# Patient Record
Sex: MALE | Race: ASIAN | NOT HISPANIC OR LATINO | URBAN - METROPOLITAN AREA
[De-identification: names, ages, dates, MRNs, and addresses within clinical notes are randomized per-mention and may not be internally consistent; named-entity substitution may affect disease eponyms.]

---

## 2019-04-10 ENCOUNTER — OUTPATIENT (OUTPATIENT)
Dept: OUTPATIENT SERVICES | Facility: HOSPITAL | Age: 74
LOS: 1 days | Discharge: HOME | End: 2019-04-10
Payer: MEDICAID

## 2019-04-10 PROBLEM — Z00.00 ENCOUNTER FOR PREVENTIVE HEALTH EXAMINATION: Status: ACTIVE | Noted: 2019-04-10

## 2019-04-12 ENCOUNTER — APPOINTMENT (OUTPATIENT)
Dept: RADIATION ONCOLOGY | Facility: CLINIC | Age: 74
End: 2019-04-12
Payer: MEDICAID

## 2019-04-12 VITALS
HEART RATE: 56 BPM | SYSTOLIC BLOOD PRESSURE: 145 MMHG | DIASTOLIC BLOOD PRESSURE: 71 MMHG | HEIGHT: 63 IN | BODY MASS INDEX: 30.02 KG/M2 | TEMPERATURE: 98.1 F | WEIGHT: 169.42 LBS | OXYGEN SATURATION: 98 % | RESPIRATION RATE: 16 BRPM

## 2019-04-12 DIAGNOSIS — I10 ESSENTIAL (PRIMARY) HYPERTENSION: ICD-10-CM

## 2019-04-12 DIAGNOSIS — E78.5 HYPERLIPIDEMIA, UNSPECIFIED: ICD-10-CM

## 2019-04-12 DIAGNOSIS — Z80.1 FAMILY HISTORY OF MALIGNANT NEOPLASM OF TRACHEA, BRONCHUS AND LUNG: ICD-10-CM

## 2019-04-12 DIAGNOSIS — E11.9 TYPE 2 DIABETES MELLITUS W/OUT COMPLICATIONS: ICD-10-CM

## 2019-04-12 DIAGNOSIS — B37.0 CANDIDAL STOMATITIS: ICD-10-CM

## 2019-04-12 PROCEDURE — 99204 OFFICE O/P NEW MOD 45 MIN: CPT | Mod: GC,25

## 2019-04-12 RX ORDER — RANITIDINE 75 MG/1
TABLET ORAL
Refills: 0 | Status: ACTIVE | COMMUNITY

## 2019-04-12 RX ORDER — FOLIC ACID 20 MG
CAPSULE ORAL
Refills: 0 | Status: ACTIVE | COMMUNITY

## 2019-04-12 RX ORDER — ZAFIRLUKAST 10 MG/1
TABLET, COATED ORAL
Refills: 0 | Status: ACTIVE | COMMUNITY

## 2019-04-12 RX ORDER — ALBUTEROL 90 MCG
AEROSOL (GRAM) INHALATION
Refills: 0 | Status: ACTIVE | COMMUNITY

## 2019-04-12 RX ORDER — FLUTICASONE PROPIONATE 0.5 MG/G
CREAM TOPICAL
Refills: 0 | Status: ACTIVE | COMMUNITY

## 2019-04-12 RX ORDER — LOSARTAN POTASSIUM 100 MG/1
TABLET, FILM COATED ORAL
Refills: 0 | Status: ACTIVE | COMMUNITY

## 2019-04-12 RX ORDER — SIMVASTATIN 80 MG/1
TABLET, FILM COATED ORAL
Refills: 0 | Status: ACTIVE | COMMUNITY

## 2019-04-12 RX ORDER — METHOTREXATE 2.5 MG/1
TABLET ORAL
Refills: 0 | Status: ACTIVE | COMMUNITY

## 2019-04-12 RX ORDER — TIOTROPIUM BROMIDE INHALATION SPRAY 1.56 UG/1
SPRAY, METERED RESPIRATORY (INHALATION)
Refills: 0 | Status: ACTIVE | COMMUNITY

## 2019-04-12 RX ORDER — BUDESONIDE AND FORMOTEROL FUMARATE DIHYDRATE 160; 4.5 UG/1; UG/1
AEROSOL RESPIRATORY (INHALATION)
Refills: 0 | Status: ACTIVE | COMMUNITY

## 2019-04-12 RX ORDER — METFORMIN HYDROCHLORIDE 625 MG/1
TABLET ORAL
Refills: 0 | Status: ACTIVE | COMMUNITY

## 2019-04-12 RX ORDER — HYDROXYCHLOROQUINE SULFATE 400 MG/1
TABLET ORAL
Refills: 0 | Status: ACTIVE | COMMUNITY

## 2019-04-12 RX ORDER — OLOPATADINE HYDROCHLORIDE 7 MG/ML
SOLUTION OPHTHALMIC
Refills: 0 | Status: ACTIVE | COMMUNITY

## 2019-04-12 RX ORDER — TAMSULOSIN HCL 0.4 MG
CAPSULE ORAL
Refills: 0 | Status: ACTIVE | COMMUNITY

## 2019-04-12 RX ORDER — NYSTATIN 100000 [USP'U]/ML
100000 SUSPENSION ORAL
Qty: 140 | Refills: 0 | Status: ACTIVE | COMMUNITY
Start: 2019-04-12 | End: 1900-01-01

## 2019-04-12 NOTE — REASON FOR VISIT
[Consideration for Non-Curative Therapy] : consideration for non-curative therapy for [Lung Cancer] : lung cancer [Family Member] : family member [Brain Metastasis] : brain metastasis

## 2019-04-12 NOTE — VITALS
[Maximal Pain Intensity: 5/10] : 5/10 [Least Pain Intensity: 2/10] : 2/10 [Pain Description/Quality: ___] : Pain description/quality: [unfilled] [Pain Duration: ___] : Pain duration: [unfilled] [Pain Location: ___] : Pain Location: [unfilled] [Pain Interferes with ADLs] : Pain interferes with activities of daily living. [Adjuvant (neuroleptics)] : adjuvant (neuroleptics) [70: Cares for self; unalbe to carry on normal activity or do active work.] : 70: Cares for self; unable to carry on normal activity or do active work. [ECOG Performance Status: 3 - Capable of only limited self care, confined to bed or chair more than 50% of waking hours] : Performance Status: 3 - Capable of only limited self care, confined to bed or chair more than 50% of waking hours

## 2019-04-14 NOTE — REVIEW OF SYSTEMS
[SOB on Exertion] : shortness of breath during exertion [Urinary Frequency] : urinary frequency [Joint Pain] : joint pain [Negative] : Allergic/Immunologic [FreeTextEntry6] : on meds for COPD [FreeTextEntry8] : on meds for BPH [FreeTextEntry9] : on meds for B/L knee pain [de-identified] : left side weakness

## 2019-04-14 NOTE — END OF VISIT
[] : Resident [Time Spent: ___ minutes] : I have spent [unfilled] minutes of face to face time with the patient [>50% of Time Spent on Counseling and Coordination of Care for  ___] : Greater than 50% of the encounter time was spent on counseling and coordination of care for [unfilled]

## 2019-04-14 NOTE — DISEASE MANAGEMENT
[Pathological] : TNM Stage: p [IVB] : IVB [FreeTextEntry4] : lung adenocarcinoma [MTNM] : 1c [TTNM] : 2 [NTNM] : 3

## 2019-04-14 NOTE — PHYSICAL EXAM
[Abdomen Tenderness] : non-tender [Normal] : normal spine exam without palpable tenderness, no kyphosis or scoliosis [de-identified] : distended [de-identified] : thrush, oral mucositis. poor dentition [de-identified] : bilateral knee pain [de-identified] : CN II-XII intact. LUE strength 4+/5, LLE strength 4/5. right upper and lower extremity intact. Sensation intact

## 2019-04-14 NOTE — HISTORY OF PRESENT ILLNESS
[FreeTextEntry1] : Mr. 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 Notable history includes DM on Metformin, Asthma, RA on methotrexate.\par \par He was brought to [facility] on 3/18/19 after he developed left upper and lower extremity weakness and difficulty walking for a few days. He quit smoking 5 months ago (15 pack year history). No witnessed seizures or LOC reported. Two weeks prior to admission he fell but did not hit his head or had LOC. He has a cough but denies SOB, fever, or chills. CT Head showed multiple regions of vasogenic edema. CT chest showed 3.9 cm right upper lobe mass with right hilar and mediastinal, and right supraclavicular lymphadenopathy. CT A/P was unremarkable.\par \par MRI brain showed diffuse metastatic cerebral lesions the largest in the right frontal parasagittal region measuring 1.4 x 1.6 cm with surrounding vasogenic edema. Additional small nodular enhancing lesions seen in the left medial temporal lobe, left posterior sylvian fissure, left frontal and left parietal cortex roughly 8-9 mm in size with mild edema. There is a 1.3 cm nodular enhancing lesion along the anterior aspect of the right glen.  No midline shift.He was evaluated by NSGY who recommended no surgical intervention given diffuse mets. He was discharged on Decadron 4mg BID taper and Keppra.\par \par The right supraclavicular node was biopsied on 3/25 at [facility] and showed lung adenocarcinoma, micropapillary type,PDL1 5%. Per patient there are additional testing are pending at [place].\par \par Patient does not have a medical oncologist and would like to get all of his care at [facility].\par \par His Decadron was tapered off yesterday. Per family he was on 2mg qD yesterday before stopping. He reports moderate improvement in left sided weakness since he started steroids. He can walk with a cane but reports mild dizziness. Continues to have a non productive cough but denies SOB or ARAUJO. Reports pain in his mouth and pain with swallowing.  He denies seizures, headache, vision changes, N/V. new weakness or numbness, or hemoptysis.  He has bilateral knee pain due to RA.

## 2019-04-15 ENCOUNTER — FORM ENCOUNTER (OUTPATIENT)
Age: 74
End: 2019-04-15

## 2019-04-16 ENCOUNTER — APPOINTMENT (OUTPATIENT)
Dept: ULTRASOUND IMAGING | Facility: IMAGING CENTER | Age: 74
End: 2019-04-16
Payer: MEDICAID

## 2019-04-16 ENCOUNTER — OUTPATIENT (OUTPATIENT)
Dept: OUTPATIENT SERVICES | Facility: HOSPITAL | Age: 74
LOS: 1 days | End: 2019-04-16
Payer: MEDICAID

## 2019-04-16 ENCOUNTER — APPOINTMENT (OUTPATIENT)
Dept: RADIATION ONCOLOGY | Facility: CLINIC | Age: 74
End: 2019-04-16
Payer: MEDICAID

## 2019-04-16 ENCOUNTER — OUTPATIENT (OUTPATIENT)
Dept: OUTPATIENT SERVICES | Facility: HOSPITAL | Age: 74
LOS: 1 days | Discharge: HOME | End: 2019-04-16

## 2019-04-16 VITALS
WEIGHT: 171.3 LBS | SYSTOLIC BLOOD PRESSURE: 144 MMHG | RESPIRATION RATE: 16 BRPM | OXYGEN SATURATION: 96 % | HEART RATE: 65 BPM | DIASTOLIC BLOOD PRESSURE: 81 MMHG | BODY MASS INDEX: 30.34 KG/M2

## 2019-04-16 DIAGNOSIS — Z80.6 FAMILY HISTORY OF LEUKEMIA: ICD-10-CM

## 2019-04-16 DIAGNOSIS — C34.90 MALIGNANT NEOPLASM OF UNSPECIFIED PART OF UNSPECIFIED BRONCHUS OR LUNG: ICD-10-CM

## 2019-04-16 DIAGNOSIS — Z87.891 PERSONAL HISTORY OF NICOTINE DEPENDENCE: ICD-10-CM

## 2019-04-16 PROCEDURE — 93970 EXTREMITY STUDY: CPT | Mod: 26

## 2019-04-16 PROCEDURE — 99215 OFFICE O/P EST HI 40 MIN: CPT | Mod: 25

## 2019-04-16 PROCEDURE — 93970 EXTREMITY STUDY: CPT

## 2019-04-17 ENCOUNTER — CHART COPY (OUTPATIENT)
Age: 74
End: 2019-04-17

## 2019-04-18 ENCOUNTER — APPOINTMENT (OUTPATIENT)
Dept: RADIATION ONCOLOGY | Facility: CLINIC | Age: 74
End: 2019-04-18
Payer: MEDICAID

## 2019-04-18 ENCOUNTER — OUTPATIENT (OUTPATIENT)
Dept: OUTPATIENT SERVICES | Facility: HOSPITAL | Age: 74
LOS: 1 days | Discharge: HOME | End: 2019-04-18
Payer: MEDICAID

## 2019-04-23 ENCOUNTER — FORM ENCOUNTER (OUTPATIENT)
Age: 74
End: 2019-04-23

## 2019-04-23 ENCOUNTER — APPOINTMENT (OUTPATIENT)
Age: 74
End: 2019-04-23
Payer: MEDICAID

## 2019-04-23 VITALS
RESPIRATION RATE: 28 BRPM | SYSTOLIC BLOOD PRESSURE: 136 MMHG | OXYGEN SATURATION: 98 % | HEART RATE: 59 BPM | WEIGHT: 165.35 LBS | DIASTOLIC BLOOD PRESSURE: 71 MMHG | TEMPERATURE: 98.2 F | BODY MASS INDEX: 29.29 KG/M2

## 2019-04-23 PROCEDURE — 99205 OFFICE O/P NEW HI 60 MIN: CPT

## 2019-04-24 ENCOUNTER — APPOINTMENT (OUTPATIENT)
Dept: NEUROSURGERY | Facility: CLINIC | Age: 74
End: 2019-04-24
Payer: MEDICAID

## 2019-04-24 ENCOUNTER — APPOINTMENT (OUTPATIENT)
Dept: MRI IMAGING | Facility: IMAGING CENTER | Age: 74
End: 2019-04-24

## 2019-04-24 ENCOUNTER — OUTPATIENT (OUTPATIENT)
Dept: OUTPATIENT SERVICES | Facility: HOSPITAL | Age: 74
LOS: 1 days | End: 2019-04-24
Payer: MEDICAID

## 2019-04-24 DIAGNOSIS — C79.31 SECONDARY MALIGNANT NEOPLASM OF BRAIN: ICD-10-CM

## 2019-04-24 PROCEDURE — 77334 RADIATION TREATMENT AID(S): CPT | Mod: 26

## 2019-04-24 PROCEDURE — 77290 THER RAD SIMULAJ FIELD CPLX: CPT | Mod: 26

## 2019-04-24 PROCEDURE — 99202 OFFICE O/P NEW SF 15 MIN: CPT

## 2019-04-24 PROCEDURE — 76498 UNLISTED MR PROCEDURE: CPT

## 2019-04-24 NOTE — REASON FOR VISIT
[Follow-Up: _____] : a [unfilled] follow-up visit [Family Member] : family member [FreeTextEntry1] : MD Rebolledo is a 73 year old male with past medical history of DM, asthma, HLD, rheumatoid arthritis (on methotrexate). He presented initially to to [facility] on 3/18/19 after having LUE and LLE weakness and trouble walking for a few days. Notably fell two weeks prior to admission.\par \par His imaging MRI last 4/24/19 showed several enhancing lesions consistent with diffuse cerebral metastatic lesions, the largest in the right posterior frontal/parietal parasagittal area with a moderate degree of surrounding vasogenic edema demonstrating central necrosis and peripheral enhancement, 1.4 X 1.6 cm. There was a 1.3 cm lesion along the anterior aspect of the right side of the glen. ~12 lesions total counted.\par \par Right neck mass biopsy on 3/25/19 revealed metastatic pulmonary carcinoma, micropapillary type. PDL1 5%. An ALK mutation was noted. \par \par

## 2019-04-24 NOTE — PHYSICAL EXAM
[FreeTextEntry1] : Daughter available for translation.\par Sitting comfortably in wheelchair. \par Able to ambulate w/ asisstance and cane. [General Appearance - Alert] : alert [General Appearance - In No Acute Distress] : in no acute distress [Person] : oriented to person [Place] : oriented to place [Time] : oriented to time [Motor Tone] : muscle tone was normal in all four extremities [FreeTextEntry8] : walks with assistance with cane [FreeTextEntry6] : RUE 4+5 RLE 4+5\par LUE 4+/5\par LLE 4/5

## 2019-04-24 NOTE — ASSESSMENT
[FreeTextEntry1] : 73 year old male with metastatic Lung CA to brain\par \par He is currently on decadron 1mg BID and keppra 500mg BID.\par \par He has met with both Dr. Estrada and Dr. De Jesus who recommended Gamma Knife therapy, masked based, treated in three fractions. Mask fitting was completed yesterday. GK therapy scheduled for tomorrow. \par

## 2019-04-25 ENCOUNTER — CHART COPY (OUTPATIENT)
Age: 74
End: 2019-04-25

## 2019-04-25 ENCOUNTER — APPOINTMENT (OUTPATIENT)
Dept: NEUROSURGERY | Facility: CLINIC | Age: 74
End: 2019-04-25

## 2019-04-25 PROCEDURE — 77290 THER RAD SIMULAJ FIELD CPLX: CPT | Mod: 26

## 2019-04-25 PROCEDURE — 77334 RADIATION TREATMENT AID(S): CPT | Mod: 26

## 2019-04-25 NOTE — ASSESSMENT
[FreeTextEntry1] : 73 m, heavy smoking history, RA on MTX, with metastatic pulmonary carcinoma, micropapillary type. PDL1 5%, KRAS mutated, with diffuse mets to brain, presenting to establish care.\par Will complete staging w/u and obtain pathology slides from [facility] for review at [facility]. \par Will refer to rheum for eval and treatment of RA. Unfortunately, he will not be a candidate for immunotherapy 2/2 active RA, which is a major treatment modality for metastatic NSCLC.  \par \par -CT c/a/p\par -Obtain slides from [facility]\par -c/w rad onc for GK\par -Rheumatology referral\par -Palliative care referral in the future\par -RTC 2-3 weeks after scans and slide review

## 2019-04-25 NOTE — HISTORY OF PRESENT ILLNESS
[de-identified] : Mr Rebolledo is a 73m with significant smoking history who presents for evaluation of advanced NSCLC. \par He presented to [facility] on 3/18/19 after having LUE and LLE weakness, A CT head showed multifocal regions of vasogenic/perilesional edema, concerning for metastatic disease. MRI done 3/18/19 showed several enhancing lesions consistent with diffuse cerebral metastatic lesions.\par A CT chest on 3/19/19 revealed 1 3.9 cm right upper lobe mass, in addition to right mediastinal lymphadenopathy.\par Right neck mass biopsy on 3/25/19 at [facility] revealed metastatic pulmonary carcinoma, micropapillary type. PDL1 5%, KRAS mutated.\par He was evaluated by radiation oncology Dr Estrada and By neurosurgery Dr Ferreira and will be undergoing GK. \par \par Mr Rebolledo spends most his time at home watching TV because of his left sided weakness. \par He lives with his daughter and has good social support.

## 2019-04-29 PROCEDURE — 77307 TELETHX ISODOSE PLAN CPLX: CPT | Mod: 26

## 2019-04-29 PROCEDURE — 77334 RADIATION TREATMENT AID(S): CPT | Mod: 26

## 2019-05-01 PROCEDURE — 77280 THER RAD SIMULAJ FIELD SMPL: CPT | Mod: 26

## 2019-05-07 NOTE — VITALS
[Maximal Pain Intensity: 6/10] : 6/10 [Least Pain Intensity: 0/10] : 0/10 [70: Cares for self; unalbe to carry on normal activity or do active work.] : 70: Cares for self; unable to carry on normal activity or do active work. [Date: ____________] : Patient's last distress assessment performed on [unfilled]. [0 - No Distress] : Distress Level: 0

## 2019-05-08 ENCOUNTER — FORM ENCOUNTER (OUTPATIENT)
Age: 74
End: 2019-05-08

## 2019-05-08 VITALS
RESPIRATION RATE: 30 BRPM | HEART RATE: 88 BPM | BODY MASS INDEX: 29.51 KG/M2 | SYSTOLIC BLOOD PRESSURE: 146 MMHG | WEIGHT: 166.56 LBS | TEMPERATURE: 98.96 F | DIASTOLIC BLOOD PRESSURE: 80 MMHG | OXYGEN SATURATION: 93 %

## 2019-05-08 PROCEDURE — 77427 RADIATION TX MANAGEMENT X5: CPT

## 2019-05-08 NOTE — REVIEW OF SYSTEMS
[Fatigue] : fatigue [SOB on Exertion] : shortness of breath during exertion [Dizziness] : dizziness [Difficulty Walking] : difficulty walking [Negative] : Eyes [Fever] : no fever [Night Sweats] : no night sweats [Chills] : no chills [Recent Change In Weight] : ~T no recent weight change [Vomiting] : no vomiting [Confused] : no confusion [FreeTextEntry7] : denies nausea [FreeTextEntry6] : SOB on exertion [de-identified] : Headache still 5-6/10 on left side. Feels the room is spinning.  [FreeTextEntry9] : bilateral knee pain. +1 edema bilateral LE

## 2019-05-08 NOTE — HISTORY OF PRESENT ILLNESS
[FreeTextEntry1] : \par ONCOLOGY HISTORY\par Mr. Rebolledo is a 73 year old Faroese speaking man with history of DM being treated with metformin, asthma, and RA for which he is on methotrexate. \par \par He presented to [facility] on 3/18/19 after having LUE and LLE weakness and trouble walking for a few days. \par Notably fell two weeks prior to admission.\par \par A CT head showed multifocal regions of vasogenic/perilesional edema, concerning for metastatic disease. MRI done 3/18/19 showed several enhancing lesions consistent with diffuse cerebral metastatic lesions, the largest in the right posterior frontal/parietal parasagittal area with a moderate degree of surrounding vasogenic edema demonstrating central necrosis and peripheral enhancement, 1.4 X 1.6 cm.  There was a 1.3 cm lesion along the anterior aspect of the right side of the glen. 4 lesions total counted.\par \par A CT chest on 3/19/19 revealed 1 3.9 cm right upper lobe mass, in addition to right mediastinal lymphadenopathy.\par There are additional smaller nodular enhancing lesions seen including the left medial temporal lobe, left posterior sylvian fissure, left frontal, and left parietal cortex, 8-9mm in size with mild degrees of surrounding edema. \par \par Right neck mass biopsy on 3/25/19 revealed metastatic pulmonary carcinoma, micropapillary type. PDL1 5%. An ALK mutation was noted. \par At the time of initial consult he noted left sided head burning and pain, 6/10 . Still with some left sided weakness, though this is improved since when he was in the hospital. he is on methotrexate for rheumatoid arthritis. On dexamethasone 1mg BID. Notes room spinning and dizziness which are constant.\par \par 5/8/19- Mr. Rebolledo presents today for OTV. He has completed 1500/3000 cgy to the whole brain. He was simulated for gamma knife but found to have too many lesions\par He is feeling well. Remains on dexamethasone 1mg BID. Still with constant dizziness. Notes bilateral LE weakness. Had an episode of blood in stool last week, aspirin was stopped, no further episodes. Has not take methotrexate since 4/26. Discussed with PMD yesterday, daughter will hold methotrexate until at least 5/22/19. \par Still with left sided headache 5-6/10.

## 2019-05-08 NOTE — PHYSICAL EXAM
[Respiration, Rhythm And Depth] : normal respiratory rhythm and effort [Auscultation Breath Sounds / Voice Sounds] : lungs were clear to auscultation bilaterally [Musculoskeletal - Swelling] : no joint swelling [No Focal Deficits] : no focal deficits [Oriented To Time, Place, And Person] : oriented to person, place, and time [Normal] : the sclera and conjunctiva were normal, pupils were equal in size, round, reactive to light and extraocular movements were intact. [de-identified] : frail appearing older male [de-identified] : 5+ strength bilateral upper and lower extremities. Patient ambulating with cane still [de-identified] : cranial nerves 2-12 grossly intact

## 2019-05-08 NOTE — DISEASE MANAGEMENT
[Pathological] : TNM Stage: p [IVB] : IVB [MTNM] : 1c [TTNM] : 2 [NTNM] : 3 [de-identified] : whole brain [de-identified] : 1500 cgy [de-identified] : 3000 cgy

## 2019-05-09 ENCOUNTER — OUTPATIENT (OUTPATIENT)
Dept: OUTPATIENT SERVICES | Facility: HOSPITAL | Age: 74
LOS: 1 days | End: 2019-05-09
Payer: MEDICAID

## 2019-05-09 ENCOUNTER — RESULT REVIEW (OUTPATIENT)
Age: 74
End: 2019-05-09

## 2019-05-09 ENCOUNTER — APPOINTMENT (OUTPATIENT)
Dept: CT IMAGING | Facility: IMAGING CENTER | Age: 74
End: 2019-05-09
Payer: MEDICAID

## 2019-05-09 DIAGNOSIS — C79.31 SECONDARY MALIGNANT NEOPLASM OF BRAIN: ICD-10-CM

## 2019-05-09 DIAGNOSIS — C34.90 MALIGNANT NEOPLASM OF UNSPECIFIED PART OF UNSPECIFIED BRONCHUS OR LUNG: ICD-10-CM

## 2019-05-09 PROCEDURE — 74177 CT ABD & PELVIS W/CONTRAST: CPT

## 2019-05-09 PROCEDURE — 71260 CT THORAX DX C+: CPT | Mod: 26

## 2019-05-09 PROCEDURE — 74177 CT ABD & PELVIS W/CONTRAST: CPT | Mod: 26

## 2019-05-09 PROCEDURE — 71260 CT THORAX DX C+: CPT

## 2019-05-13 ENCOUNTER — APPOINTMENT (OUTPATIENT)
Dept: RHEUMATOLOGY | Facility: CLINIC | Age: 74
End: 2019-05-13

## 2019-05-15 ENCOUNTER — INPATIENT (INPATIENT)
Facility: HOSPITAL | Age: 74
LOS: 6 days | Discharge: HOME HEALTH | End: 2019-05-22
Attending: HOSPITALIST | Admitting: HOSPITALIST
Payer: MEDICAID

## 2019-05-15 ENCOUNTER — APPOINTMENT (OUTPATIENT)
Dept: HEMATOLOGY ONCOLOGY | Facility: CLINIC | Age: 74
End: 2019-05-15
Payer: MEDICAID

## 2019-05-15 VITALS
RESPIRATION RATE: 16 BRPM | SYSTOLIC BLOOD PRESSURE: 137 MMHG | DIASTOLIC BLOOD PRESSURE: 71 MMHG | OXYGEN SATURATION: 98 % | HEART RATE: 75 BPM | TEMPERATURE: 98 F

## 2019-05-15 VITALS
SYSTOLIC BLOOD PRESSURE: 120 MMHG | TEMPERATURE: 97.3 F | HEART RATE: 65 BPM | DIASTOLIC BLOOD PRESSURE: 74 MMHG | RESPIRATION RATE: 14 BRPM | OXYGEN SATURATION: 93 %

## 2019-05-15 LAB
ALBUMIN SERPL ELPH-MCNC: 3.7 G/DL — SIGNIFICANT CHANGE UP (ref 3.3–5)
ALP SERPL-CCNC: 71 U/L — SIGNIFICANT CHANGE UP (ref 40–120)
ALT FLD-CCNC: 23 U/L — SIGNIFICANT CHANGE UP (ref 4–41)
ANION GAP SERPL CALC-SCNC: 12 MMO/L — SIGNIFICANT CHANGE UP (ref 7–14)
ANISOCYTOSIS BLD QL: SLIGHT — SIGNIFICANT CHANGE UP
APPEARANCE UR: CLEAR — SIGNIFICANT CHANGE UP
APTT BLD: 30.2 SEC — SIGNIFICANT CHANGE UP (ref 27.5–36.3)
AST SERPL-CCNC: 12 U/L — SIGNIFICANT CHANGE UP (ref 4–40)
BACTERIA # UR AUTO: NEGATIVE — SIGNIFICANT CHANGE UP
BASOPHILS # BLD AUTO: 0.05 K/UL — SIGNIFICANT CHANGE UP (ref 0–0.2)
BASOPHILS NFR BLD AUTO: 0.8 % — SIGNIFICANT CHANGE UP (ref 0–2)
BASOPHILS NFR SPEC: 0.9 % — SIGNIFICANT CHANGE UP (ref 0–2)
BILIRUB SERPL-MCNC: 0.5 MG/DL — SIGNIFICANT CHANGE UP (ref 0.2–1.2)
BILIRUB UR-MCNC: NEGATIVE — SIGNIFICANT CHANGE UP
BLASTS # FLD: 0 % — SIGNIFICANT CHANGE UP (ref 0–0)
BLOOD UR QL VISUAL: SIGNIFICANT CHANGE UP
BUN SERPL-MCNC: 24 MG/DL — HIGH (ref 7–23)
CALCIUM SERPL-MCNC: 9.5 MG/DL — SIGNIFICANT CHANGE UP (ref 8.4–10.5)
CHLORIDE SERPL-SCNC: 101 MMOL/L — SIGNIFICANT CHANGE UP (ref 98–107)
CO2 SERPL-SCNC: 25 MMOL/L — SIGNIFICANT CHANGE UP (ref 22–31)
COLOR SPEC: YELLOW — SIGNIFICANT CHANGE UP
CREAT SERPL-MCNC: 0.91 MG/DL — SIGNIFICANT CHANGE UP (ref 0.5–1.3)
EOSINOPHIL # BLD AUTO: 0.09 K/UL — SIGNIFICANT CHANGE UP (ref 0–0.5)
EOSINOPHIL NFR BLD AUTO: 1.5 % — SIGNIFICANT CHANGE UP (ref 0–6)
EOSINOPHIL NFR FLD: 0.9 % — SIGNIFICANT CHANGE UP (ref 0–6)
GIANT PLATELETS BLD QL SMEAR: PRESENT — SIGNIFICANT CHANGE UP
GLUCOSE SERPL-MCNC: 244 MG/DL — HIGH (ref 70–99)
GLUCOSE UR-MCNC: 300 — HIGH
HCT VFR BLD CALC: 46.7 % — SIGNIFICANT CHANGE UP (ref 39–50)
HGB BLD-MCNC: 14.6 G/DL — SIGNIFICANT CHANGE UP (ref 13–17)
HYALINE CASTS # UR AUTO: NEGATIVE — SIGNIFICANT CHANGE UP
IMM GRANULOCYTES NFR BLD AUTO: 5.3 % — HIGH (ref 0–1.5)
INR BLD: 1.08 — SIGNIFICANT CHANGE UP (ref 0.88–1.17)
KETONES UR-MCNC: NEGATIVE — SIGNIFICANT CHANGE UP
LEUKOCYTE ESTERASE UR-ACNC: SIGNIFICANT CHANGE UP
LYMPHOCYTES # BLD AUTO: 1.22 K/UL — SIGNIFICANT CHANGE UP (ref 1–3.3)
LYMPHOCYTES # BLD AUTO: 20.2 % — SIGNIFICANT CHANGE UP (ref 13–44)
LYMPHOCYTES NFR SPEC AUTO: 20.6 % — SIGNIFICANT CHANGE UP (ref 13–44)
MCHC RBC-ENTMCNC: 26.8 PG — LOW (ref 27–34)
MCHC RBC-ENTMCNC: 31.3 % — LOW (ref 32–36)
MCV RBC AUTO: 85.7 FL — SIGNIFICANT CHANGE UP (ref 80–100)
METAMYELOCYTES # FLD: 0 % — SIGNIFICANT CHANGE UP (ref 0–1)
MICROCYTES BLD QL: SLIGHT — SIGNIFICANT CHANGE UP
MONOCYTES # BLD AUTO: 0.37 K/UL — SIGNIFICANT CHANGE UP (ref 0–0.9)
MONOCYTES NFR BLD AUTO: 6.1 % — SIGNIFICANT CHANGE UP (ref 2–14)
MONOCYTES NFR BLD: 8.4 % — SIGNIFICANT CHANGE UP (ref 2–9)
MYELOCYTES NFR BLD: 0 % — SIGNIFICANT CHANGE UP (ref 0–0)
NEUTROPHIL AB SER-ACNC: 63.6 % — SIGNIFICANT CHANGE UP (ref 43–77)
NEUTROPHILS # BLD AUTO: 3.98 K/UL — SIGNIFICANT CHANGE UP (ref 1.8–7.4)
NEUTROPHILS NFR BLD AUTO: 66.1 % — SIGNIFICANT CHANGE UP (ref 43–77)
NEUTS BAND # BLD: 2.8 % — SIGNIFICANT CHANGE UP (ref 0–6)
NITRITE UR-MCNC: NEGATIVE — SIGNIFICANT CHANGE UP
NRBC # FLD: 0.02 K/UL — SIGNIFICANT CHANGE UP (ref 0–0)
OTHER - HEMATOLOGY %: 0 — SIGNIFICANT CHANGE UP
OVALOCYTES BLD QL SMEAR: SLIGHT — SIGNIFICANT CHANGE UP
PH UR: 6 — SIGNIFICANT CHANGE UP (ref 5–8)
PLATELET # BLD AUTO: 65 K/UL — LOW (ref 150–400)
PLATELET COUNT - ESTIMATE: SIGNIFICANT CHANGE UP
PMV BLD: 12.5 FL — SIGNIFICANT CHANGE UP (ref 7–13)
POIKILOCYTOSIS BLD QL AUTO: SLIGHT — SIGNIFICANT CHANGE UP
POTASSIUM SERPL-MCNC: 3.8 MMOL/L — SIGNIFICANT CHANGE UP (ref 3.5–5.3)
POTASSIUM SERPL-SCNC: 3.8 MMOL/L — SIGNIFICANT CHANGE UP (ref 3.5–5.3)
PROMYELOCYTES # FLD: 0 % — SIGNIFICANT CHANGE UP (ref 0–0)
PROT SERPL-MCNC: 6.9 G/DL — SIGNIFICANT CHANGE UP (ref 6–8.3)
PROT UR-MCNC: 30 — SIGNIFICANT CHANGE UP
PROTHROM AB SERPL-ACNC: 12.4 SEC — SIGNIFICANT CHANGE UP (ref 9.8–13.1)
RBC # BLD: 5.45 M/UL — SIGNIFICANT CHANGE UP (ref 4.2–5.8)
RBC # FLD: 15.2 % — HIGH (ref 10.3–14.5)
RBC CASTS # UR COMP ASSIST: SIGNIFICANT CHANGE UP (ref 0–?)
SMUDGE CELLS # BLD: PRESENT — SIGNIFICANT CHANGE UP
SODIUM SERPL-SCNC: 138 MMOL/L — SIGNIFICANT CHANGE UP (ref 135–145)
SP GR SPEC: 1.02 — SIGNIFICANT CHANGE UP (ref 1–1.04)
SQUAMOUS # UR AUTO: SIGNIFICANT CHANGE UP
UROBILINOGEN FLD QL: NORMAL — SIGNIFICANT CHANGE UP
VARIANT LYMPHS # BLD: 2.8 % — SIGNIFICANT CHANGE UP
WBC # BLD: 6.03 K/UL — SIGNIFICANT CHANGE UP (ref 3.8–10.5)
WBC # FLD AUTO: 6.03 K/UL — SIGNIFICANT CHANGE UP (ref 3.8–10.5)
WBC UR QL: HIGH (ref 0–?)

## 2019-05-15 PROCEDURE — 99215 OFFICE O/P EST HI 40 MIN: CPT

## 2019-05-15 PROCEDURE — 72157 MRI CHEST SPINE W/O & W/DYE: CPT | Mod: 26

## 2019-05-15 PROCEDURE — 72158 MRI LUMBAR SPINE W/O & W/DYE: CPT | Mod: 26

## 2019-05-15 PROCEDURE — 70450 CT HEAD/BRAIN W/O DYE: CPT | Mod: 26

## 2019-05-15 RX ORDER — OXYCODONE AND ACETAMINOPHEN 5; 325 MG/1; MG/1
1 TABLET ORAL ONCE
Refills: 0 | Status: DISCONTINUED | OUTPATIENT
Start: 2019-05-15 | End: 2019-05-15

## 2019-05-15 RX ORDER — SODIUM CHLORIDE 9 MG/ML
1000 INJECTION, SOLUTION INTRAVENOUS ONCE
Refills: 0 | Status: COMPLETED | OUTPATIENT
Start: 2019-05-15 | End: 2019-05-15

## 2019-05-15 RX ADMIN — OXYCODONE AND ACETAMINOPHEN 1 TABLET(S): 5; 325 TABLET ORAL at 18:05

## 2019-05-15 RX ADMIN — SODIUM CHLORIDE 1000 MILLILITER(S): 9 INJECTION, SOLUTION INTRAVENOUS at 18:05

## 2019-05-15 NOTE — ED PROVIDER NOTE - NS ED ROS FT
CONSTITUTIONAL: No fevers, no chills, no lightheadedness, no dizziness  Eyes: no visual changes  Ears: no ear drainage, no ear pain  Nose: no nasal congestion  Mouth/Throat: no sore throat  CV: No chest pain, no palpitations  PULM: No SOB, no cough  GI: No n/v/d, no abd pain  : no dysuria, no hematuria  SKIN: no rashes.  NEURO: no headache  PSYCHIATRIC: no known mental health issues.

## 2019-05-15 NOTE — ED PROVIDER NOTE - ATTENDING CONTRIBUTION TO CARE
73M p/w back pain, lower x 4 days, a/w  bilat leg pain, LLE weakness and numbness.  Has lung CA with mets to brain.  sent in from [facility] for emergent MRI r/o mets to spinal cord.  Pt appears dry also but no dysuria, does have objective LLE weakness.  Rx pain meds, fluids, check labs, and urine, MR L-spine eval for mets.  Likely admission.  VS:  unremarkable    GEN - mild distress back pain, malaise; A+O x3   HEAD - NC/AT     ENT - PEERL, EOMI, mucous membranes dry, no discharge      NECK: Neck supple, non-tender without lymphadenopathy, no masses, no JVD  PULM - CTA b/l,  symmetric breath sounds  COR -  normal heart sounds    ABD - , ND, NT, soft,  BACK - no CVA tenderness, mild ttp lumbar spine     EXTREMS - no edema, no deformity, warm and well perfused    SKIN - no rash or bruising      NEUROLOGIC - alert, CN 2-12 intact, LLE 4/5 strength, decr sensation LLE.

## 2019-05-15 NOTE — ED PROVIDER NOTE - OBJECTIVE STATEMENT
72yo M hx dm, lung CA mets to brain currently on rad therapy here with low back pain, b/l LE weakness since last night. Went to [facility] to f/u for rads appt but was sent to ED for concern for cord compression. No abd pain, urinary sx, saddle anesthesia, urinary/bowel incontinence, night sweats or fever.

## 2019-05-15 NOTE — ED ADULT NURSE REASSESSMENT NOTE - NS ED NURSE REASSESS COMMENT FT1
Pt. returned from MRI. Pt. is primarily German speaking and requests family at bedside to translate. Pt. received A&Ox3, ambulatory with cane at baseline, on 2.5 L O2 via nasal cannula, with 20 gauge IV in left hand. No acute distress at present. Pt. is resting comfortably. Side rails up, call bell within reach. Family at bedside. Awaiting MRI results. Will continue to monitor.

## 2019-05-15 NOTE — ED ADULT NURSE NOTE - NSIMPLEMENTINTERV_GEN_ALL_ED
Implemented All Fall Risk Interventions:  [place] to call system. Call bell, personal items and telephone within reach. Instruct patient to call for assistance. Room bathroom lighting operational. Non-slip footwear when patient is off stretcher. Physically safe environment: no spills, clutter or unnecessary equipment. Stretcher in lowest position, wheels locked, appropriate side rails in place. Provide visual cue, wrist band, yellow gown, etc. Monitor gait and stability. Monitor for mental status changes and reorient to person, place, and time. Review medications for side effects contributing to fall risk. Reinforce activity limits and safety measures with patient and family.

## 2019-05-15 NOTE — ED ADULT NURSE NOTE - OBJECTIVE STATEMENT
Pt arrived to room 27 c/o weakness x 5 days. Hx: HTN, Asthma, HLD, DM, Lung CA, arthritis. Pt also c/o SOB, productive coughing, denies fever as per daughter. Denies CP. Pt evaluated by MD. 20g IV observed in place to L hand. VSS at this time. Respirations even/unlabored at this time. Pt is normally ambulatory with cane but since last night has been increasingly weak and unable to walk. Pt also c/o B/L knee pain and lower back pain. Denies recent falls/injuries.  Awake/alert, aox3.     Pt is [place] speaking, consent given by pt for daughter to translate at bedside. Tranlator ID [id number] Amna.

## 2019-05-15 NOTE — ED CLERICAL - CLERICAL COMMENTS
pt with lung ca with mets to brain c/o bilat LE weakness at 04:33. IR CHEST TUBE INSERTION    (Results Pending)   XR CHEST PORTABLE    (Results Pending)           Assessment/Plan:    Active Hospital Problems    Diagnosis Date Noted    Primary spontaneous pneumothorax [J93.11]     Hypotension [I95.9]     Spontaneous tension pneumothorax [J93.0] 10/29/2018    COPD exacerbation (HCC) [J44.1]      Acute hypoxic RF, unclear etiology, possible the Tension pneumothorax, s/p Rt sided chest tube placement  By on call hospitalist and revision and placement of another c-tube by pulm  - was on mechanical vent, Management as per pulmonology  - CT chest showed consolidation and left pleural airspace dis, was placed on zosyn, the pt spiked fever  and vanc was added, would recommend blood cultures  -now off all abx, zosyn stopped today. - s/p bronchoscope tow times, resp culture was sent and the ET tube was changed due to mucous plugging  - blood culture is nGTD  - pulm is following  - extubated  but remains on precedex  -off precedex  Might swithc out chest tubes   He has been to water seel, but when he goes to this pneumo gets worse. Plan to remove bottom chest tube and possibly get a pigtail in the apical pneumo    Altered mental status  Appears he was retaining CO2  He is improved now.    PCO2 is better.     Chronic CHF with ? acute exacerbation, the pt has EF of 30 % with dilated Rt ventricle and flattening of the septum, hypokinesis of the left ventricle, ECHO report states that there is fluid overload, cardiology is following.  - on lasix, 40 mg OD, bid at home , he has -8.6L, will continue to watch kideny function      HTN, holding his blood pressure medications due to above.     HLD on statin will continue     Hx Sarcoidosis probably the reason why he has the CHF, possible flare, continue on current dose of prednisone      Nutrition via tube feed, dietitian to help manage     Speech eval and treat        DVT Prophylaxis: lovneox  Diet: DIET DYSPHAGIA III ADVANCED; No Drinking Straw  Dietary Nutrition Supplements: Low Calorie High Protein Supplement  Code Status: Full Code    PT/OT Eval Status: not at this time     Dispo - inpatient     Malou Savage MD

## 2019-05-15 NOTE — ED ADULT NURSE NOTE - CHIEF COMPLAINT QUOTE
Pt brought in by EMS from [facility] for weakness and L knee pain and back pain since last night. Pt has a hx of Brain CA, last radiation was yesterday.

## 2019-05-15 NOTE — ED ADULT NURSE NOTE - ED STAT RN HANDOFF DETAILS
No acute distress at present. Respirations are even and unlabored on room air. Pt. is admitted to Medicine, awaiting bed assignment. Report given to ESSU 2 MAINOR Reyes. Pt. transported to ESSU 2.

## 2019-05-15 NOTE — ED PROVIDER NOTE - PHYSICAL EXAMINATION
Gen: Well appearing, NAD  Head: NCAT  HEENT: PERRL, MMM, normal conjunctiva, anicteric, neck supple  Lung: CTAB, no adventitious sounds  CV: RRR, no murmurs  Abd: soft, NTND, no rebound or guarding, no CVAT  MSK: No edema, no visible deformities  Neuro: CN II-XII intact. 4/5  Skin: Warm and dry, no evidence of rash  Psych: normal mood and affect Gen: Well appearing, NAD  Head: NCAT  HEENT: PERRL, MMM, normal conjunctiva, anicteric, neck supple  Lung: CTAB, no adventitious sounds  CV: RRR, no murmurs  Abd: soft, NTND, no rebound or guarding, no CVAT  MSK: No edema, no visible deformities  Neuro: CN II-XII intact. 4/5 LLE 5/5 RLE strength, babinsky negative, straight leg test negative, decreased sensation throughout LLE  Skin: Warm and dry, no evidence of rash  Psych: normal mood and affect  MCKENZIE: Good rectal tone

## 2019-05-15 NOTE — ED PROVIDER NOTE - PROGRESS NOTE DETAILS
Annie Zurita M.D. Resident  patient signed out to me by Dr. Duran, pending MR and CTHead. If both negative will admit for neuro workup for objective weakness and decreased sensation in LLE. Stanislav: Received s/o. d/w radiology resident, awaiting imaging reads, reassessment. Annie Zurita M.D. Resident  Paged Hospitalist. Klepfish: MR prelim no cord compression. will give abx for possible UTI. will admit for inablity to ambulate, further neuro w/u. resident d/w hospitalist and text paged mar. Annie Zurita M.D. Resident  Paged NS per hospitalist request. clinical question: recommendation on steroids?

## 2019-05-15 NOTE — ED ADULT TRIAGE NOTE - CHIEF COMPLAINT QUOTE
Pt brought in by EMS from [facility] for weakness and L knee pain and back pain since last night. Pt has a hx of Brain CA, last radiation was yesterday.

## 2019-05-16 DIAGNOSIS — Z90.49 ACQUIRED ABSENCE OF OTHER SPECIFIED PARTS OF DIGESTIVE TRACT: Chronic | ICD-10-CM

## 2019-05-16 DIAGNOSIS — M54.9 DORSALGIA, UNSPECIFIED: ICD-10-CM

## 2019-05-16 DIAGNOSIS — E78.5 HYPERLIPIDEMIA, UNSPECIFIED: ICD-10-CM

## 2019-05-16 DIAGNOSIS — R53.1 WEAKNESS: ICD-10-CM

## 2019-05-16 DIAGNOSIS — C34.90 MALIGNANT NEOPLASM OF UNSPECIFIED PART OF UNSPECIFIED BRONCHUS OR LUNG: ICD-10-CM

## 2019-05-16 DIAGNOSIS — R29.898 OTHER SYMPTOMS AND SIGNS INVOLVING THE MUSCULOSKELETAL SYSTEM: ICD-10-CM

## 2019-05-16 DIAGNOSIS — E11.9 TYPE 2 DIABETES MELLITUS WITHOUT COMPLICATIONS: ICD-10-CM

## 2019-05-16 DIAGNOSIS — M06.9 RHEUMATOID ARTHRITIS, UNSPECIFIED: ICD-10-CM

## 2019-05-16 DIAGNOSIS — D69.6 THROMBOCYTOPENIA, UNSPECIFIED: ICD-10-CM

## 2019-05-16 DIAGNOSIS — B37.0 CANDIDAL STOMATITIS: ICD-10-CM

## 2019-05-16 DIAGNOSIS — Z29.9 ENCOUNTER FOR PROPHYLACTIC MEASURES, UNSPECIFIED: ICD-10-CM

## 2019-05-16 DIAGNOSIS — I10 ESSENTIAL (PRIMARY) HYPERTENSION: ICD-10-CM

## 2019-05-16 LAB
HCT VFR BLD CALC: 44.7 % — SIGNIFICANT CHANGE UP (ref 39–50)
HGB BLD-MCNC: 14.6 G/DL — SIGNIFICANT CHANGE UP (ref 13–17)
MCHC RBC-ENTMCNC: 27.8 PG — SIGNIFICANT CHANGE UP (ref 27–34)
MCHC RBC-ENTMCNC: 32.7 % — SIGNIFICANT CHANGE UP (ref 32–36)
MCV RBC AUTO: 85 FL — SIGNIFICANT CHANGE UP (ref 80–100)
NRBC # FLD: 0 K/UL — SIGNIFICANT CHANGE UP (ref 0–0)
PLATELET # BLD AUTO: 48 K/UL — LOW (ref 150–400)
PMV BLD: 11.6 FL — SIGNIFICANT CHANGE UP (ref 7–13)
RBC # BLD: 5.26 M/UL — SIGNIFICANT CHANGE UP (ref 4.2–5.8)
RBC # FLD: 14.9 % — HIGH (ref 10.3–14.5)
WBC # BLD: 5.72 K/UL — SIGNIFICANT CHANGE UP (ref 3.8–10.5)
WBC # FLD AUTO: 5.72 K/UL — SIGNIFICANT CHANGE UP (ref 3.8–10.5)

## 2019-05-16 PROCEDURE — 70553 MRI BRAIN STEM W/O & W/DYE: CPT | Mod: 26

## 2019-05-16 PROCEDURE — 99254 IP/OBS CNSLTJ NEW/EST MOD 60: CPT | Mod: GC

## 2019-05-16 PROCEDURE — 99223 1ST HOSP IP/OBS HIGH 75: CPT

## 2019-05-16 PROCEDURE — 99222 1ST HOSP IP/OBS MODERATE 55: CPT

## 2019-05-16 RX ORDER — RANITIDINE HYDROCHLORIDE 150 MG/1
1 TABLET, FILM COATED ORAL
Qty: 0 | Refills: 0 | DISCHARGE

## 2019-05-16 RX ORDER — DEXAMETHASONE 0.5 MG/5ML
4 ELIXIR ORAL EVERY 8 HOURS
Refills: 0 | Status: DISCONTINUED | OUTPATIENT
Start: 2019-05-16 | End: 2019-05-16

## 2019-05-16 RX ORDER — TIOTROPIUM BROMIDE 18 UG/1
1 CAPSULE ORAL; RESPIRATORY (INHALATION) DAILY
Refills: 0 | Status: DISCONTINUED | OUTPATIENT
Start: 2019-05-16 | End: 2019-05-22

## 2019-05-16 RX ORDER — HYDROXYCHLOROQUINE SULFATE 200 MG
200 TABLET ORAL
Refills: 0 | Status: DISCONTINUED | OUTPATIENT
Start: 2019-05-16 | End: 2019-05-22

## 2019-05-16 RX ORDER — INSULIN GLARGINE 100 [IU]/ML
10 INJECTION, SOLUTION SUBCUTANEOUS
Refills: 0 | Status: DISCONTINUED | OUTPATIENT
Start: 2019-05-16 | End: 2019-05-18

## 2019-05-16 RX ORDER — DEXTROSE 50 % IN WATER 50 %
25 SYRINGE (ML) INTRAVENOUS ONCE
Refills: 0 | Status: DISCONTINUED | OUTPATIENT
Start: 2019-05-16 | End: 2019-05-22

## 2019-05-16 RX ORDER — INSULIN LISPRO 100/ML
VIAL (ML) SUBCUTANEOUS
Refills: 0 | Status: DISCONTINUED | OUTPATIENT
Start: 2019-05-16 | End: 2019-05-18

## 2019-05-16 RX ORDER — ASPIRIN/CALCIUM CARB/MAGNESIUM 324 MG
1 TABLET ORAL
Qty: 0 | Refills: 0 | DISCHARGE

## 2019-05-16 RX ORDER — CAPSAICIN 0.025 %
1 CREAM (GRAM) TOPICAL
Qty: 0 | Refills: 0 | DISCHARGE

## 2019-05-16 RX ORDER — INSULIN LISPRO 100/ML
VIAL (ML) SUBCUTANEOUS AT BEDTIME
Refills: 0 | Status: DISCONTINUED | OUTPATIENT
Start: 2019-05-16 | End: 2019-05-22

## 2019-05-16 RX ORDER — ALBUTEROL 90 UG/1
2 AEROSOL, METERED ORAL
Qty: 0 | Refills: 0 | DISCHARGE

## 2019-05-16 RX ORDER — LOSARTAN POTASSIUM 100 MG/1
25 TABLET, FILM COATED ORAL DAILY
Refills: 0 | Status: DISCONTINUED | OUTPATIENT
Start: 2019-05-16 | End: 2019-05-22

## 2019-05-16 RX ORDER — DEXAMETHASONE 0.5 MG/5ML
10 ELIXIR ORAL ONCE
Refills: 0 | Status: COMPLETED | OUTPATIENT
Start: 2019-05-16 | End: 2019-05-16

## 2019-05-16 RX ORDER — NYSTATIN 500MM UNIT
500000 POWDER (EA) MISCELLANEOUS
Refills: 0 | Status: DISCONTINUED | OUTPATIENT
Start: 2019-05-16 | End: 2019-05-22

## 2019-05-16 RX ORDER — GLUCAGON INJECTION, SOLUTION 0.5 MG/.1ML
1 INJECTION, SOLUTION SUBCUTANEOUS ONCE
Refills: 0 | Status: DISCONTINUED | OUTPATIENT
Start: 2019-05-16 | End: 2019-05-22

## 2019-05-16 RX ORDER — TAMSULOSIN HYDROCHLORIDE 0.4 MG/1
0.4 CAPSULE ORAL AT BEDTIME
Refills: 0 | Status: DISCONTINUED | OUTPATIENT
Start: 2019-05-16 | End: 2019-05-22

## 2019-05-16 RX ORDER — TAMSULOSIN HYDROCHLORIDE 0.4 MG/1
1 CAPSULE ORAL
Qty: 0 | Refills: 0 | DISCHARGE

## 2019-05-16 RX ORDER — GABAPENTIN 400 MG/1
2 CAPSULE ORAL
Qty: 0 | Refills: 0 | DISCHARGE

## 2019-05-16 RX ORDER — LEVETIRACETAM 250 MG/1
1 TABLET, FILM COATED ORAL
Qty: 0 | Refills: 0 | DISCHARGE

## 2019-05-16 RX ORDER — DEXAMETHASONE 0.5 MG/5ML
4 ELIXIR ORAL EVERY 8 HOURS
Refills: 0 | Status: DISCONTINUED | OUTPATIENT
Start: 2019-05-16 | End: 2019-05-20

## 2019-05-16 RX ORDER — CROMOLYN SODIUM 4 %
1 DROPS OPHTHALMIC (EYE)
Qty: 0 | Refills: 0 | DISCHARGE

## 2019-05-16 RX ORDER — BUDESONIDE AND FORMOTEROL FUMARATE DIHYDRATE 160; 4.5 UG/1; UG/1
2 AEROSOL RESPIRATORY (INHALATION)
Qty: 0 | Refills: 0 | DISCHARGE

## 2019-05-16 RX ORDER — DEXTROSE 50 % IN WATER 50 %
12.5 SYRINGE (ML) INTRAVENOUS ONCE
Refills: 0 | Status: DISCONTINUED | OUTPATIENT
Start: 2019-05-16 | End: 2019-05-22

## 2019-05-16 RX ORDER — FOLIC ACID 0.8 MG
1 TABLET ORAL
Qty: 0 | Refills: 0 | DISCHARGE

## 2019-05-16 RX ORDER — HYDROXYCHLOROQUINE SULFATE 200 MG
1 TABLET ORAL
Qty: 0 | Refills: 0 | DISCHARGE

## 2019-05-16 RX ORDER — GABAPENTIN 400 MG/1
200 CAPSULE ORAL AT BEDTIME
Refills: 0 | Status: DISCONTINUED | OUTPATIENT
Start: 2019-05-16 | End: 2019-05-22

## 2019-05-16 RX ORDER — ASPIRIN/CALCIUM CARB/MAGNESIUM 324 MG
81 TABLET ORAL DAILY
Refills: 0 | Status: DISCONTINUED | OUTPATIENT
Start: 2019-05-16 | End: 2019-05-22

## 2019-05-16 RX ORDER — CEFTRIAXONE 500 MG/1
1 INJECTION, POWDER, FOR SOLUTION INTRAMUSCULAR; INTRAVENOUS ONCE
Refills: 0 | Status: COMPLETED | OUTPATIENT
Start: 2019-05-16 | End: 2019-05-16

## 2019-05-16 RX ORDER — DEXTROSE 50 % IN WATER 50 %
15 SYRINGE (ML) INTRAVENOUS ONCE
Refills: 0 | Status: DISCONTINUED | OUTPATIENT
Start: 2019-05-16 | End: 2019-05-22

## 2019-05-16 RX ORDER — LOSARTAN POTASSIUM 100 MG/1
1 TABLET, FILM COATED ORAL
Qty: 0 | Refills: 0 | DISCHARGE

## 2019-05-16 RX ORDER — FLUTICASONE PROPIONATE 50 MCG
1 SPRAY, SUSPENSION NASAL
Qty: 0 | Refills: 0 | DISCHARGE

## 2019-05-16 RX ORDER — LEVETIRACETAM 250 MG/1
500 TABLET, FILM COATED ORAL
Refills: 0 | Status: DISCONTINUED | OUTPATIENT
Start: 2019-05-16 | End: 2019-05-22

## 2019-05-16 RX ORDER — SIMVASTATIN 20 MG/1
1 TABLET, FILM COATED ORAL
Qty: 0 | Refills: 0 | DISCHARGE

## 2019-05-16 RX ORDER — ENOXAPARIN SODIUM 100 MG/ML
40 INJECTION SUBCUTANEOUS DAILY
Refills: 0 | Status: DISCONTINUED | OUTPATIENT
Start: 2019-05-16 | End: 2019-05-16

## 2019-05-16 RX ORDER — SODIUM CHLORIDE 9 MG/ML
1000 INJECTION, SOLUTION INTRAVENOUS
Refills: 0 | Status: DISCONTINUED | OUTPATIENT
Start: 2019-05-16 | End: 2019-05-22

## 2019-05-16 RX ORDER — TIOTROPIUM BROMIDE 18 UG/1
1 CAPSULE ORAL; RESPIRATORY (INHALATION)
Qty: 0 | Refills: 0 | DISCHARGE

## 2019-05-16 RX ORDER — ZAFIRLUKAST 10 MG/1
1 TABLET, COATED ORAL
Qty: 0 | Refills: 0 | DISCHARGE

## 2019-05-16 RX ORDER — ALBUTEROL 90 UG/1
2 AEROSOL, METERED ORAL EVERY 6 HOURS
Refills: 0 | Status: DISCONTINUED | OUTPATIENT
Start: 2019-05-16 | End: 2019-05-22

## 2019-05-16 RX ORDER — METHOTREXATE 2.5 MG/1
6 TABLET ORAL
Qty: 0 | Refills: 0 | DISCHARGE

## 2019-05-16 RX ORDER — SIMVASTATIN 20 MG/1
10 TABLET, FILM COATED ORAL AT BEDTIME
Refills: 0 | Status: DISCONTINUED | OUTPATIENT
Start: 2019-05-16 | End: 2019-05-22

## 2019-05-16 RX ORDER — BUDESONIDE AND FORMOTEROL FUMARATE DIHYDRATE 160; 4.5 UG/1; UG/1
2 AEROSOL RESPIRATORY (INHALATION)
Refills: 0 | Status: DISCONTINUED | OUTPATIENT
Start: 2019-05-16 | End: 2019-05-22

## 2019-05-16 RX ADMIN — ALBUTEROL 2 PUFF(S): 90 AEROSOL, METERED ORAL at 22:06

## 2019-05-16 RX ADMIN — Medication 4 MILLIGRAM(S): at 14:15

## 2019-05-16 RX ADMIN — Medication 4 MILLIGRAM(S): at 22:15

## 2019-05-16 RX ADMIN — Medication 102 MILLIGRAM(S): at 12:48

## 2019-05-16 RX ADMIN — TAMSULOSIN HYDROCHLORIDE 0.4 MILLIGRAM(S): 0.4 CAPSULE ORAL at 21:59

## 2019-05-16 RX ADMIN — CEFTRIAXONE 100 GRAM(S): 500 INJECTION, POWDER, FOR SOLUTION INTRAMUSCULAR; INTRAVENOUS at 04:00

## 2019-05-16 RX ADMIN — SIMVASTATIN 10 MILLIGRAM(S): 20 TABLET, FILM COATED ORAL at 22:00

## 2019-05-16 RX ADMIN — INSULIN GLARGINE 10 UNIT(S): 100 INJECTION, SOLUTION SUBCUTANEOUS at 21:59

## 2019-05-16 RX ADMIN — BUDESONIDE AND FORMOTEROL FUMARATE DIHYDRATE 2 PUFF(S): 160; 4.5 AEROSOL RESPIRATORY (INHALATION) at 22:16

## 2019-05-16 RX ADMIN — Medication 2: at 22:15

## 2019-05-16 RX ADMIN — Medication 81 MILLIGRAM(S): at 18:02

## 2019-05-16 RX ADMIN — Medication 500000 UNIT(S): at 22:15

## 2019-05-16 RX ADMIN — GABAPENTIN 200 MILLIGRAM(S): 400 CAPSULE ORAL at 22:00

## 2019-05-16 RX ADMIN — Medication 500000 UNIT(S): at 18:02

## 2019-05-16 RX ADMIN — Medication 200 MILLIGRAM(S): at 18:02

## 2019-05-16 RX ADMIN — LEVETIRACETAM 500 MILLIGRAM(S): 250 TABLET, FILM COATED ORAL at 21:59

## 2019-05-16 NOTE — PHYSICAL EXAM
[Ambulatory and capable of all self care but unable to carry out any work activities] : Status 2- Ambulatory and capable of all self care but unable to carry out any work activities. Up and about more than 50% of waking hours [Normal] : normoactive bowel sounds, soft and nontender, no hepatosplenomegaly or masses appreciated [de-identified] : b/l LE weakness

## 2019-05-16 NOTE — ASSESSMENT
[FreeTextEntry1] : 73m with RA and metastatic KRAS mutated lung adenocarcinoma with mets to brain, s/p 4/5 sessions of whole brain RT, presenting to discuss systemic treatments however with significant acute on chronic lower extremity weakness.\par \par ED referral for acute on chronic lower extremity weakness. \par Will need MRI brain, MRI spine. R/o cord compression. \par Rheum consult for RA. \par Palliative care consult\par RTC after hospital discharge

## 2019-05-16 NOTE — CONSULT NOTE ADULT - ASSESSMENT
73M hx of Metastatic NSCLC, RA, to [facility] ED from [facility] for acute on chronic weakness.  Rheumatology consulted given discontinuation of MTX    Regarding pts weakness, pt presents w L sided weakness, and new R prox LE weakness. Pt weakness most likely 2/2 to new progression of brain mets vs increase in vasogenic edema given GK tx. Things to consider is steroids myopathy, but process happened acutely, so less likely. Another thing to consider is paraneoplastic myositis 2/2 to his lung ca. However, again process is acute so less likely. Finally, pt does have hx of RA, so can pt have cervical spine compromise from subluxation. Pt does not have on physical exam findings suggestive of erosive RA however.     As far as MTX discontinuation, there is no contraindication given pt has no evidence of active activity currently on exam. Pt already on steroids, which is likely suppressing his RA. However, if pt does start immunotherapy he will have to be monitored, as this can flare his RA.     Recommend:  Can obtain CK  Would obtain cervical spine Xray, flexion/extension/open mouth  Pt does not need to be restarted on MTX, as pt has no active RA dz  Would f/u brain MRI to evaluate progression of brain mets    Nnamdi Hutton MD PGY4  [id number] 73M hx of Metastatic NSCLC, RA, to [facility] ED from [facility] for acute on chronic weakness.  Rheumatology consulted given discontinuation of MTX    Regarding pts weakness, pt presents w L sided weakness, and new R prox LE weakness. Pt weakness most likely 2/2 to progression of brain mets vs increase in vasogenic edema given GK tx. Things to consider is steroids myopathy, but process happened acutely, so less likely. Paraneoplastic myositis could be contributing, however is less likely , considering how acte the   process is. Finally, pt does have hx of RA, so can pt have cervical spine compromise from subluxation, however the pt  physical exam is not suggestive of myelopathy. C1-2 disease is usually seen in more aggressive erosive RA that the patient does not have.    The patient has no evidence of active  Rheumatoid arthritis and there is no indication to restart Methotrexate at this time., on steroids for brain mets tx .  However, if pt does start immunotherapy for lung cancer , it may increase the risk for RA reactivation.     Recommend:  Can obtain CK  Would obtain cervical spine Xray, flexion/extension/open mouth  Pt does not need to be restarted on MTX, as pt has no active RA dz  F/u brain MRI to evaluate progression of brain mets    Nnamdi Hutton MD PGY4  [id number]

## 2019-05-16 NOTE — CONSULT NOTE ADULT - ASSESSMENT
73M with Metastatic NSCLC (adeno), Former tobacco use, T2DM, Rheumatoid Arthritis (was on MTX, stopped since 2 weeks ago), Essential Hypertension referred to ED from [facility] Acute on Chronic weakness.   MRI T and L spine negative.     -Appreciate rad onc input  -MRI brain  -Increase dex to 4mg q8h, can decrease back to home dose if MRI brain without acute findings  -Neurology consult   -Rheumatology consult, patient was on MTX and it was held ~ 2 weeks ago   -Supportive care, pain control, Nutrition, PT, DVT ppx  -Outpatient oncology f/u    Will follow. Please do not hesitate to call back with questions.     Maribeth Waterman MD  Medical Oncology Attending 73M with Metastatic NSCLC (adeno) with multiple brain mets, s/p 4 out of 5 sessions of whole brain RT, Rheumatoid Arthritis (was on MTX, stopped since 2 weeks ago), Essential Hypertension referred to ED from [facility] for Acute on Chronic weakness.   MRI T and L spine negative.   Thrombocytopenia.    -Appreciate rad onc input  -MRI brain  -Increase dex to 4mg q8h, can decrease back to home dose if MRI brain without acute findings  -FSG checks and protonix while on steroids  -Neurology consult   -Rheumatology consult, patient was on MTX and it was held ~ 2 weeks ago  -Repeat CBC, monitor plt count  -Nystatin swish and swallow for oral thrush  -Supportive care, pain control, Nutrition, PT, DVT ppx  -Outpatient oncology f/u    Will follow. Please do not hesitate to call back with questions.     Maribeth Waterman MD  Medical Oncology Attending

## 2019-05-16 NOTE — H&P ADULT - HISTORY OF PRESENT ILLNESS
73M hx of Metastatic NSCLC, Former tobacco use, T2DM, Rheumatoid Arthritis, Essential Hypertension presents to [facility] ED from [facility] for Acute on Chronic weakness. As per patient, he has had LUE/LLE weakness since his diagnosis of lung cancer in March of this year. However lately in the last week it has been progressively more difficult for him to stand, walk or transfer from bed to chair because his lower legs are now both weak. Yesterday he had an appointment at Dr. Waterman's office and needed to be carried by his daughter and son in law to clinic. Because of the progressive weakness there was concern for cord compression, and patient was sent to the ED for evaluation.     In the ED patient underwent MRI of T-spine and L-spine. Also got Rocephin 1g IV x 1, and Percocet. Patient seen at bedside. Weakness he feels is getting worse, and complains of intermittent back pain. No fevers or chills

## 2019-05-16 NOTE — H&P ADULT - NSICDXPASTMEDICALHX_GEN_ALL_CORE_FT
PAST MEDICAL HISTORY:  Essential hypertension     Hyperlipidemia     Non-small cell lung cancer     Rheumatoid arthritis     Type 2 diabetes mellitus

## 2019-05-16 NOTE — CHART NOTE - NSCHARTNOTEFT_GEN_A_CORE
Mr. Rebolledo is a 73 year old man undergoing WBRT for h/o metastatic lung cancer with Dr. Estrada at [facility] and was sent to the ED for r/o cord compression c/o b/l leg weakness and being unable to ambulate.     He is s/p 9 of 10 planned radiation fractions to the brain.    Imaging as below has ruled out cord compression.     < from: MR Thoracic Spine w/wo IV Cont (05.15.19 @ 21:07) >    IMPRESSION:     No evidence of thoracic or lumbar spinal cord compression or osseous   metastatic disease.    Incompletely visualized right upper lobe mass, better characterized on   recent CT of the chest.      Upon discharge he can be seen for re-evaluation by Dr. Estrada to determine if the last remaining fraction can be given.   There is no role for inpatient radiation treatment now.    Thank you.    [phone] Mr. Rebolledo is a 73 year old man undergoing WBRT for h/o metastatic lung cancer with Dr. Estrada at [facility] and was sent to the ED for r/o cord compression c/o b/l leg weakness and being unable to ambulate.     He is s/p 9 of 10 planned radiation fractions to the brain.    Imaging as below has ruled out cord compression.     < from: MR Thoracic Spine w/wo IV Cont (05.15.19 @ 21:07) >    IMPRESSION:     No evidence of thoracic or lumbar spinal cord compression or osseous   metastatic disease.    Incompletely visualized right upper lobe mass, better characterized on   recent CT of the chest.      Upon discharge he can be seen for re-evaluation by Dr. Estrada to determine if the last remaining fraction can be given.   There is no role for inpatient radiation treatment now. I have discussed this today via email with dr Estrada, who agrees.    Thank you.    [phone] office      Pacheco Gastelum MD  cell

## 2019-05-16 NOTE — ED ADULT NURSE REASSESSMENT NOTE - NS ED NURSE REASSESS COMMENT FT1
Report received from break coverage MAINOR Blanco. No acute distress at present. VS as noted. Awaiting dispo. Call bell within reach. Will continue to monitor.

## 2019-05-16 NOTE — H&P ADULT - NSHPREVIEWOFSYSTEMS_GEN_ALL_CORE
REVIEW OF SYSTEMS:    CONSTITUTIONAL: No weakness, fevers or chills  EYES/ENT: No visual changes;  No vertigo or throat pain   NECK: No pain or stiffness  RESPIRATORY: No cough, wheezing, hemoptysis; No shortness of breath  CARDIOVASCULAR: No chest pain or palpitations  GASTROINTESTINAL: No abdominal or epigastric pain. No nausea, vomiting, or hematemesis; No diarrhea or constipation. No melena or hematochezia.  GENITOURINARY: No dysuria, frequency or hematuria  NEUROLOGICAL: (+) B/L LE weakness, Left worse than right, chronic LUE weakness   SKIN: No itching, burning, rashes, or lesions   All other review of systems is negative unless indicated above.

## 2019-05-16 NOTE — H&P ADULT - NSHPPHYSICALEXAM_GEN_ALL_CORE
Vital Signs Last 24 Hrs  T(C): 37.2 (16 May 2019 10:36), Max: 37.2 (16 May 2019 10:36)  T(F): 99 (16 May 2019 10:36), Max: 99 (16 May 2019 10:36)  HR: 84 (16 May 2019 10:36) (62 - 84)  BP: 151/74 (16 May 2019 10:36) (109/60 - 151/74)  BP(mean): --  RR: 18 (16 May 2019 10:36) (16 - 22)  SpO2: 97% (16 May 2019 10:36) (92% - 100%)    General: uncomfortable appearing 2/2 back pain and weakness of legs, appears chronically ill   HEENT: EOMI, no conjunctival pallor, MMM, no JVD, no thyromegaly, neck supple, trachea midline  CV: S1S2 RRR no MRG  Lungs: CTA BL  Abdomen: soft NTND +BS   Extremities: No CCE +WWP  Skin/MSK: No rashes, poor active ROM of LUE/LLE   Neuro: AAOx3, LUE/LLE 2/5 strength, 4/5 strength of RLE, 5/5 strength RUE Vital Signs Last 24 Hrs  T(C): 37.2 (16 May 2019 10:36), Max: 37.2 (16 May 2019 10:36)  T(F): 99 (16 May 2019 10:36), Max: 99 (16 May 2019 10:36)  HR: 84 (16 May 2019 10:36) (62 - 84)  BP: 151/74 (16 May 2019 10:36) (109/60 - 151/74)  BP(mean): --  RR: 18 (16 May 2019 10:36) (16 - 22)  SpO2: 97% (16 May 2019 10:36) (92% - 100%)    General: uncomfortable appearing 2/2 back pain and weakness of legs, appears chronically ill   HEENT: EOMI, no conjunctival pallor, MMM, no JVD, no thyromegaly, neck supple, trachea midline, (+) oral thrush   CV: S1S2 RRR no MRG  Lungs: CTA BL  Abdomen: soft NTND +BS   Extremities: No CCE +WWP  Skin/MSK: No rashes, poor active ROM of LUE/LLE   Neuro: AAOx3, LUE/LLE 2/5 strength, 4/5 strength of RLE, 5/5 strength RUE

## 2019-05-16 NOTE — H&P ADULT - PROBLEM SELECTOR PLAN 1
DDx includes paraneoplastic syndrome versus worsened brain metastases. MRI negative for spinal cord compression  - Neurology consultation for input on differential diagnosis   - MRI Brain with/without contrast  - As per primary Oncologist, Radiation Oncology team to see patient for consultation   - Fall precautions, PT/OT

## 2019-05-16 NOTE — H&P ADULT - NSHPLABSRESULTS_GEN_ALL_CORE
CBC Full  -  ( 15 May 2019 16:50 )  WBC Count : 6.03 K/uL  RBC Count : 5.45 M/uL  Hemoglobin : 14.6 g/dL  Hematocrit : 46.7 %  Platelet Count - Automated : 65 K/uL  Mean Cell Volume : 85.7 fL  Mean Cell Hemoglobin : 26.8 pg  Mean Cell Hemoglobin Concentration : 31.3 %  Auto Neutrophil # : 3.98 K/uL  Auto Lymphocyte # : 1.22 K/uL  Auto Monocyte # : 0.37 K/uL  Auto Eosinophil # : 0.09 K/uL  Auto Basophil # : 0.05 K/uL  Auto Neutrophil % : 66.1 %  Auto Lymphocyte % : 20.2 %  Auto Monocyte % : 6.1 %  Auto Eosinophil % : 1.5 %  Auto Basophil % : 0.8 %    05-15    138  |  101  |  24<H>  ----------------------------<  244<H>  3.8   |  25  |  0.91    Ca    9.5      15 May 2019 16:50    TPro  6.9  /  Alb  3.7  /  TBili  0.5  /  DBili  x   /  AST  12  /  ALT  23  /  AlkPhos  71  05-15    < from: MR Thoracic Spine w/wo IV Cont (05.15.19 @ 21:07) >  < from: MR Lumbar Spine w/wo IV Cont (05.15.19 @ 21:07) >  No evidence of thoracic or lumbar spinal cord compression or osseous   metastatic disease.  Incompletely visualized right upper lobe mass, better characterized on   recent CT of the chest.  < end of copied text >    < from: CT Head No Cont (05.15.19 @ 22:46) >  No acute intracranial hemorrhage. Known intracranial metastasis with associated vasogenic edema as described. Follow-up contrast-enhanced MRI would be helpful to assess interval change.   < end of copied text >    Urinalysis (05.15.19 @ 18:10)    Color: YELLOW    Urine Appearance: CLEAR    Glucose: 300    Bilirubin: NEGATIVE    Ketone - Urine: NEGATIVE    Specific Gravity: 1.019    Blood: SMALL    pH - Urine: 6.0    Protein, Urine: 30    Urobilinogen: NORMAL    Nitrite: NEGATIVE    Leukocyte Esterase Concentration: MODERATE    Red Blood Cell - Urine: 3-5    White Blood Cell - Urine: 11-25    Hyaline Casts: NEGATIVE    Bacteria: NEGATIVE    Squamous Epithelial: OCC

## 2019-05-16 NOTE — H&P ADULT - ASSESSMENT
73M hx of Metastatic NSCLC, T2DM, HTN, Rheumatoid Arthritis being admitted for acute on chronic weakness

## 2019-05-16 NOTE — CONSULT NOTE ADULT - SUBJECTIVE AND OBJECTIVE BOX
HPI:  73M hx of Metastatic NSCLC, Former tobacco use, T2DM, Rheumatoid Arthritis, Essential Hypertension presents to [facility] ED from [facility] for Acute on Chronic weakness. He has a known brain mass, right sided and parasagittal area. Last CTH showed a signifincat edema around the mass, mainly it appears to be on right frontal, parasagital area, affaecting left parasagital, can cause BL LE weakenss. Also there is a left M1 hypodensity concerning for new lesion, with some edema, but no visible mass on CTH. MRI brain is recommended.      MEDICATIONS  (STANDING):  dexamethasone  Injectable 4 milliGRAM(s) IV Push every 8 hours  dexamethasone  IVPB 10 milliGRAM(s) IV Intermittent once    MEDICATIONS  (PRN):    PAST MEDICAL & SURGICAL HISTORY:  Non-small cell lung cancer  Hyperlipidemia  Rheumatoid arthritis  Essential hypertension  Type 2 diabetes mellitus  S/P cholecystectomy    FAMILY HISTORY:  FH: leukemia  FH: lung cancer    Allergies    No Known Allergies    Intolerances    SHx - No smoking, No ETOH, No drug abuse    Review of Systems:  NEUROLOGICAL: See HPI    Vital Signs Last 24 Hrs  T(C): 37.2 (16 May 2019 10:36), Max: 37.2 (16 May 2019 10:36)  T(F): 99 (16 May 2019 10:36), Max: 99 (16 May 2019 10:36)  HR: 84 (16 May 2019 10:36) (62 - 84)  BP: 151/74 (16 May 2019 10:36) (109/60 - 151/74)  BP(mean): --  RR: 18 (16 May 2019 10:36) (16 - 22)  SpO2: 97% (16 May 2019 10:36) (92% - 100%)    General Exam:   General appearance: No acute distress                   Neurological Exam:      Other:    05-15    138  |  101  |  24<H>  ----------------------------<  244<H>  3.8   |  25  |  0.91    Ca    9.5      15 May 2019 16:50    TPro  6.9  /  Alb  3.7  /  TBili  0.5  /  DBili  x   /  AST  12  /  ALT  23  /  AlkPhos  71  05-15                          14.6   6.03  )-----------( 65       ( 15 May 2019 16:50 )             46.7       Radiology  < from: CT Head No Cont (05.15.19 @ 22:46) >  FINDINGS: There is no obvious acute intracranial hemorrhage or midline   shift. Again noted are scattered metastatic lesions, for example, in the   high right medial frontal region and anterior to the right glen, with   associated vasogenic edema in the high right frontoparietal > left   superior frontal and left anterior inferior frontal region. Nonspecific   mild periventricular and subcortical white matter lucencies may be in   part related to chronic microvessel ischemic changes. There is stable   mild cerebral volume loss with commensurate ventricular dilatation.    There is no displaced skull fracture. There is minimal mucosal thickening   of the sinuses. The right tympanomastoid region is unremarkable. Again   noted is left mastoid opacification.    IMPRESSION:    No acute intracranial hemorrhage. Known intracranial metastasis with   associated vasogenic edema as described. Follow-up contrast-enhanced MRI   would be helpful to assess interval change.    < from: MR Lumbar Spine w/wo IV Cont (05.15.19 @ 21:07) >  IMPRESSION:     No evidence of thoracic or lumbar spinal cord compression or osseous   metastatic disease.    Incompletely visualized right upper lobe mass, better characterized on   recent CT of the chest.    < end of copied text >      < end of copied text > HPI:  73M hx of Metastatic NSCLC, Former tobacco use, T2DM, Rheumatoid Arthritis, Essential Hypertension presents to [facility] ED from [facility] for Acute on Chronic weakness, today he had last radiation of brain radiation. Family at bedside, states the patient has been getting progressively weak past 2 weeks. He has a known brain mass, right sided and parasagittal area. Last CTH showed a significant edema around the mass, mainly it appears to be on right frontal, parasagittal area, affecting left parasagittal, can cause BL LE weakenss. Also there is a left M1 hypodensity concerning for new lesion, with some edema, but no visible mass on CTH. MRI brain is recommended.      MEDICATIONS  (STANDING):  dexamethasone  Injectable 4 milliGRAM(s) IV Push every 8 hours  dexamethasone  IVPB 10 milliGRAM(s) IV Intermittent once    MEDICATIONS  (PRN):    PAST MEDICAL & SURGICAL HISTORY:  Non-small cell lung cancer  Hyperlipidemia  Rheumatoid arthritis  Essential hypertension  Type 2 diabetes mellitus  S/P cholecystectomy    FAMILY HISTORY:  FH: leukemia  FH: lung cancer    Allergies    No Known Allergies    Intolerances    SHx - No smoking, No ETOH, No drug abuse    Review of Systems:  NEUROLOGICAL: See HPI    Vital Signs Last 24 Hrs  T(C): 37.2 (16 May 2019 10:36), Max: 37.2 (16 May 2019 10:36)  T(F): 99 (16 May 2019 10:36), Max: 99 (16 May 2019 10:36)  HR: 84 (16 May 2019 10:36) (62 - 84)  BP: 151/74 (16 May 2019 10:36) (109/60 - 151/74)  BP(mean): --  RR: 18 (16 May 2019 10:36) (16 - 22)  SpO2: 97% (16 May 2019 10:36) (92% - 100%)    General Exam:   General appearance: No acute distress                   Neurological Exam:  A&O x 2  CN II, III: MARIAM  CN III, IV, VI: EOMI  CN V: facial sensation grossly intact  CN VII: no facial asymmetry or facial droop  CN IX, X, XII: tongue protrusion normal, uvula normal  Motor UE: Right no drift, 4+,5/5 through out  Left no drift, not a full effort exam, 4,4+/5  LLE 3/5 proximal, 5/5 distal  RLE 4/5 proximal, 5/5 distal  Babinski present on the left, mute on the right    Other:    05-15    138  |  101  |  24<H>  ----------------------------<  244<H>  3.8   |  25  |  0.91    Ca    9.5      15 May 2019 16:50    TPro  6.9  /  Alb  3.7  /  TBili  0.5  /  DBili  x   /  AST  12  /  ALT  23  /  AlkPhos  71  05-15                          14.6   6.03  )-----------( 65       ( 15 May 2019 16:50 )             46.7       Radiology  < from: CT Head No Cont (05.15.19 @ 22:46) >  FINDINGS: There is no obvious acute intracranial hemorrhage or midline   shift. Again noted are scattered metastatic lesions, for example, in the   high right medial frontal region and anterior to the right glen, with   associated vasogenic edema in the high right frontoparietal > left   superior frontal and left anterior inferior frontal region. Nonspecific   mild periventricular and subcortical white matter lucencies may be in   part related to chronic microvessel ischemic changes. There is stable   mild cerebral volume loss with commensurate ventricular dilatation.    There is no displaced skull fracture. There is minimal mucosal thickening   of the sinuses. The right tympanomastoid region is unremarkable. Again   noted is left mastoid opacification.    IMPRESSION:    No acute intracranial hemorrhage. Known intracranial metastasis with   associated vasogenic edema as described. Follow-up contrast-enhanced MRI   would be helpful to assess interval change.    < from: MR Lumbar Spine w/wo IV Cont (05.15.19 @ 21:07) >  IMPRESSION:     No evidence of thoracic or lumbar spinal cord compression or osseous   metastatic disease.    Incompletely visualized right upper lobe mass, better characterized on   recent CT of the chest.    < end of copied text >      < end of copied text > HPI:  73M hx of Metastatic NSCLC, Former tobacco use, T2DM, Rheumatoid Arthritis, Essential Hypertension [facility] on Chronic weakness, today he had last radiation of brain radiation. Family at bedside, states the patient has been getting progressively weak past 2 weeks. He has a known brain mass, right sided and parasagittal area. Last CTH showed a significant edema around the mass, mainly it appears to be on right frontal, parasagittal area, affecting left parasagittal, can cause BL LE weakenss. Also there is a left M1 hypodensity concerning for new lesion, with some edema, but no visible mass on CTH. MRI brain is recommended. Neurology was consulted to r/o paraneoplastic syndrome.      MEDICATIONS  (STANDING):  dexamethasone  Injectable 4 milliGRAM(s) IV Push every 8 hours  dexamethasone  IVPB 10 milliGRAM(s) IV Intermittent once    MEDICATIONS  (PRN):    PAST MEDICAL & SURGICAL HISTORY:  Non-small cell lung cancer  Hyperlipidemia  Rheumatoid arthritis  Essential hypertension  Type 2 diabetes mellitus  S/P cholecystectomy    FAMILY HISTORY:  FH: leukemia  FH: lung cancer    Allergies    No Known Allergies    Intolerances    SHx - No smoking, No ETOH, No drug abuse    Review of Systems:  NEUROLOGICAL: See HPI    Vital Signs Last 24 Hrs  T(C): 37.2 (16 May 2019 10:36), Max: 37.2 (16 May 2019 10:36)  T(F): 99 (16 May 2019 10:36), Max: 99 (16 May 2019 10:36)  HR: 84 (16 May 2019 10:36) (62 - 84)  BP: 151/74 (16 May 2019 10:36) (109/60 - 151/74)  BP(mean): --  RR: 18 (16 May 2019 10:36) (16 - 22)  SpO2: 97% (16 May 2019 10:36) (92% - 100%)    General Exam:   General appearance: No acute distress                   Neurological Exam:  A&O x 2  CN II, III: MARIAM  CN III, IV, VI: EOMI  CN V: facial sensation grossly intact  CN VII: no facial asymmetry or facial droop  CN IX, X, XII: tongue protrusion normal, uvula normal  Motor UE: Right no drift, 4+,5/5 through out  Left no drift, not a full effort exam, 4,4+/5  LLE 3/5 proximal, 5/5 distal  RLE 4/5 proximal, 5/5 distal  Babinski present on the left, mute on the right    Other:    05-15    138  |  101  |  24<H>  ----------------------------<  244<H>  3.8   |  25  |  0.91    Ca    9.5      15 May 2019 16:50    TPro  6.9  /  Alb  3.7  /  TBili  0.5  /  DBili  x   /  AST  12  /  ALT  23  /  AlkPhos  71  05-15                          14.6   6.03  )-----------( 65       ( 15 May 2019 16:50 )             46.7       Radiology  < from: CT Head No Cont (05.15.19 @ 22:46) >  FINDINGS: There is no obvious acute intracranial hemorrhage or midline   shift. Again noted are scattered metastatic lesions, for example, in the   high right medial frontal region and anterior to the right glen, with   associated vasogenic edema in the high right frontoparietal > left   superior frontal and left anterior inferior frontal region. Nonspecific   mild periventricular and subcortical white matter lucencies may be in   part related to chronic microvessel ischemic changes. There is stable   mild cerebral volume loss with commensurate ventricular dilatation.    There is no displaced skull fracture. There is minimal mucosal thickening   of the sinuses. The right tympanomastoid region is unremarkable. Again   noted is left mastoid opacification.    IMPRESSION:    No acute intracranial hemorrhage. Known intracranial metastasis with   associated vasogenic edema as described. Follow-up contrast-enhanced MRI   would be helpful to assess interval change.    < from: MR Lumbar Spine w/wo IV Cont (05.15.19 @ 21:07) >  IMPRESSION:     No evidence of thoracic or lumbar spinal cord compression or osseous   metastatic disease.    Incompletely visualized right upper lobe mass, better characterized on   recent CT of the chest.    < end of copied text >      < end of copied text >

## 2019-05-16 NOTE — CONSULT NOTE ADULT - SUBJECTIVE AND OBJECTIVE BOX
Patient is a 73y old  Male who presents with a chief complaint of B/L LE weakness (16 May 2019 11:26)      HPI:  73M with Metastatic NSCLC (adeno), Former tobacco use, T2DM, Rheumatoid Arthritis (was on MTX, stopped since 2 weeks ago), Essential Hypertension referred to ED from [facility] on Chronic weakness. As per patient, he has had LUE/LLE weakness since his diagnosis of lung cancer in March of this year. However lately in the last week it has been progressively more difficult for him to stand, walk or transfer from bed to chair because his lower legs are now both weak. Yesterday he had an appointment at Dr. Waterman's office and needed to be carried by his daughter and son in law to clinic. Because of the progressive weakness there was concern for cord compression, and patient was sent to the ED for evaluation.     In the ED patient underwent MRI of T-spine and L-spine. Also got Rocephin 1g IV x 1, and Percocet. Patient seen at bedside. Weakness he feels is getting worse, and complains of intermittent back pain. No fevers or chills (16 May 2019 11:26)      ROS: as above     PAST MEDICAL & SURGICAL HISTORY:  Non-small cell lung cancer  Hyperlipidemia  Rheumatoid arthritis  Essential hypertension  Type 2 diabetes mellitus  S/P cholecystectomy      SOCIAL HISTORY:    FAMILY HISTORY:  FH: leukemia  FH: lung cancer      MEDICATIONS  (STANDING):  dexamethasone  Injectable 4 milliGRAM(s) IV Push every 8 hours  dexamethasone  IVPB 10 milliGRAM(s) IV Intermittent once    MEDICATIONS  (PRN):      Allergies    No Known Allergies    Intolerances        Vital Signs Last 24 Hrs  T(C): 37.2 (16 May 2019 10:36), Max: 37.2 (16 May 2019 10:36)  T(F): 99 (16 May 2019 10:36), Max: 99 (16 May 2019 10:36)  HR: 84 (16 May 2019 10:36) (62 - 84)  BP: 151/74 (16 May 2019 10:36) (109/60 - 151/74)  BP(mean): --  RR: 18 (16 May 2019 10:36) (16 - 22)  SpO2: 97% (16 May 2019 10:36) (92% - 100%)    PHYSICAL EXAM  General: adult in NAD  HEENT: clear oropharynx, anicteric sclera, pink conjunctiva  Neck: supple  CV: normal S1/S2 with no murmur rubs or gallops  Lungs: positive air movement b/l ant lungs, clear to auscultation, no wheezes, no rales  Abdomen: soft non-tender non-distended, no hepatosplenomegaly  Ext: no clubbing cyanosis or edema  Skin: no rashes and no petechiae  Neuro: alert and oriented X 3, none focal    LABS:                          14.6   6.03  )-----------( 65       ( 15 May 2019 16:50 )             46.7         Mean Cell Volume : 85.7 fL  Mean Cell Hemoglobin : 26.8 pg  Mean Cell Hemoglobin Concentration : 31.3 %  Auto Neutrophil # : 3.98 K/uL  Auto Lymphocyte # : 1.22 K/uL  Auto Monocyte # : 0.37 K/uL  Auto Eosinophil # : 0.09 K/uL  Auto Basophil # : 0.05 K/uL  Auto Neutrophil % : 66.1 %  Auto Lymphocyte % : 20.2 %  Auto Monocyte % : 6.1 %  Auto Eosinophil % : 1.5 %  Auto Basophil % : 0.8 %      Serial CBC's  05-15 @ 16:50  Hct-46.7 / Hgb-14.6 / Plat-65 / RBC-5.45 / WBC-6.03      05-15    138  |  101  |  24<H>  ----------------------------<  244<H>  3.8   |  25  |  0.91    Ca    9.5      15 May 2019 16:50    TPro  6.9  /  Alb  3.7  /  TBili  0.5  /  DBili  x   /  AST  12  /  ALT  23  /  AlkPhos  71  05-15      PT/INR - ( 15 May 2019 18:10 )   PT: 12.4 SEC;   INR: 1.08          PTT - ( 15 May 2019 18:10 )  PTT:30.2 SEC                RADIOLOGY & ADDITIONAL STUDIES: Patient is a 73y old  Male who presents with a chief complaint of B/L LE weakness (16 May 2019 11:26)      HPI:  73M with Metastatic NSCLC (adeno) with multiple mets to brain, s/p 4 out of 5 sessions of whole brain RT, Former tobacco use, T2DM, Rheumatoid Arthritis (was on MTX, stopped since 2 weeks ago), Essential Hypertension referred to ED from [facility] for Acute on Chronic weakness. As per patient, he has had LUE/LLE weakness since his diagnosis of lung cancer in March of this year. However lately in the last week it has been progressively more difficult for him to stand, walk or transfer from bed to chair because his lower legs are now both weak. Yesterday he had an appointment at my office and needed to be carried by his daughter and son in law to clinic. Because of the progressive weakness there was concern for cord compression, and patient was sent to the ED for evaluation.     In the ED patient underwent MRI of T-spine and L-spine. Also got Rocephin 1g IV x 1, and Percocet. Patient seen at bedside. Weakness he feels is getting worse, and complains of intermittent back pain. No fevers or chills (16 May 2019 11:26)      ROS: as above     PAST MEDICAL & SURGICAL HISTORY:  Non-small cell lung cancer  Hyperlipidemia  Rheumatoid arthritis  Essential hypertension  Type 2 diabetes mellitus  S/P cholecystectomy      SOCIAL HISTORY:    FAMILY HISTORY:  FH: leukemia  FH: lung cancer      MEDICATIONS  (STANDING):  dexamethasone  Injectable 4 milliGRAM(s) IV Push every 8 hours  dexamethasone  IVPB 10 milliGRAM(s) IV Intermittent once    MEDICATIONS  (PRN):      Allergies    No Known Allergies    Intolerances        Vital Signs Last 24 Hrs  T(C): 37.2 (16 May 2019 10:36), Max: 37.2 (16 May 2019 10:36)  T(F): 99 (16 May 2019 10:36), Max: 99 (16 May 2019 10:36)  HR: 84 (16 May 2019 10:36) (62 - 84)  BP: 151/74 (16 May 2019 10:36) (109/60 - 151/74)  BP(mean): --  RR: 18 (16 May 2019 10:36) (16 - 22)  SpO2: 97% (16 May 2019 10:36) (92% - 100%)    PHYSICAL EXAM  General: adult in NAD, weak  HEENT: + thrush, anicteric sclera, pink conjunctiva  Neck: supple  CV: normal S1/S2 with no murmur rubs or gallops  Lungs: positive air movement b/l ant lungs  Abdomen: soft non-tender non-distended  Ext: no clubbing cyanosis or edema  Skin: no rashes and no petechiae  Neuro: alert and oriented X 3, none focal    LABS:                          14.6   6.03  )-----------( 65       ( 15 May 2019 16:50 )             46.7         Mean Cell Volume : 85.7 fL  Mean Cell Hemoglobin : 26.8 pg  Mean Cell Hemoglobin Concentration : 31.3 %  Auto Neutrophil # : 3.98 K/uL  Auto Lymphocyte # : 1.22 K/uL  Auto Monocyte # : 0.37 K/uL  Auto Eosinophil # : 0.09 K/uL  Auto Basophil # : 0.05 K/uL  Auto Neutrophil % : 66.1 %  Auto Lymphocyte % : 20.2 %  Auto Monocyte % : 6.1 %  Auto Eosinophil % : 1.5 %  Auto Basophil % : 0.8 %      Serial CBC's  05-15 @ 16:50  Hct-46.7 / Hgb-14.6 / Plat-65 / RBC-5.45 / WBC-6.03      05-15    138  |  101  |  24<H>  ----------------------------<  244<H>  3.8   |  25  |  0.91    Ca    9.5      15 May 2019 16:50    TPro  6.9  /  Alb  3.7  /  TBili  0.5  /  DBili  x   /  AST  12  /  ALT  23  /  AlkPhos  71  05-15      PT/INR - ( 15 May 2019 18:10 )   PT: 12.4 SEC;   INR: 1.08          PTT - ( 15 May 2019 18:10 )  PTT:30.2 SEC    RADIOLOGY & ADDITIONAL STUDIES:    reviewed

## 2019-05-16 NOTE — CONSULT NOTE ADULT - SUBJECTIVE AND OBJECTIVE BOX
MD MENSAH  [id number]    HISTORY OF PRESENT ILLNESS:  73M hx of Metastatic NSCLC, Former tobacco use, T2DM, Rheumatoid Arthritis, Essential Hypertension presents to [facility] ED from [facility] for Acute on Chronic weakness. As per patient, he has had LUE/LLE weakness since his diagnosis of lung cancer in March of this year. However lately in the last week it has been progressively more difficult for him to stand, walk or transfer from bed to chair because his lower legs are now both weak. Yesterday he had an appointment at Dr. Waterman's office and needed to be carried by his daughter and son in law to clinic. Because of the progressive weakness there was concern for cord compression, and patient was sent to the ED for evaluation.     In the ED patient underwent MRI of T-spine and L-spine. Also got Rocephin 1g IV x 1, and Percocet. Patient seen at bedside. Weakness he feels is getting worse, and complains of intermittent back pain. No fevers or chills       PAST MEDICAL & SURGICAL HISTORY:  Non-small cell lung cancer  Hyperlipidemia  Rheumatoid arthritis  Essential hypertension  Type 2 diabetes mellitus  S/P cholecystectomy      Review of Systems:  Gen:  No fevers/chills, weight loss  HEENT: No blurry vision, no difficulty swallowing  CVS: No chest pain/palpitations  Resp: No SOB/wheezing  GI: No N/V/C/D/abdominal pain  MSK:  Skin: No new rashes  Neuro: No headaches    MEDICATIONS  (STANDING):  aspirin enteric coated 81 milliGRAM(s) Oral daily  buDESOnide  80 MICROgram(s)/formoterol 4.5 MICROgram(s) Inhaler 2 Puff(s) Inhalation two times a day  dexamethasone  Injectable 4 milliGRAM(s) IV Push every 8 hours  gabapentin 200 milliGRAM(s) Oral at bedtime  hydroxychloroquine 200 milliGRAM(s) Oral two times a day  insulin glargine Injectable (LANTUS) 10 Unit(s) SubCutaneous two times a day  levETIRAcetam 500 milliGRAM(s) Oral two times a day  losartan 25 milliGRAM(s) Oral daily  simvastatin 10 milliGRAM(s) Oral at bedtime  tamsulosin 0.4 milliGRAM(s) Oral at bedtime  tiotropium 18 MICROgram(s) Capsule 1 Capsule(s) Inhalation daily    MEDICATIONS  (PRN):  ALBUTerol    90 MICROgram(s) HFA Inhaler 2 Puff(s) Inhalation every 6 hours PRN Shortness of Breath and/or Wheezing      Allergies    No Known Allergies    Intolerances        PERTINENT MEDICATION HISTORY:      · 	gabapentin 100 mg oral capsule: 2 cap(s) orally once a day (at bedtime), Last Dose Taken:    · 	methotrexate 2.5 mg oral tablet: 6 tab(s) orally once a week on Friday , Last Dose Taken:    · 	hydroxychloroquine 200 mg oral tablet: 1 tab(s) orally 2 times a day, Last Dose Taken:    · 	zafirlukast 10 mg oral tablet: 1 tab(s) orally 2 times a day, Last Dose Taken:    · 	folic acid 1 mg oral tablet: 1 tab(s) orally once a day, Last Dose Taken:    · 	dexamethasone 2 mg oral tablet: 1 tab(s) orally 2 times a day, Last Dose Taken:          SOCIAL HISTORY:  No alcohol or drug use. Former tobacco use    FAMILY HISTORY:  FH: leukemia  FH: lung cancer      Vital Signs Last 24 Hrs  T(C): 37.2 (16 May 2019 10:36), Max: 37.2 (16 May 2019 10:36)  T(F): 99 (16 May 2019 10:36), Max: 99 (16 May 2019 10:36)  HR: 84 (16 May 2019 10:36) (62 - 84)  BP: 151/74 (16 May 2019 10:36) (109/60 - 151/74)  BP(mean): --  RR: 18 (16 May 2019 10:36) (16 - 22)  SpO2: 97% (16 May 2019 10:36) (92% - 100%)    Daily     Daily     Physical Exam:  General: No apparent distress  HEENT: EOMI, MMM  CVS: +S1/S2, RRR  Resp: CTA b/l  GI: Soft, NT/ND  MSK:    Neuro: AAOx3  muscle strength RUE LUE ; RLE LLE   Skin: no  rashes    LABS:                        14.6   6.03  )-----------( 65       ( 15 May 2019 16:50 )             46.7     05-15    138  |  101  |  24<H>  ----------------------------<  244<H>  3.8   |  25  |  0.91    Ca    9.5      15 May 2019 16:50    TPro  6.9  /  Alb  3.7  /  TBili  0.5  /  DBili  x   /  AST  12  /  ALT  23  /  AlkPhos  71  05-15    PT/INR - ( 15 May 2019 18:10 )   PT: 12.4 SEC;   INR: 1.08          PTT - ( 15 May 2019 18:10 )  PTT:30.2 SEC  Urinalysis Basic - ( 15 May 2019 18:10 )    Color: YELLOW / Appearance: CLEAR / S.019 / pH: 6.0  Gluc: 300 / Ketone: NEGATIVE  / Bili: NEGATIVE / Urobili: NORMAL   Blood: SMALL / Protein: 30 / Nitrite: NEGATIVE   Leuk Esterase: MODERATE / RBC: 3-5 / WBC 11-25   Sq Epi: OCC / Non Sq Epi: x / Bacteria: NEGATIVE        RADIOLOGY & ADDITIONAL STUDIES:    < from: MR Thoracic Spine w/wo IV Cont (05.15.19 @ 21:07) >  EXAM:  MR SPINE LUMBAR WAW IC      EXAM:  MR SPINE THORACIC WAW IC        PROCEDURE DATE:  May 15 2019         INTERPRETATION:  CLINICAL INFORMATION: Metastatic lung cancer. Lower   extremity weakness.    TECHNIQUE: Contrast enhanced MR of the lumbar spine was performed.   Sagittal and axial T1 and T2-weighted images through the lumbar spine   were obtained. Sagittal T2, T1, STIR, T1 postcontrast axial T1, T2, and   T1 postcontrast images were obtained. 7.5 cc of Gadavist were   administered with 0 cc discarded.    COMPARISON: CT abdomen/pelvis 2019.    FINDINGS:    THORACIC SPINE:    The thoracic cord is normal in signal.    Vertebral body heights are maintained. Signal arising from bone is normal.    The normal thoracic lordosis is maintained.    Multilevel disc desiccation, most prominently involving T11-T12.    There is an asymmetric central disc bulge with posterior osseous ridging   at C6-C7 that causes minimal spinal canal narrowing. Since this is seen   at the edge of the film dedicated imaging of the cervical spine with MRI   can be done for further evaluation. Otherwise, no significant   neuroforaminal or thoracic spinal canal stenosis.    There is an incompletely visualized enhancing mass in the right upper   lobe, better characterized on recent CT chest from 2019. Partially   demonstrated lymph node involving the right neck region is again seen   compressing the adjacent internal jugular vein. This finding was   demonstrated on prior CT scan chest. Mild symmetric atrophy of the   paraspinal musculature, likely related to chronic disuse.    LUMBAR SPINE:    The distal cord and conus are normal in signal and morphology,   terminating at around the level of L1.    Vertebral body heights are maintained. Signal arising from bone is normal.    The normal lumbar lordosis is maintained. Grade 1 anterolisthesis of L4   on L5.    Multilevel disc desiccation without significant height loss.    Examination of the neuroforamina and spinal canal are as follows:    L1/L2: No neuroforaminal or spinal stenosis.  L2/L3: There is a mild diffuse disc bulge without neural foraminal or   spinal stenosis.  L3/L4: No neuroforaminal or spinal stenosis.  L4/L5: A diffuse central disc bulge superimposed with facet arthrosisand   ligamentum flavum hypertrophy cause mild bilateral foraminal stenosis. No   spinal stenosis.  L5/S1: No neuroforaminal or spinal stenosis. Clinical indication: Lower   extremity weakness.        Visualized portions of the SI joints are unremarkable. Mild symmetric   atrophy of the paraspinal musculature, likely related to chronic disuse.    Small bilateral renal cysts. Visualized intrapelvic contents are   otherwise unremarkable.    IMPRESSION:     No evidence of thoracic or lumbar spinal cord compression or osseous   metastatic disease.    Incompletely visualized right upper lobe mass, better characterized on   recent CT of the chest.              RIAZ PERKINS M.D., RADIOLOGY RESIDENT  This document has been electronically signed.  CHANELLE GARCIA M.D., ATTENDING RADIOLOGIST  This document has been electronically signed. May 16 2019 10:55AM                  < end of copied text >      < from: CT Head No Cont (05.15.19 @ 22:46) >  EXAM:  CT BRAIN        PROCEDURE DATE:  May 15 2019         INTERPRETATION:  NONCONTRAST CT OF THE BRAIN DATED 5/15/2019.    CLINICAL HISTORY: Left lower extremity neuro deficit.    TECHNIQUE: Axial CT images are obtained from the cranial vertex tothe   skull base without the administration of IV contrast. Coronal and   sagittal reformats were obtained.    COMPARISON: CT brain dated 3/17/2019. MRI dated 3/18/2019 and 2019    FINDINGS: There is no obvious acute intracranial hemorrhage or midline   shift. Again noted are scattered metastatic lesions, for example, in the   high right medial frontal region and anterior to the right glen, with   associated vasogenic edema in the high right frontoparietal > left   superior frontal and left anterior inferior frontal region. Nonspecific   mild periventricular and subcortical white matter lucencies may be in   part related to chronic microvessel ischemic changes. There is stable   mild cerebral volume loss with commensurate ventricular dilatation.    There is no displaced skull fracture. There is minimal mucosal thickening   of the sinuses. The right tympanomastoid region is unremarkable. Again   noted is left mastoid opacification.    IMPRESSION:    No acute intracranial hemorrhage. Known intracranial metastasis with   associated vasogenic edema as described. Follow-up contrast-enhanced MRI   would be helpful to assess interval change.              COLUMBA JESUS M.D., RADIOLOGY RESIDENT  This document has been electronically signed.  DENITA MENDOZA M.D., ATTENDING RADIOLOGIST  This document has been electronically signed. 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    COMPARISON: CT of the chest, abdomen, and pelvis from outside hospital   dated 3/18/2019.    PROCEDURE:   CT of the Chest, Abdomen and Pelvis was performed with intravenous   contrast.   Intravenous contrast: 90 ml Omnipaque 350. 10 ml discarded.  Oral contrast: Positive contrast was administered.  Sagittal and coronal reformats were performed.    FINDINGS:    CHEST:     LUNGS AND LARGE AIRWAYS: Patent central airways. Ill-defined right upper   lobe mass measures 3.0 x 3.5 cm (series 2 image 26), previously 3.3 x 3.9   cm. Scattered subcentimeter bilateral pulmonary nodules are noted with a   reference nodule measuring 4 mm in the left upper lobe (2, 19),   previously 5 mm. A 5 mm left in the lower lobe nodule (2, 56) is   unchanged since 3/18/2019. Right middle lobe atelectasis.  PLEURA: Trace right pleural effusion.  VESSELS: Atheromatous disease of the aorta. Coronary artery   calcifications.  HEART: Heart size is normal. No pericardial effusion.  MEDIASTINUM AND JASPAL: Extensive mediastinal and hilar lymphadenopathy.  -A right lower paratracheal node measures 2.3 x 3.3 cm (series 2 image   26), previously 2.3 x 3.6 cm.  -Subcarinal conglomerate of lymph nodes measures 1.6 x 3.6 cm (series 2   image 34), previously 2.1 x 3.6 cm.  -A right hilar lymph node measures 1.1 x 1.3 cm (series 2 image 37),   previously 1.5 x 1.5 cm.  CHEST WALL AND LOWER NECK: Significant supraclavicular lymphadenopathy   with a reference nodes described below:  -Right supraclavicular node causing mass effect on the right jugular vein   measures 2.5 x 2.5 cm (series 2 image 4), previously 2.3 x 2.5 cm.  -A right upper paratracheal node measures 0.8 x 0.7 cm (series 2 image   12), previously 1.3 x 1.4 cm.    ABDOMEN AND PELVIS:    LIVER: Within normal limits.  BILE DUCTS: Normal caliber.  GALLBLADDER: Status post cholecystectomy.  SPLEEN: Within normal limits.  PANCREAS: Within normal limits.  ADRENALS: Indeterminate 1.1 cm right adrenal nodule (series 2 image 67),   not significantly changed since the prior exam 3/18/2019. Left adrenal   gland within normal limits.  KIDNEYS/URETERS: Bilateral renal cysts and hypoattenuating lesions that   are too small to characterize. No hydronephrosis..    BLADDER: Focal irregularity along the posterior wall of the urinary   bladder (2, 139), similar in appearance to the prior exam 3/18/2019.  REPRODUCTIVE ORGANS: Normal sized prostate containing coarse   calcifications.    BOWEL: No bowel obstruction. Normal appendix.  PERITONEUM: No ascites.  VESSELS:  Normal caliber abdominal aorta with atherosclerotic disease.  RETROPERITONEUM: No lymphadenopathy.    ABDOMINAL WALL: Within normal limits.  BONES: Multilevel degenerative changes of the spine.    IMPRESSION:     No significant change in size of the right upper lobe mass with   additional scattered pulmonary nodules and enlarged mediastinal and hilar   lymph nodes consistent with patient's known history of metastatic lung   cancer. A right upper paratracheal node is decreased in size.    Indeterminate 1.1 cm right adrenal nodule is unchanged and suspicious for   metastatic disease.    Focal irregularity along the posterior wall of the urinary bladder;   underlying neoplasm is not excluded. Correlate with urinalysis and direct   visualization as indicated.                   ALIN TREVIZO M.D., RADIOLOGY RESIDENT  This document has been electronically signed.  LEOLA GUERRERO M.D., ATTENDING RADIOLOGIST   This document has been electronically signed. May 13 2019  8:54AM                < end of copied text > MD REBOLLEDO  [id number]    HISTORY OF PRESENT ILLNESS:  73M hx of Metastatic NSCLC, Former tobacco use, T2DM, Rheumatoid Arthritis, Essential Hypertension presents to [facility] ED from [facility] for Acute on Chronic weakness. As per patient, he has had LUE/LLE weakness since his diagnosis of lung cancer in March of this year. However lately in the last week it has been progressively more difficult for him to stand, walk or transfer from bed to chair because his lower legs are now both weak. Yesterday he had an appointment at Dr. Waterman's office and needed to be carried by his daughter and son in law to clinic. Because of the progressive weakness there was concern for cord compression, and patient was sent to the ED for evaluation.     In the ED patient underwent MRI of T-spine and L-spine. Also got Rocephin 1g IV x 1, and Percocet. Patient seen at bedside. Weakness he feels is getting worse, and complains of intermittent back pain. No fevers or chills       Lung ca hx:  Mr Rebolledo is a 73m with significant smoking history who presents for evaluation of advanced NSCLC.   He presented to [facility]/18/19 after having LUE and LLE weakness, A CT head showed multifocal regions of vasogenic/perilesional edema, concerning for metastatic disease. MRI done 3/18/19 showed several enhancing lesions consistent with diffuse cerebral metastatic lesions.  A CT chest on 3/19/19 revealed 1 3.9 cm right upper lobe mass, in addition to right mediastinal lymphadenopathy.  Right neck mass biopsy on 3/25/19 at [facility] revealed metastatic pulmonary carcinoma, micropapillary type. PDL1 5%, KRAS mutated.  He was evaluated by radiation oncology Dr Estrada and By neurosurgery Dr Ferreira and will be undergoing GK.     Mr Rebolledo spends most his time at home watching TV because of his left sided weakness.   He lives with his daughter and has good social support.     See in April by Dr. Waterman. Not a good candidate for immuntheraphy as pt w active RA. Sent for GK radiation.   Was on dexamethasone 1mg BID and on , dexatmethasone 1mg daily     PAST MEDICAL & SURGICAL HISTORY:  Non-small cell lung cancer  Hyperlipidemia  Rheumatoid arthritis  Essential hypertension  Type 2 diabetes mellitus  S/P cholecystectomy      Review of Systems:  Gen:  No fevers/chills, weight loss  HEENT: No blurry vision, no difficulty swallowing  CVS: No chest pain/palpitations  Resp: No SOB/wheezing  GI: No N/V/C/D/abdominal pain  MSK:  Skin: No new rashes  Neuro: No headaches    MEDICATIONS  (STANDING):  aspirin enteric coated 81 milliGRAM(s) Oral daily  buDESOnide  80 MICROgram(s)/formoterol 4.5 MICROgram(s) Inhaler 2 Puff(s) Inhalation two times a day  dexamethasone  Injectable 4 milliGRAM(s) IV Push every 8 hours  gabapentin 200 milliGRAM(s) Oral at bedtime  hydroxychloroquine 200 milliGRAM(s) Oral two times a day  insulin glargine Injectable (LANTUS) 10 Unit(s) SubCutaneous two times a day  levETIRAcetam 500 milliGRAM(s) Oral two times a day  losartan 25 milliGRAM(s) Oral daily  simvastatin 10 milliGRAM(s) Oral at bedtime  tamsulosin 0.4 milliGRAM(s) Oral at bedtime  tiotropium 18 MICROgram(s) Capsule 1 Capsule(s) Inhalation daily    MEDICATIONS  (PRN):  ALBUTerol    90 MICROgram(s) HFA Inhaler 2 Puff(s) Inhalation every 6 hours PRN Shortness of Breath and/or Wheezing      Allergies    No Known Allergies    Intolerances        PERTINENT MEDICATION HISTORY:      · 	gabapentin 100 mg oral capsule: 2 cap(s) orally once a day (at bedtime), Last Dose Taken:    · 	methotrexate 2.5 mg oral tablet: 6 tab(s) orally once a week on Friday , Last Dose Taken:    · 	hydroxychloroquine 200 mg oral tablet: 1 tab(s) orally 2 times a day, Last Dose Taken:    · 	zafirlukast 10 mg oral tablet: 1 tab(s) orally 2 times a day, Last Dose Taken:    · 	folic acid 1 mg oral tablet: 1 tab(s) orally once a day, Last Dose Taken:    · 	dexamethasone 2 mg oral tablet: 1 tab(s) orally 2 times a day, Last Dose Taken:          SOCIAL HISTORY:  No alcohol or drug use. Former tobacco use    FAMILY HISTORY:  FH: leukemia  FH: lung cancer      Vital Signs Last 24 Hrs  T(C): 37.2 (16 May 2019 10:36), Max: 37.2 (16 May 2019 10:36)  T(F): 99 (16 May 2019 10:36), Max: 99 (16 May 2019 10:36)  HR: 84 (16 May 2019 10:36) (62 - 84)  BP: 151/74 (16 May 2019 10:36) (109/60 - 151/74)  BP(mean): --  RR: 18 (16 May 2019 10:36) (16 - 22)  SpO2: 97% (16 May 2019 10:36) (92% - 100%)    Daily     Daily     Physical Exam:  General: No apparent distress  HEENT: EOMI, MMM  CVS: +S1/S2, RRR  Resp: CTA b/l  GI: Soft, NT/ND  MSK:    Neuro: AAOx3  muscle strength RUE LUE ; RLE LLE   Skin: no  rashes    LABS:                        14.6   6.03  )-----------( 65       ( 15 May 2019 16:50 )             46.7     05-15    138  |  101  |  24<H>  ----------------------------<  244<H>  3.8   |  25  |  0.91    Ca    9.5      15 May 2019 16:50    TPro  6.9  /  Alb  3.7  /  TBili  0.5  /  DBili  x   /  AST  12  /  ALT  23  /  AlkPhos  71  05-15    PT/INR - ( 15 May 2019 18:10 )   PT: 12.4 SEC;   INR: 1.08          PTT - ( 15 May 2019 18:10 )  PTT:30.2 SEC  Urinalysis Basic - ( 15 May 2019 18:10 )    Color: YELLOW / Appearance: CLEAR / S.019 / pH: 6.0  Gluc: 300 / Ketone: NEGATIVE  / Bili: NEGATIVE / Urobili: NORMAL   Blood: SMALL / Protein: 30 / Nitrite: NEGATIVE   Leuk Esterase: MODERATE / RBC: 3-5 / WBC 11-25   Sq Epi: OCC / Non Sq Epi: x / Bacteria: NEGATIVE        RADIOLOGY & ADDITIONAL STUDIES:    < from: MR Thoracic Spine w/wo IV Cont (05.15.19 @ 21:07) >  EXAM:  MR SPINE LUMBAR WAW IC      EXAM:  MR SPINE THORACIC WAW IC        PROCEDURE DATE:  May 15 2019         INTERPRETATION:  CLINICAL INFORMATION: Metastatic lung cancer. Lower   extremity weakness.    TECHNIQUE: Contrast enhanced MR of the lumbar spine was performed.   Sagittal and axial T1 and T2-weighted images through the lumbar spine   were obtained. Sagittal T2, T1, STIR, T1 postcontrast axial T1, T2, and   T1 postcontrast images were obtained. 7.5 cc of Gadavist were   administered with 0 cc discarded.    COMPARISON: CT abdomen/pelvis 2019.    FINDINGS:    THORACIC SPINE:    The thoracic cord is normal in signal.    Vertebral body heights are maintained. Signal arising from bone is normal.    The normal thoracic lordosis is maintained.    Multilevel disc desiccation, most prominently involving T11-T12.    There is an asymmetric central disc bulge with posterior osseous ridging   at C6-C7 that causes minimal spinal canal narrowing. Since this is seen   at the edge of the film dedicated imaging of the cervical spine with MRI   can be done for further evaluation. Otherwise, no significant   neuroforaminal or thoracic spinal canal stenosis.    There is an incompletely visualized enhancing mass in the right upper   lobe, better characterized on recent CT chest from 2019. Partially   demonstrated lymph node involving the right neck region is again seen   compressing the adjacent internal jugular vein. This finding was   demonstrated on prior CT scan chest. Mild symmetric atrophy of the   paraspinal musculature, likely related to chronic disuse.    LUMBAR SPINE:    The distal cord and conus are normal in signal and morphology,   terminating at around the level of L1.    Vertebral body heights are maintained. Signal arising from bone is normal.    The normal lumbar lordosis is maintained. Grade 1 anterolisthesis of L4   on L5.    Multilevel disc desiccation without significant height loss.    Examination of the neuroforamina and spinal canal are as follows:    L1/L2: No neuroforaminal or spinal stenosis.  L2/L3: There is a mild diffuse disc bulge without neural foraminal or   spinal stenosis.  L3/L4: No neuroforaminal or spinal stenosis.  L4/L5: A diffuse central disc bulge superimposed with facet arthrosisand   ligamentum flavum hypertrophy cause mild bilateral foraminal stenosis. No   spinal stenosis.  L5/S1: No neuroforaminal or spinal stenosis. Clinical indication: Lower   extremity weakness.        Visualized portions of the SI joints are unremarkable. Mild symmetric   atrophy of the paraspinal musculature, likely related to chronic disuse.    Small bilateral renal cysts. Visualized intrapelvic contents are   otherwise unremarkable.    IMPRESSION:     No evidence of thoracic or lumbar spinal cord compression or osseous   metastatic disease.    Incompletely visualized right upper lobe mass, better characterized on   recent CT of the chest.              RIAZ PERKINS M.D., RADIOLOGY RESIDENT  This document has been electronically signed.  CHANELLE GARCIA M.D., ATTENDING RADIOLOGIST  This document has been electronically signed. May 16 2019 10:55AM                  < end of copied text >      < from: CT Head No Cont (05.15.19 @ 22:46) >  EXAM:  CT BRAIN        PROCEDURE DATE:  May 15 2019         INTERPRETATION:  NONCONTRAST CT OF THE BRAIN DATED 5/15/2019.    CLINICAL HISTORY: Left lower extremity neuro deficit.    TECHNIQUE: Axial CT images are obtained from the cranial vertex tothe   skull base without the administration of IV contrast. Coronal and   sagittal reformats were obtained.    COMPARISON: CT brain dated 3/17/2019. MRI dated 3/18/2019 and 2019    FINDINGS: There is no obvious acute intracranial hemorrhage or midline   shift. Again noted are scattered metastatic lesions, for example, in the   high right medial frontal region and anterior to the right glen, with   associated vasogenic edema in the high right frontoparietal > left   superior frontal and left anterior inferior frontal region. Nonspecific   mild periventricular and subcortical white matter lucencies may be in   part related to chronic microvessel ischemic changes. There is stable   mild cerebral volume loss with commensurate ventricular dilatation.    There is no displaced skull fracture. There is minimal mucosal thickening   of the sinuses. The right tympanomastoid region is unremarkable. Again   noted is left mastoid opacification.    IMPRESSION:    No acute intracranial hemorrhage. Known intracranial metastasis with   associated vasogenic edema as described. Follow-up contrast-enhanced MRI   would be helpful to assess interval change.              COLUMBA JESUS M.D., RADIOLOGY RESIDENT  This document has been electronically signed.  DENITA MENDOZA M.D., ATTENDING RADIOLOGIST  This document has been electronically signed. 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    COMPARISON: CT of the chest, abdomen, and pelvis from outside hospital   dated 3/18/2019.    PROCEDURE:   CT of the Chest, Abdomen and Pelvis was performed with intravenous   contrast.   Intravenous contrast: 90 ml Omnipaque 350. 10 ml discarded.  Oral contrast: Positive contrast was administered.  Sagittal and coronal reformats were performed.    FINDINGS:    CHEST:     LUNGS AND LARGE AIRWAYS: Patent central airways. Ill-defined right upper   lobe mass measures 3.0 x 3.5 cm (series 2 image 26), previously 3.3 x 3.9   cm. Scattered subcentimeter bilateral pulmonary nodules are noted with a   reference nodule measuring 4 mm in the left upper lobe (2, 19),   previously 5 mm. A 5 mm left in the lower lobe nodule (2, 56) is   unchanged since 3/18/2019. Right middle lobe atelectasis.  PLEURA: Trace right pleural effusion.  VESSELS: Atheromatous disease of the aorta. Coronary artery   calcifications.  HEART: Heart size is normal. No pericardial effusion.  MEDIASTINUM AND JASPAL: Extensive mediastinal and hilar lymphadenopathy.  -A right lower paratracheal node measures 2.3 x 3.3 cm (series 2 image   26), previously 2.3 x 3.6 cm.  -Subcarinal conglomerate of lymph nodes measures 1.6 x 3.6 cm (series 2   image 34), previously 2.1 x 3.6 cm.  -A right hilar lymph node measures 1.1 x 1.3 cm (series 2 image 37),   previously 1.5 x 1.5 cm.  CHEST WALL AND LOWER NECK: Significant supraclavicular lymphadenopathy   with a reference nodes described below:  -Right supraclavicular node causing mass effect on the right jugular vein   measures 2.5 x 2.5 cm (series 2 image 4), previously 2.3 x 2.5 cm.  -A right upper paratracheal node measures 0.8 x 0.7 cm (series 2 image   12), previously 1.3 x 1.4 cm.    ABDOMEN AND PELVIS:    LIVER: Within normal limits.  BILE DUCTS: Normal caliber.  GALLBLADDER: Status post cholecystectomy.  SPLEEN: Within normal limits.  PANCREAS: Within normal limits.  ADRENALS: Indeterminate 1.1 cm right adrenal nodule (series 2 image 67),   not significantly changed since the prior exam 3/18/2019. Left adrenal   gland within normal limits.  KIDNEYS/URETERS: Bilateral renal cysts and hypoattenuating lesions that   are too small to characterize. No hydronephrosis..    BLADDER: Focal irregularity along the posterior wall of the urinary   bladder (2, 139), similar in appearance to the prior exam 3/18/2019.  REPRODUCTIVE ORGANS: Normal sized prostate containing coarse   calcifications.    BOWEL: No bowel obstruction. Normal appendix.  PERITONEUM: No ascites.  VESSELS:  Normal caliber abdominal aorta with atherosclerotic disease.  RETROPERITONEUM: No lymphadenopathy.    ABDOMINAL WALL: Within normal limits.  BONES: Multilevel degenerative changes of the spine.    IMPRESSION:     No significant change in size of the right upper lobe mass with   additional scattered pulmonary nodules and enlarged mediastinal and hilar   lymph nodes consistent with patient's known history of metastatic lung   cancer. A right upper paratracheal node is decreased in size.    Indeterminate 1.1 cm right adrenal nodule is unchanged and suspicious for   metastatic disease.    Focal irregularity along the posterior wall of the urinary bladder;   underlying neoplasm is not excluded. Correlate with urinalysis and direct   visualization as indicated.                   ALIN TREVIZO M.D., RADIOLOGY RESIDENT  This document has been electronically signed.  LEOLA GUERRERO M.D., ATTENDING RADIOLOGIST   This document has been electronically signed. May 13 2019  8:54AM                < end of copied text > MD REBOLLEDO  [id number]    HISTORY OF PRESENT ILLNESS:  73M hx of Metastatic NSCLC, RA, to [facility] ED from [facility] for acute on chronic weakness. As per patient, he has had LUE/LLE weakness since his diagnosis of lung cancer in March of this year. However lately in the last week it has been progressively more difficult for him to stand, walk or transfer from bed to chair because his lower legs are now both weak. Yesterday he had an appointment at Dr. Waterman's office and needed to be carried by his daughter and son in law to clinic. Because of the progressive weakness there was concern for cord compression, and patient was sent to the ED for evaluation.       RA hx:  Dx 10 years ago in [facility] w pain, swelling of fingers, wrist, knees, ankles, feet. Pt was put on MTX, plaquenil, and initially steroids w good response. He had good control of his symptoms. 3 years ago started following w Dr. Francisca hart hx:  Mr Rebolledo is a 73m with significant smoking history who presents for evaluation of advanced NSCLC.   He presented to [facility] on 3/18/19 after having LUE and LLE weakness, A CT head showed multifocal regions of vasogenic/perilesional edema, concerning for metastatic disease. MRI done 3/18/19 showed several enhancing lesions consistent with diffuse cerebral metastatic lesions.  A CT chest on 3/19/19 revealed 1 3.9 cm right upper lobe mass, in addition to right mediastinal lymphadenopathy.  Right neck mass biopsy on 3/25/19 at [facility] revealed metastatic pulmonary carcinoma, micropapillary type. PDL1 5%, KRAS mutated.  He was evaluated by radiation oncology Dr Estrada and By neurosurgery Dr Ferreira and will be undergoing GK.     Mr Rebolledo spends most his time at home watching TV because of his left sided weakness.   He lives with his daughter and has good social support.     See in April by Dr. Waterman. Not a good candidate for immuntheraphy as pt w active RA. Sent for GK radiation.   Was on dexamethasone 1mg BID and on , dexatmethasone 1mg daily     PAST MEDICAL & SURGICAL HISTORY:  Non-small cell lung cancer  Hyperlipidemia  Rheumatoid arthritis  Essential hypertension  Type 2 diabetes mellitus  S/P cholecystectomy      Review of Systems:  Gen:  No fevers/chills, weight loss  HEENT: No blurry vision, no difficulty swallowing  CVS: No chest pain/palpitations  Resp: No SOB/wheezing  GI: No N/V/C/D/abdominal pain  MSK:  Skin: No new rashes  Neuro: No headaches    MEDICATIONS  (STANDING):  aspirin enteric coated 81 milliGRAM(s) Oral daily  buDESOnide  80 MICROgram(s)/formoterol 4.5 MICROgram(s) Inhaler 2 Puff(s) Inhalation two times a day  dexamethasone  Injectable 4 milliGRAM(s) IV Push every 8 hours  gabapentin 200 milliGRAM(s) Oral at bedtime  hydroxychloroquine 200 milliGRAM(s) Oral two times a day  insulin glargine Injectable (LANTUS) 10 Unit(s) SubCutaneous two times a day  levETIRAcetam 500 milliGRAM(s) Oral two times a day  losartan 25 milliGRAM(s) Oral daily  simvastatin 10 milliGRAM(s) Oral at bedtime  tamsulosin 0.4 milliGRAM(s) Oral at bedtime  tiotropium 18 MICROgram(s) Capsule 1 Capsule(s) Inhalation daily    MEDICATIONS  (PRN):  ALBUTerol    90 MICROgram(s) HFA Inhaler 2 Puff(s) Inhalation every 6 hours PRN Shortness of Breath and/or Wheezing      Allergies    No Known Allergies    Intolerances        PERTINENT MEDICATION HISTORY:      · 	gabapentin 100 mg oral capsule: 2 cap(s) orally once a day (at bedtime), Last Dose Taken:    · 	methotrexate 2.5 mg oral tablet: 6 tab(s) orally once a week on Friday , Last Dose Taken:    · 	hydroxychloroquine 200 mg oral tablet: 1 tab(s) orally 2 times a day, Last Dose Taken:    · 	zafirlukast 10 mg oral tablet: 1 tab(s) orally 2 times a day, Last Dose Taken:    · 	folic acid 1 mg oral tablet: 1 tab(s) orally once a day, Last Dose Taken:    · 	dexamethasone 2 mg oral tablet: 1 tab(s) orally 2 times a day, Last Dose Taken:          SOCIAL HISTORY:  No alcohol or drug use. Former tobacco use    FAMILY HISTORY:  FH: leukemia  FH: lung cancer      Vital Signs Last 24 Hrs  T(C): 37.2 (16 May 2019 10:36), Max: 37.2 (16 May 2019 10:36)  T(F): 99 (16 May 2019 10:36), Max: 99 (16 May 2019 10:36)  HR: 84 (16 May 2019 10:36) (62 - 84)  BP: 151/74 (16 May 2019 10:36) (109/60 - 151/74)  BP(mean): --  RR: 18 (16 May 2019 10:36) (16 - 22)  SpO2: 97% (16 May 2019 10:36) (92% - 100%)    Daily     Daily     Physical Exam:  General: No apparent distress  HEENT: EOMI, MMM  CVS: +S1/S2, RRR  Resp: CTA b/l  GI: Soft, NT/ND  MSK:    Neuro: AAOx3  muscle strength RUE LUE ; RLE LLE   Skin: no  rashes    LABS:                        14.6   6.03  )-----------( 65       ( 15 May 2019 16:50 )             46.7     05-15    138  |  101  |  24<H>  ----------------------------<  244<H>  3.8   |  25  |  0.91    Ca    9.5      15 May 2019 16:50    TPro  6.9  /  Alb  3.7  /  TBili  0.5  /  DBili  x   /  AST  12  /  ALT  23  /  AlkPhos  71  05-15    PT/INR - ( 15 May 2019 18:10 )   PT: 12.4 SEC;   INR: 1.08          PTT - ( 15 May 2019 18:10 )  PTT:30.2 SEC  Urinalysis Basic - ( 15 May 2019 18:10 )    Color: YELLOW / Appearance: CLEAR / S.019 / pH: 6.0  Gluc: 300 / Ketone: NEGATIVE  / Bili: NEGATIVE / Urobili: NORMAL   Blood: SMALL / Protein: 30 / Nitrite: NEGATIVE   Leuk Esterase: MODERATE / RBC: 3-5 / WBC 11-25   Sq Epi: OCC / Non Sq Epi: x / Bacteria: NEGATIVE        RADIOLOGY & ADDITIONAL STUDIES:    < from: MR Thoracic Spine w/wo IV Cont (05.15.19 @ 21:07) >  EXAM:  MR SPINE LUMBAR WAW IC      EXAM:  MR SPINE THORACIC WAW IC        PROCEDURE DATE:  May 15 2019         INTERPRETATION:  CLINICAL INFORMATION: Metastatic lung cancer. Lower   extremity weakness.    TECHNIQUE: Contrast enhanced MR of the lumbar spine was performed.   Sagittal and axial T1 and T2-weighted images through the lumbar spine   were obtained. Sagittal T2, T1, STIR, T1 postcontrast axial T1, T2, and   T1 postcontrast images were obtained. 7.5 cc of Gadavist were   administered with 0 cc discarded.    COMPARISON: CT abdomen/pelvis 2019.    FINDINGS:    THORACIC SPINE:    The thoracic cord is normal in signal.    Vertebral body heights are maintained. Signal arising from bone is normal.    The normal thoracic lordosis is maintained.    Multilevel disc desiccation, most prominently involving T11-T12.    There is an asymmetric central disc bulge with posterior osseous ridging   at C6-C7 that causes minimal spinal canal narrowing. Since this is seen   at the edge of the film dedicated imaging of the cervical spine with MRI   can be done for further evaluation. Otherwise, no significant   neuroforaminal or thoracic spinal canal stenosis.    There is an incompletely visualized enhancing mass in the right upper   lobe, better characterized on recent CT chest from 2019. Partially   demonstrated lymph node involving the right neck region is again seen   compressing the adjacent internal jugular vein. This finding was   demonstrated on prior CT scan chest. Mild symmetric atrophy of the   paraspinal musculature, likely related to chronic disuse.    LUMBAR SPINE:    The distal cord and conus are normal in signal and morphology,   terminating at around the level of L1.    Vertebral body heights are maintained. Signal arising from bone is normal.    The normal lumbar lordosis is maintained. Grade 1 anterolisthesis of L4   on L5.    Multilevel disc desiccation without significant height loss.    Examination of the neuroforamina and spinal canal are as follows:    L1/L2: No neuroforaminal or spinal stenosis.  L2/L3: There is a mild diffuse disc bulge without neural foraminal or   spinal stenosis.  L3/L4: No neuroforaminal or spinal stenosis.  L4/L5: A diffuse central disc bulge superimposed with facet arthrosisand   ligamentum flavum hypertrophy cause mild bilateral foraminal stenosis. No   spinal stenosis.  L5/S1: No neuroforaminal or spinal stenosis. Clinical indication: Lower   extremity weakness.        Visualized portions of the SI joints are unremarkable. Mild symmetric   atrophy of the paraspinal musculature, likely related to chronic disuse.    Small bilateral renal cysts. Visualized intrapelvic contents are   otherwise unremarkable.    IMPRESSION:     No evidence of thoracic or lumbar spinal cord compression or osseous   metastatic disease.    Incompletely visualized right upper lobe mass, better characterized on   recent CT of the chest.              RIAZ PERKINS M.D., RADIOLOGY RESIDENT  This document has been electronically signed.  CHANELLE GARCIA M.D., ATTENDING RADIOLOGIST  This document has been electronically signed. May 16 2019 10:55AM                  < end of copied text >      < from: CT Head No Cont (05.15.19 @ 22:46) >  EXAM:  CT BRAIN        PROCEDURE DATE:  May 15 2019         INTERPRETATION:  NONCONTRAST CT OF THE BRAIN DATED 5/15/2019.    CLINICAL HISTORY: Left lower extremity neuro deficit.    TECHNIQUE: Axial CT images are obtained from the cranial vertex tothe   skull base without the administration of IV contrast. Coronal and   sagittal reformats were obtained.    COMPARISON: CT brain dated 3/17/2019. MRI dated 3/18/2019 and 2019    FINDINGS: There is no obvious acute intracranial hemorrhage or midline   shift. Again noted are scattered metastatic lesions, for example, in the   high right medial frontal region and anterior to the right glen, with   associated vasogenic edema in the high right frontoparietal > left   superior frontal and left anterior inferior frontal region. Nonspecific   mild periventricular and subcortical white matter lucencies may be in   part related to chronic microvessel ischemic changes. There is stable   mild cerebral volume loss with commensurate ventricular dilatation.    There is no displaced skull fracture. There is minimal mucosal thickening   of the sinuses. The right tympanomastoid region is unremarkable. Again   noted is left mastoid opacification.    IMPRESSION:    No acute intracranial hemorrhage. Known intracranial metastasis with   associated vasogenic edema as described. Follow-up contrast-enhanced MRI   would be helpful to assess interval change.              COLUMBA JESUS M.D., RADIOLOGY RESIDENT  This document has been electronically signed.  DENITA MENDOZA M.D., ATTENDING RADIOLOGIST  This document has been electronically signed. 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    COMPARISON: CT of the chest, abdomen, and pelvis from outside hospital   dated 3/18/2019.    PROCEDURE:   CT of the Chest, Abdomen and Pelvis was performed with intravenous   contrast.   Intravenous contrast: 90 ml Omnipaque 350. 10 ml discarded.  Oral contrast: Positive contrast was administered.  Sagittal and coronal reformats were performed.    FINDINGS:    CHEST:     LUNGS AND LARGE AIRWAYS: Patent central airways. Ill-defined right upper   lobe mass measures 3.0 x 3.5 cm (series 2 image 26), previously 3.3 x 3.9   cm. Scattered subcentimeter bilateral pulmonary nodules are noted with a   reference nodule measuring 4 mm in the left upper lobe (2, 19),   previously 5 mm. A 5 mm left in the lower lobe nodule (2, 56) is   unchanged since 3/18/2019. Right middle lobe atelectasis.  PLEURA: Trace right pleural effusion.  VESSELS: Atheromatous disease of the aorta. Coronary artery   calcifications.  HEART: Heart size is normal. No pericardial effusion.  MEDIASTINUM AND JASPAL: Extensive mediastinal and hilar lymphadenopathy.  -A right lower paratracheal node measures 2.3 x 3.3 cm (series 2 image   26), previously 2.3 x 3.6 cm.  -Subcarinal conglomerate of lymph nodes measures 1.6 x 3.6 cm (series 2   image 34), previously 2.1 x 3.6 cm.  -A right hilar lymph node measures 1.1 x 1.3 cm (series 2 image 37),   previously 1.5 x 1.5 cm.  CHEST WALL AND LOWER NECK: Significant supraclavicular lymphadenopathy   with a reference nodes described below:  -Right supraclavicular node causing mass effect on the right jugular vein   measures 2.5 x 2.5 cm (series 2 image 4), previously 2.3 x 2.5 cm.  -A right upper paratracheal node measures 0.8 x 0.7 cm (series 2 image   12), previously 1.3 x 1.4 cm.    ABDOMEN AND PELVIS:    LIVER: Within normal limits.  BILE DUCTS: Normal caliber.  GALLBLADDER: Status post cholecystectomy.  SPLEEN: Within normal limits.  PANCREAS: Within normal limits.  ADRENALS: Indeterminate 1.1 cm right adrenal nodule (series 2 image 67),   not significantly changed since the prior exam 3/18/2019. Left adrenal   gland within normal limits.  KIDNEYS/URETERS: Bilateral renal cysts and hypoattenuating lesions that   are too small to characterize. No hydronephrosis..    BLADDER: Focal irregularity along the posterior wall of the urinary   bladder (2, 139), similar in appearance to the prior exam 3/18/2019.  REPRODUCTIVE ORGANS: Normal sized prostate containing coarse   calcifications.    BOWEL: No bowel obstruction. Normal appendix.  PERITONEUM: No ascites.  VESSELS:  Normal caliber abdominal aorta with atherosclerotic disease.  RETROPERITONEUM: No lymphadenopathy.    ABDOMINAL WALL: Within normal limits.  BONES: Multilevel degenerative changes of the spine.    IMPRESSION:     No significant change in size of the right upper lobe mass with   additional scattered pulmonary nodules and enlarged mediastinal and hilar   lymph nodes consistent with patient's known history of metastatic lung   cancer. A right upper paratracheal node is decreased in size.    Indeterminate 1.1 cm right adrenal nodule is unchanged and suspicious for   metastatic disease.    Focal irregularity along the posterior wall of the urinary bladder;   underlying neoplasm is not excluded. Correlate with urinalysis and direct   visualization as indicated.                   ALIN TREVIZO M.D., RADIOLOGY RESIDENT  This document has been electronically signed.  LEOLA GUERRERO M.D., ATTENDING RADIOLOGIST   This document has been electronically signed. May 13 2019  8:54AM                < end of copied text > MD MENSAH  [id number]    HISTORY OF PRESENT ILLNESS:  73M hx of Metastatic NSCLC, RA, to [facility] ED from [facility] for acute on chronic weakness. As per patient, he has had LUE/LLE weakness since his diagnosis of lung cancer in March of this year. However lately in the last week it has been progressively more difficult for him to stand, walk or transfer from bed to chair because his lower legs are now both weak. Yesterday he had an appointment at Dr. Waterman's office and needed to be carried by his daughter and son in law to clinic. Because of the progressive weakness there was concern for cord compression, and patient was sent to the ED for evaluation.     Denies any fevers, rashes, joint pain, swelling, abdominal pain,n/v/d. Chronic SOB.       RA hx:  Dx 10 years ago in [facility] w pain, swelling of fingers, wrist, knees, ankles, feet. Pt was put on MTX, plaquenil, and initially steroids w good response. He had good control of his symptoms. 3 years ago started following w Dr. Storey. Sees this rheumatologist once a year. Gets refills through primary. Pt says that his symptoms have been uncontrolled for years with this regimen. Pt has not required steroids for his RA for years. No extraarticular       Lung ca hx:   He presented to [facility] on 3/18/19 after having LUE and LLE weakness, A CT head showed multifocal regions of vasogenic/perilesional edema, concerning for metastatic disease. MRI done 3/18/19 showed several enhancing lesions consistent with diffuse cerebral metastatic lesions.  A CT chest on 3/19/19 revealed 1 3.9 cm right upper lobe mass, in addition to right mediastinal lymphadenopathy.  Right neck mass biopsy on 3/25/19 at [facility] revealed metastatic pulmonary carcinoma, micropapillary type. PDL1 5%, KRAS mutated.  Has been getting GK for his brain mets. Last received last Tuesday.     Has been on dexamethasone for his brain mets. Was on dexamethasone 1mg BID tapered down to 1g yesterday.     PAST MEDICAL & SURGICAL HISTORY:  Non-small cell lung cancer  Hyperlipidemia  Rheumatoid arthritis  Essential hypertension  Type 2 diabetes mellitus  S/P cholecystectomy      Review of Systems:  as per HPI     MEDICATIONS  (STANDING):  aspirin enteric coated 81 milliGRAM(s) Oral daily  buDESOnide  80 MICROgram(s)/formoterol 4.5 MICROgram(s) Inhaler 2 Puff(s) Inhalation two times a day  dexamethasone  Injectable 4 milliGRAM(s) IV Push every 8 hours  gabapentin 200 milliGRAM(s) Oral at bedtime  hydroxychloroquine 200 milliGRAM(s) Oral two times a day  insulin glargine Injectable (LANTUS) 10 Unit(s) SubCutaneous two times a day  levETIRAcetam 500 milliGRAM(s) Oral two times a day  losartan 25 milliGRAM(s) Oral daily  simvastatin 10 milliGRAM(s) Oral at bedtime  tamsulosin 0.4 milliGRAM(s) Oral at bedtime  tiotropium 18 MICROgram(s) Capsule 1 Capsule(s) Inhalation daily    MEDICATIONS  (PRN):  ALBUTerol    90 MICROgram(s) HFA Inhaler 2 Puff(s) Inhalation every 6 hours PRN Shortness of Breath and/or Wheezing      Allergies    No Known Allergies    Intolerances        PERTINENT MEDICATION HISTORY:      · 	gabapentin 100 mg oral capsule: 2 cap(s) orally once a day (at bedtime), Last Dose Taken:    · 	methotrexate 2.5 mg oral tablet: 6 tab(s) orally once a week on Friday , Last Dose Taken:    · 	hydroxychloroquine 200 mg oral tablet: 1 tab(s) orally 2 times a day, Last Dose Taken:    · 	zafirlukast 10 mg oral tablet: 1 tab(s) orally 2 times a day, Last Dose Taken:    · 	folic acid 1 mg oral tablet: 1 tab(s) orally once a day, Last Dose Taken:    · 	dexamethasone 2 mg oral tablet: 1 tab(s) orally 2 times a day, Last Dose Taken:          SOCIAL HISTORY:  No alcohol or drug use. Former tobacco use    FAMILY HISTORY:  FH: leukemia  FH: lung cancer      Vital Signs Last 24 Hrs  T(C): 37.2 (16 May 2019 10:36), Max: 37.2 (16 May 2019 10:36)  T(F): 99 (16 May 2019 10:36), Max: 99 (16 May 2019 10:36)  HR: 84 (16 May 2019 10:36) (62 - 84)  BP: 151/74 (16 May 2019 10:36) (109/60 - 151/74)  BP(mean): --  RR: 18 (16 May 2019 10:36) (16 - 22)  SpO2: 97% (16 May 2019 10:36) (92% - 100%)    Daily     Daily     Physical Exam:  General: obese  HEENT: no oral ulcers. Trush on mouth.   CVS: +S1/S2, no w,c   Resp: low air flow   GI: Soft, NT/ND  MSK: no synovitis   Neuro: AAOx3  muscle strength: UE 5/5,   Skin: no  rashes    LABS:                        14.6   6.03  )-----------( 65       ( 15 May 2019 16:50 )             46.7     05-15    138  |  101  |  24<H>  ----------------------------<  244<H>  3.8   |  25  |  0.91    Ca    9.5      15 May 2019 16:50    TPro  6.9  /  Alb  3.7  /  TBili  0.5  /  DBili  x   /  AST  12  /  ALT  23  /  AlkPhos  71  05-15    PT/INR - ( 15 May 2019 18:10 )   PT: 12.4 SEC;   INR: 1.08          PTT - ( 15 May 2019 18:10 )  PTT:30.2 SEC  Urinalysis Basic - ( 15 May 2019 18:10 )    Color: YELLOW / Appearance: CLEAR / S.019 / pH: 6.0  Gluc: 300 / Ketone: NEGATIVE  / Bili: NEGATIVE / Urobili: NORMAL   Blood: SMALL / Protein: 30 / Nitrite: NEGATIVE   Leuk Esterase: MODERATE / RBC: 3-5 / WBC 11-25   Sq Epi: OCC / Non Sq Epi: x / Bacteria: NEGATIVE        RADIOLOGY & ADDITIONAL STUDIES:    < from: MR Thoracic Spine w/wo IV Cont (05.15.19 @ 21:07) >  EXAM:  MR SPINE LUMBAR WAW IC      EXAM:  MR SPINE THORACIC WAW IC        PROCEDURE DATE:  May 15 2019         INTERPRETATION:  CLINICAL INFORMATION: Metastatic lung cancer. Lower   extremity weakness.    TECHNIQUE: Contrast enhanced MR of the lumbar spine was performed.   Sagittal and axial T1 and T2-weighted images through the lumbar spine   were obtained. Sagittal T2, T1, STIR, T1 postcontrast axial T1, T2, and   T1 postcontrast images were obtained. 7.5 cc of Gadavist were   administered with 0 cc discarded.    COMPARISON: CT abdomen/pelvis 2019.    FINDINGS:    THORACIC SPINE:    The thoracic cord is normal in signal.    Vertebral body heights are maintained. Signal arising from bone is normal.    The normal thoracic lordosis is maintained.    Multilevel disc desiccation, most prominently involving T11-T12.    There is an asymmetric central disc bulge with posterior osseous ridging   at C6-C7 that causes minimal spinal canal narrowing. Since this is seen   at the edge of the film dedicated imaging of the cervical spine with MRI   can be done for further evaluation. Otherwise, no significant   neuroforaminal or thoracic spinal canal stenosis.    There is an incompletely visualized enhancing mass in the right upper   lobe, better characterized on recent CT chest from 2019. Partially   demonstrated lymph node involving the right neck region is again seen   compressing the adjacent internal jugular vein. This finding was   demonstrated on prior CT scan chest. Mild symmetric atrophy of the   paraspinal musculature, likely related to chronic disuse.    LUMBAR SPINE:    The distal cord and conus are normal in signal and morphology,   terminating at around the level of L1.    Vertebral body heights are maintained. Signal arising from bone is normal.    The normal lumbar lordosis is maintained. Grade 1 anterolisthesis of L4   on L5.    Multilevel disc desiccation without significant height loss.    Examination of the neuroforamina and spinal canal are as follows:    L1/L2: No neuroforaminal or spinal stenosis.  L2/L3: There is a mild diffuse disc bulge without neural foraminal or   spinal stenosis.  L3/L4: No neuroforaminal or spinal stenosis.  L4/L5: A diffuse central disc bulge superimposed with facet arthrosisand   ligamentum flavum hypertrophy cause mild bilateral foraminal stenosis. No   spinal stenosis.  L5/S1: No neuroforaminal or spinal stenosis. Clinical indication: Lower   extremity weakness.        Visualized portions of the SI joints are unremarkable. Mild symmetric   atrophy of the paraspinal musculature, likely related to chronic disuse.    Small bilateral renal cysts. Visualized intrapelvic contents are   otherwise unremarkable.    IMPRESSION:     No evidence of thoracic or lumbar spinal cord compression or osseous   metastatic disease.    Incompletely visualized right upper lobe mass, better characterized on   recent CT of the chest.              RIAZ PERKINS M.D., RADIOLOGY RESIDENT  This document has been electronically signed.  CHANELLE GARCIA M.D., ATTENDING RADIOLOGIST  This document has been electronically signed. May 16 2019 10:55AM                  < end of copied text >      < from: CT Head No Cont (05.15.19 @ 22:46) >  EXAM:  CT BRAIN        PROCEDURE DATE:  May 15 2019         INTERPRETATION:  NONCONTRAST CT OF THE BRAIN DATED 5/15/2019.    CLINICAL HISTORY: Left lower extremity neuro deficit.    TECHNIQUE: Axial CT images are obtained from the cranial vertex tothe   skull base without the administration of IV contrast. Coronal and   sagittal reformats were obtained.    COMPARISON: CT brain dated 3/17/2019. MRI dated 3/18/2019 and 2019    FINDINGS: There is no obvious acute intracranial hemorrhage or midline   shift. Again noted are scattered metastatic lesions, for example, in the   high right medial frontal region and anterior to the right glen, with   associated vasogenic edema in the high right frontoparietal > left   superior frontal and left anterior inferior frontal region. Nonspecific   mild periventricular and subcortical white matter lucencies may be in   part related to chronic microvessel ischemic changes. There is stable   mild cerebral volume loss with commensurate ventricular dilatation.    There is no displaced skull fracture. There is minimal mucosal thickening   of the sinuses. The right tympanomastoid region is unremarkable. Again   noted is left mastoid opacification.    IMPRESSION:    No acute intracranial hemorrhage. Known intracranial metastasis with   associated vasogenic edema as described. Follow-up contrast-enhanced MRI   would be helpful to assess interval change.              COLUMBA JESUS M.D., RADIOLOGY RESIDENT  This document has been electronically signed.  DENITA MENDOZA M.D., ATTENDING RADIOLOGIST  This document has been electronically signed. 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    COMPARISON: CT of the chest, abdomen, and pelvis from outside hospital   dated 3/18/2019.    PROCEDURE:   CT of the Chest, Abdomen and Pelvis was performed with intravenous   contrast.   Intravenous contrast: 90 ml Omnipaque 350. 10 ml discarded.  Oral contrast: Positive contrast was administered.  Sagittal and coronal reformats were performed.    FINDINGS:    CHEST:     LUNGS AND LARGE AIRWAYS: Patent central airways. Ill-defined right upper   lobe mass measures 3.0 x 3.5 cm (series 2 image 26), previously 3.3 x 3.9   cm. Scattered subcentimeter bilateral pulmonary nodules are noted with a   reference nodule measuring 4 mm in the left upper lobe (2, 19),   previously 5 mm. A 5 mm left in the lower lobe nodule (2, 56) is   unchanged since 3/18/2019. Right middle lobe atelectasis.  PLEURA: Trace right pleural effusion.  VESSELS: Atheromatous disease of the aorta. Coronary artery   calcifications.  HEART: Heart size is normal. No pericardial effusion.  MEDIASTINUM AND JASPAL: Extensive mediastinal and hilar lymphadenopathy.  -A right lower paratracheal node measures 2.3 x 3.3 cm (series 2 image   26), previously 2.3 x 3.6 cm.  -Subcarinal conglomerate of lymph nodes measures 1.6 x 3.6 cm (series 2   image 34), previously 2.1 x 3.6 cm.  -A right hilar lymph node measures 1.1 x 1.3 cm (series 2 image 37),   previously 1.5 x 1.5 cm.  CHEST WALL AND LOWER NECK: Significant supraclavicular lymphadenopathy   with a reference nodes described below:  -Right supraclavicular node causing mass effect on the right jugular vein   measures 2.5 x 2.5 cm (series 2 image 4), previously 2.3 x 2.5 cm.  -A right upper paratracheal node measures 0.8 x 0.7 cm (series 2 image   12), previously 1.3 x 1.4 cm.    ABDOMEN AND PELVIS:    LIVER: Within normal limits.  BILE DUCTS: Normal caliber.  GALLBLADDER: Status post cholecystectomy.  SPLEEN: Within normal limits.  PANCREAS: Within normal limits.  ADRENALS: Indeterminate 1.1 cm right adrenal nodule (series 2 image 67),   not significantly changed since the prior exam 3/18/2019. Left adrenal   gland within normal limits.  KIDNEYS/URETERS: Bilateral renal cysts and hypoattenuating lesions that   are too small to characterize. No hydronephrosis..    BLADDER: Focal irregularity along the posterior wall of the urinary   bladder (2, 139), similar in appearance to the prior exam 3/18/2019.  REPRODUCTIVE ORGANS: Normal sized prostate containing coarse   calcifications.    BOWEL: No bowel obstruction. Normal appendix.  PERITONEUM: No ascites.  VESSELS:  Normal caliber abdominal aorta with atherosclerotic disease.  RETROPERITONEUM: No lymphadenopathy.    ABDOMINAL WALL: Within normal limits.  BONES: Multilevel degenerative changes of the spine.    IMPRESSION:     No significant change in size of the right upper lobe mass with   additional scattered pulmonary nodules and enlarged mediastinal and hilar   lymph nodes consistent with patient's known history of metastatic lung   cancer. A right upper paratracheal node is decreased in size.    Indeterminate 1.1 cm right adrenal nodule is unchanged and suspicious for   metastatic disease.    Focal irregularity along the posterior wall of the urinary bladder;   underlying neoplasm is not excluded. Correlate with urinalysis and direct   visualization as indicated.                   ALIN TREVIZO M.D., RADIOLOGY RESIDENT  This document has been electronically signed.  LEOLA GUERRERO M.D., ATTENDING RADIOLOGIST   This document has been electronically signed. May 13 2019  8:54AM                < end of copied text > MD MENSAH  [id number]    HISTORY OF PRESENT ILLNESS:  73M hx of Metastatic NSCLC, RA, to [facility] ED from [facility] for acute on chronic weakness. As per patient, he has had LUE/LLE weakness since his diagnosis of lung cancer in March of this year. However lately in the last week it has been progressively more difficult for him to stand, walk or transfer from bed to chair because his lower legs are now both weak. Yesterday he had an appointment at Dr. Waterman's office and needed to be carried by his daughter and son in law to clinic. Because of the progressive weakness there was concern for cord compression, and patient was sent to the ED for evaluation.     Denies any fevers, rashes, joint pain, swelling, abdominal pain,n/v/d. Chronic SOB.       RA hx:  Dx 10 years ago in [facility] w pain, swelling of fingers, wrist, knees, ankles, feet. Pt was put on MTX, plaquenil, and initially steroids w good response. He had good control of his symptoms. 3 years ago started following w Dr. Storey. Sees this rheumatologist once a year. Gets refills through primary. Pt says that his symptoms have been uncontrolled for years with this regimen. Pt has not required steroids for his RA for years. No extraarticular       Lung ca hx:   He presented to [facility] on 3/18/19 after having LUE and LLE weakness, A CT head showed multifocal regions of vasogenic/perilesional edema, concerning for metastatic disease. MRI done 3/18/19 showed several enhancing lesions consistent with diffuse cerebral metastatic lesions.  A CT chest on 3/19/19 revealed 1 3.9 cm right upper lobe mass, in addition to right mediastinal lymphadenopathy.  Right neck mass biopsy on 3/25/19 at [facility] revealed metastatic pulmonary carcinoma, micropapillary type. PDL1 5%, KRAS mutated.  Has been getting GK for his brain mets. Last received last Tuesday.     Has been on dexamethasone for his brain mets. Was on dexamethasone 1mg BID tapered down to 1g yesterday.     PAST MEDICAL & SURGICAL HISTORY:  Non-small cell lung cancer  Hyperlipidemia  Rheumatoid arthritis  Essential hypertension  Type 2 diabetes mellitus  S/P cholecystectomy      Review of Systems:  as per HPI     MEDICATIONS  (STANDING):  aspirin enteric coated 81 milliGRAM(s) Oral daily  buDESOnide  80 MICROgram(s)/formoterol 4.5 MICROgram(s) Inhaler 2 Puff(s) Inhalation two times a day  dexamethasone  Injectable 4 milliGRAM(s) IV Push every 8 hours  gabapentin 200 milliGRAM(s) Oral at bedtime  hydroxychloroquine 200 milliGRAM(s) Oral two times a day  insulin glargine Injectable (LANTUS) 10 Unit(s) SubCutaneous two times a day  levETIRAcetam 500 milliGRAM(s) Oral two times a day  losartan 25 milliGRAM(s) Oral daily  simvastatin 10 milliGRAM(s) Oral at bedtime  tamsulosin 0.4 milliGRAM(s) Oral at bedtime  tiotropium 18 MICROgram(s) Capsule 1 Capsule(s) Inhalation daily    MEDICATIONS  (PRN):  ALBUTerol    90 MICROgram(s) HFA Inhaler 2 Puff(s) Inhalation every 6 hours PRN Shortness of Breath and/or Wheezing      Allergies    No Known Allergies    Intolerances        PERTINENT MEDICATION HISTORY:      · 	gabapentin 100 mg oral capsule: 2 cap(s) orally once a day (at bedtime), Last Dose Taken:    · 	methotrexate 2.5 mg oral tablet: 6 tab(s) orally once a week on Friday , Last Dose Taken:    · 	hydroxychloroquine 200 mg oral tablet: 1 tab(s) orally 2 times a day, Last Dose Taken:    · 	zafirlukast 10 mg oral tablet: 1 tab(s) orally 2 times a day, Last Dose Taken:    · 	folic acid 1 mg oral tablet: 1 tab(s) orally once a day, Last Dose Taken:    · 	dexamethasone 2 mg oral tablet: 1 tab(s) orally 2 times a day, Last Dose Taken:          SOCIAL HISTORY:  No alcohol or drug use. Former tobacco use    FAMILY HISTORY:  FH: leukemia  FH: lung cancer      Vital Signs Last 24 Hrs  T(C): 37.2 (16 May 2019 10:36), Max: 37.2 (16 May 2019 10:36)  T(F): 99 (16 May 2019 10:36), Max: 99 (16 May 2019 10:36)  HR: 84 (16 May 2019 10:36) (62 - 84)  BP: 151/74 (16 May 2019 10:36) (109/60 - 151/74)  BP(mean): --  RR: 18 (16 May 2019 10:36) (16 - 22)  SpO2: 97% (16 May 2019 10:36) (92% - 100%)    Daily     Daily     Physical Exam:  General: obese  HEENT: no oral ulcers. Trush on mouth.   CVS: +S1/S2, no w,c   Resp: low air flow   GI: Soft, NT/ND  MSK: no synovitis   Neuro: AAOx3  muscle strength: R deltoid 5/5. L deltoid 3/5. b/l biceps/triceps, interosseous mm 5/5. L iliopsoas +2/5, R iliopsoas 3/5, quads/hamgstrings 4/5, plantarflex/dorsiflex 5/5    Skin: no  rashes    LABS:                        14.6   6.03  )-----------( 65       ( 15 May 2019 16:50 )             46.7     05-15    138  |  101  |  24<H>  ----------------------------<  244<H>  3.8   |  25  |  0.91    Ca    9.5      15 May 2019 16:50    TPro  6.9  /  Alb  3.7  /  TBili  0.5  /  DBili  x   /  AST  12  /  ALT  23  /  AlkPhos  71  05-15    PT/INR - ( 15 May 2019 18:10 )   PT: 12.4 SEC;   INR: 1.08          PTT - ( 15 May 2019 18:10 )  PTT:30.2 SEC  Urinalysis Basic - ( 15 May 2019 18:10 )    Color: YELLOW / Appearance: CLEAR / S.019 / pH: 6.0  Gluc: 300 / Ketone: NEGATIVE  / Bili: NEGATIVE / Urobili: NORMAL   Blood: SMALL / Protein: 30 / Nitrite: NEGATIVE   Leuk Esterase: MODERATE / RBC: 3-5 / WBC 11-25   Sq Epi: OCC / Non Sq Epi: x / Bacteria: NEGATIVE        RADIOLOGY & ADDITIONAL STUDIES:    < from: MR Thoracic Spine w/wo IV Cont (05.15.19 @ 21:07) >  EXAM:  MR SPINE LUMBAR WAW IC      EXAM:  MR SPINE THORACIC WAW IC        PROCEDURE DATE:  May 15 2019         INTERPRETATION:  CLINICAL INFORMATION: Metastatic lung cancer. Lower   extremity weakness.    TECHNIQUE: Contrast enhanced MR of the lumbar spine was performed.   Sagittal and axial T1 and T2-weighted images through the lumbar spine   were obtained. Sagittal T2, T1, STIR, T1 postcontrast axial T1, T2, and   T1 postcontrast images were obtained. 7.5 cc of Gadavist were   administered with 0 cc discarded.    COMPARISON: CT abdomen/pelvis 2019.    FINDINGS:    THORACIC SPINE:    The thoracic cord is normal in signal.    Vertebral body heights are maintained. Signal arising from bone is normal.    The normal thoracic lordosis is maintained.    Multilevel disc desiccation, most prominently involving T11-T12.    There is an asymmetric central disc bulge with posterior osseous ridging   at C6-C7 that causes minimal spinal canal narrowing. Since this is seen   at the edge of the film dedicated imaging of the cervical spine with MRI   can be done for further evaluation. Otherwise, no significant   neuroforaminal or thoracic spinal canal stenosis.    There is an incompletely visualized enhancing mass in the right upper   lobe, better characterized on recent CT chest from 2019. Partially   demonstrated lymph node involving the right neck region is again seen   compressing the adjacent internal jugular vein. This finding was   demonstrated on prior CT scan chest. Mild symmetric atrophy of the   paraspinal musculature, likely related to chronic disuse.    LUMBAR SPINE:    The distal cord and conus are normal in signal and morphology,   terminating at around the level of L1.    Vertebral body heights are maintained. Signal arising from bone is normal.    The normal lumbar lordosis is maintained. Grade 1 anterolisthesis of L4   on L5.    Multilevel disc desiccation without significant height loss.    Examination of the neuroforamina and spinal canal are as follows:    L1/L2: No neuroforaminal or spinal stenosis.  L2/L3: There is a mild diffuse disc bulge without neural foraminal or   spinal stenosis.  L3/L4: No neuroforaminal or spinal stenosis.  L4/L5: A diffuse central disc bulge superimposed with facet arthrosisand   ligamentum flavum hypertrophy cause mild bilateral foraminal stenosis. No   spinal stenosis.  L5/S1: No neuroforaminal or spinal stenosis. Clinical indication: Lower   extremity weakness.        Visualized portions of the SI joints are unremarkable. Mild symmetric   atrophy of the paraspinal musculature, likely related to chronic disuse.    Small bilateral renal cysts. Visualized intrapelvic contents are   otherwise unremarkable.    IMPRESSION:     No evidence of thoracic or lumbar spinal cord compression or osseous   metastatic disease.    Incompletely visualized right upper lobe mass, better characterized on   recent CT of the chest.              RIAZ PERKINS M.D., RADIOLOGY RESIDENT  This document has been electronically signed.  CHANELLE GARCIA M.D., ATTENDING RADIOLOGIST  This document has been electronically signed. May 16 2019 10:55AM                  < end of copied text >      < from: CT Head No Cont (05.15.19 @ 22:46) >  EXAM:  CT BRAIN        PROCEDURE DATE:  May 15 2019         INTERPRETATION:  NONCONTRAST CT OF THE BRAIN DATED 5/15/2019.    CLINICAL HISTORY: Left lower extremity neuro deficit.    TECHNIQUE: Axial CT images are obtained from the cranial vertex tothe   skull base without the administration of IV contrast. Coronal and   sagittal reformats were obtained.    COMPARISON: CT brain dated 3/17/2019. MRI dated 3/18/2019 and 2019    FINDINGS: There is no obvious acute intracranial hemorrhage or midline   shift. Again noted are scattered metastatic lesions, for example, in the   high right medial frontal region and anterior to the right glen, with   associated vasogenic edema in the high right frontoparietal > left   superior frontal and left anterior inferior frontal region. Nonspecific   mild periventricular and subcortical white matter lucencies may be in   part related to chronic microvessel ischemic changes. There is stable   mild cerebral volume loss with commensurate ventricular dilatation.    There is no displaced skull fracture. There is minimal mucosal thickening   of the sinuses. The right tympanomastoid region is unremarkable. Again   noted is left mastoid opacification.    IMPRESSION:    No acute intracranial hemorrhage. Known intracranial metastasis with   associated vasogenic edema as described. Follow-up contrast-enhanced MRI   would be helpful to assess interval change.              COLUMBA JESUS M.D., RADIOLOGY RESIDENT  This document has been electronically signed.  DENITA MENDOZA M.D., ATTENDING RADIOLOGIST  This document has been electronically signed. 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    COMPARISON: CT of the chest, abdomen, and pelvis from outside hospital   dated 3/18/2019.    PROCEDURE:   CT of the Chest, Abdomen and Pelvis was performed with intravenous   contrast.   Intravenous contrast: 90 ml Omnipaque 350. 10 ml discarded.  Oral contrast: Positive contrast was administered.  Sagittal and coronal reformats were performed.    FINDINGS:    CHEST:     LUNGS AND LARGE AIRWAYS: Patent central airways. Ill-defined right upper   lobe mass measures 3.0 x 3.5 cm (series 2 image 26), previously 3.3 x 3.9   cm. Scattered subcentimeter bilateral pulmonary nodules are noted with a   reference nodule measuring 4 mm in the left upper lobe (2, 19),   previously 5 mm. A 5 mm left in the lower lobe nodule (2, 56) is   unchanged since 3/18/2019. Right middle lobe atelectasis.  PLEURA: Trace right pleural effusion.  VESSELS: Atheromatous disease of the aorta. Coronary artery   calcifications.  HEART: Heart size is normal. No pericardial effusion.  MEDIASTINUM AND JASPAL: Extensive mediastinal and hilar lymphadenopathy.  -A right lower paratracheal node measures 2.3 x 3.3 cm (series 2 image   26), previously 2.3 x 3.6 cm.  -Subcarinal conglomerate of lymph nodes measures 1.6 x 3.6 cm (series 2   image 34), previously 2.1 x 3.6 cm.  -A right hilar lymph node measures 1.1 x 1.3 cm (series 2 image 37),   previously 1.5 x 1.5 cm.  CHEST WALL AND LOWER NECK: Significant supraclavicular lymphadenopathy   with a reference nodes described below:  -Right supraclavicular node causing mass effect on the right jugular vein   measures 2.5 x 2.5 cm (series 2 image 4), previously 2.3 x 2.5 cm.  -A right upper paratracheal node measures 0.8 x 0.7 cm (series 2 image   12), previously 1.3 x 1.4 cm.    ABDOMEN AND PELVIS:    LIVER: Within normal limits.  BILE DUCTS: Normal caliber.  GALLBLADDER: Status post cholecystectomy.  SPLEEN: Within normal limits.  PANCREAS: Within normal limits.  ADRENALS: Indeterminate 1.1 cm right adrenal nodule (series 2 image 67),   not significantly changed since the prior exam 3/18/2019. Left adrenal   gland within normal limits.  KIDNEYS/URETERS: Bilateral renal cysts and hypoattenuating lesions that   are too small to characterize. No hydronephrosis..    BLADDER: Focal irregularity along the posterior wall of the urinary   bladder (2, 139), similar in appearance to the prior exam 3/18/2019.  REPRODUCTIVE ORGANS: Normal sized prostate containing coarse   calcifications.    BOWEL: No bowel obstruction. Normal appendix.  PERITONEUM: No ascites.  VESSELS:  Normal caliber abdominal aorta with atherosclerotic disease.  RETROPERITONEUM: No lymphadenopathy.    ABDOMINAL WALL: Within normal limits.  BONES: Multilevel degenerative changes of the spine.    IMPRESSION:     No significant change in size of the right upper lobe mass with   additional scattered pulmonary nodules and enlarged mediastinal and hilar   lymph nodes consistent with patient's known history of metastatic lung   cancer. A right upper paratracheal node is decreased in size.    Indeterminate 1.1 cm right adrenal nodule is unchanged and suspicious for   metastatic disease.    Focal irregularity along the posterior wall of the urinary bladder;   underlying neoplasm is not excluded. Correlate with urinalysis and direct   visualization as indicated.                   ALIN TREVIZO M.D., RADIOLOGY RESIDENT  This document has been electronically signed.  LEOLA GUERRERO M.D., ATTENDING RADIOLOGIST   This document has been electronically signed. May 13 2019  8:54AM                < end of copied text > MD MENSAH  [id number]    HISTORY OF PRESENT ILLNESS:  73M hx of Metastatic NSCLC, RA, to [facility] ED from [facility] for acute on chronic weakness. As per patient, he has had LUE/LLE weakness since his diagnosis of lung cancer in March of this year. However lately in the last week it has been progressively more difficult for him to stand, walk or transfer from bed to chair because his lower legs are now both weak. Yesterday he had an appointment at Dr. Waterman's office and needed to be carried by his daughter and son in law to clinic. Because of the progressive weakness there was concern for cord compression, and patient was sent to the ED for evaluation.     Denies any fevers, rashes, joint pain, swelling, abdominal pain,n/v/d. Chronic SOB.       RA hx:  Dx 10 years ago in [facility] w pain, swelling of fingers, wrist, knees, ankles, feet. Pt was put on MTX, plaquenil, and initially steroids w good response. He had good control of his symptoms. 3 years ago started following w Dr. Storey. Sees this rheumatologist once a year. Gets refills through primary. Pt says that his symptoms have been uncontrolled for years with this regimen. Pt has not required steroids for his RA for years. No extraarticular     MTX was d/c 2 weeks ago. Currently question re continuation of tx      Lung ca hx:   He presented to [facility] on 3/18/19 after having LUE and LLE weakness, A CT head showed multifocal regions of vasogenic/perilesional edema, concerning for metastatic disease. MRI done 3/18/19 showed several enhancing lesions consistent with diffuse cerebral metastatic lesions.  A CT chest on 3/19/19 revealed 1 3.9 cm right upper lobe mass, in addition to right mediastinal lymphadenopathy.  Right neck mass biopsy on 3/25/19 at [facility] revealed metastatic pulmonary carcinoma, micropapillary type. PDL1 5%, KRAS mutated.  Has been getting GK for his brain mets. Last received last Tuesday.     Has been on dexamethasone for his brain mets. Was on dexamethasone 1mg BID tapered down to 1g yesterday.     PAST MEDICAL & SURGICAL HISTORY:  Non-small cell lung cancer  Hyperlipidemia  Rheumatoid arthritis  Essential hypertension  Type 2 diabetes mellitus  S/P cholecystectomy      Review of Systems:  as per HPI     MEDICATIONS  (STANDING):  aspirin enteric coated 81 milliGRAM(s) Oral daily  buDESOnide  80 MICROgram(s)/formoterol 4.5 MICROgram(s) Inhaler 2 Puff(s) Inhalation two times a day  dexamethasone  Injectable 4 milliGRAM(s) IV Push every 8 hours  gabapentin 200 milliGRAM(s) Oral at bedtime  hydroxychloroquine 200 milliGRAM(s) Oral two times a day  insulin glargine Injectable (LANTUS) 10 Unit(s) SubCutaneous two times a day  levETIRAcetam 500 milliGRAM(s) Oral two times a day  losartan 25 milliGRAM(s) Oral daily  simvastatin 10 milliGRAM(s) Oral at bedtime  tamsulosin 0.4 milliGRAM(s) Oral at bedtime  tiotropium 18 MICROgram(s) Capsule 1 Capsule(s) Inhalation daily    MEDICATIONS  (PRN):  ALBUTerol    90 MICROgram(s) HFA Inhaler 2 Puff(s) Inhalation every 6 hours PRN Shortness of Breath and/or Wheezing      Allergies    No Known Allergies    Intolerances        PERTINENT MEDICATION HISTORY:      · 	gabapentin 100 mg oral capsule: 2 cap(s) orally once a day (at bedtime), Last Dose Taken:    · 	methotrexate 2.5 mg oral tablet: 6 tab(s) orally once a week on Friday , Last Dose Taken:    · 	hydroxychloroquine 200 mg oral tablet: 1 tab(s) orally 2 times a day, Last Dose Taken:    · 	zafirlukast 10 mg oral tablet: 1 tab(s) orally 2 times a day, Last Dose Taken:    · 	folic acid 1 mg oral tablet: 1 tab(s) orally once a day, Last Dose Taken:    · 	dexamethasone 2 mg oral tablet: 1 tab(s) orally 2 times a day, Last Dose Taken:          SOCIAL HISTORY:  No alcohol or drug use. Former tobacco use    FAMILY HISTORY:  FH: leukemia  FH: lung cancer      Vital Signs Last 24 Hrs  T(C): 37.2 (16 May 2019 10:36), Max: 37.2 (16 May 2019 10:36)  T(F): 99 (16 May 2019 10:36), Max: 99 (16 May 2019 10:36)  HR: 84 (16 May 2019 10:36) (62 - 84)  BP: 151/74 (16 May 2019 10:36) (109/60 - 151/74)  BP(mean): --  RR: 18 (16 May 2019 10:36) (16 - 22)  SpO2: 97% (16 May 2019 10:36) (92% - 100%)    Daily     Daily     Physical Exam:  General: obese  HEENT: no oral ulcers. Trush on mouth.   CVS: +S1/S2, no w,c   Resp: low air flow   GI: Soft, NT/ND  MSK: no synovitis   Neuro: AAOx3  muscle strength: R deltoid 5/5. L deltoid 3/5. b/l biceps/triceps, interosseous mm 5/5. L iliopsoas +2/5, R iliopsoas 3/5, quads/hamgstrings 4/5, plantarflex/dorsiflex 5/5    Skin: no  rashes    LABS:                        14.6   6.03  )-----------( 65       ( 15 May 2019 16:50 )             46.7     05-15    138  |  101  |  24<H>  ----------------------------<  244<H>  3.8   |  25  |  0.91    Ca    9.5      15 May 2019 16:50    TPro  6.9  /  Alb  3.7  /  TBili  0.5  /  DBili  x   /  AST  12  /  ALT  23  /  AlkPhos  71  05-15    PT/INR - ( 15 May 2019 18:10 )   PT: 12.4 SEC;   INR: 1.08          PTT - ( 15 May 2019 18:10 )  PTT:30.2 SEC  Urinalysis Basic - ( 15 May 2019 18:10 )    Color: YELLOW / Appearance: CLEAR / S.019 / pH: 6.0  Gluc: 300 / Ketone: NEGATIVE  / Bili: NEGATIVE / Urobili: NORMAL   Blood: SMALL / Protein: 30 / Nitrite: NEGATIVE   Leuk Esterase: MODERATE / RBC: 3-5 / WBC 11-25   Sq Epi: OCC / Non Sq Epi: x / Bacteria: NEGATIVE        RADIOLOGY & ADDITIONAL STUDIES:    < from: MR Thoracic Spine w/wo IV Cont (05.15.19 @ 21:07) >  EXAM:  MR SPINE LUMBAR WAW IC      EXAM:  MR SPINE THORACIC WAW IC        PROCEDURE DATE:  May 15 2019         INTERPRETATION:  CLINICAL INFORMATION: Metastatic lung cancer. Lower   extremity weakness.    TECHNIQUE: Contrast enhanced MR of the lumbar spine was performed.   Sagittal and axial T1 and T2-weighted images through the lumbar spine   were obtained. Sagittal T2, T1, STIR, T1 postcontrast axial T1, T2, and   T1 postcontrast images were obtained. 7.5 cc of Gadavist were   administered with 0 cc discarded.    COMPARISON: CT abdomen/pelvis 2019.    FINDINGS:    THORACIC SPINE:    The thoracic cord is normal in signal.    Vertebral body heights are maintained. Signal arising from bone is normal.    The normal thoracic lordosis is maintained.    Multilevel disc desiccation, most prominently involving T11-T12.    There is an asymmetric central disc bulge with posterior osseous ridging   at C6-C7 that causes minimal spinal canal narrowing. Since this is seen   at the edge of the film dedicated imaging of the cervical spine with MRI   can be done for further evaluation. Otherwise, no significant   neuroforaminal or thoracic spinal canal stenosis.    There is an incompletely visualized enhancing mass in the right upper   lobe, better characterized on recent CT chest from 2019. Partially   demonstrated lymph node involving the right neck region is again seen   compressing the adjacent internal jugular vein. This finding was   demonstrated on prior CT scan chest. Mild symmetric atrophy of the   paraspinal musculature, likely related to chronic disuse.    LUMBAR SPINE:    The distal cord and conus are normal in signal and morphology,   terminating at around the level of L1.    Vertebral body heights are maintained. Signal arising from bone is normal.    The normal lumbar lordosis is maintained. Grade 1 anterolisthesis of L4   on L5.    Multilevel disc desiccation without significant height loss.    Examination of the neuroforamina and spinal canal are as follows:    L1/L2: No neuroforaminal or spinal stenosis.  L2/L3: There is a mild diffuse disc bulge without neural foraminal or   spinal stenosis.  L3/L4: No neuroforaminal or spinal stenosis.  L4/L5: A diffuse central disc bulge superimposed with facet arthrosisand   ligamentum flavum hypertrophy cause mild bilateral foraminal stenosis. No   spinal stenosis.  L5/S1: No neuroforaminal or spinal stenosis. Clinical indication: Lower   extremity weakness.        Visualized portions of the SI joints are unremarkable. Mild symmetric   atrophy of the paraspinal musculature, likely related to chronic disuse.    Small bilateral renal cysts. Visualized intrapelvic contents are   otherwise unremarkable.    IMPRESSION:     No evidence of thoracic or lumbar spinal cord compression or osseous   metastatic disease.    Incompletely visualized right upper lobe mass, better characterized on   recent CT of the chest.              RIAZ PERKINS M.D., RADIOLOGY RESIDENT  This document has been electronically signed.  CHANELLE GARCIA M.D., ATTENDING RADIOLOGIST  This document has been electronically signed. May 16 2019 10:55AM                  < end of copied text >      < from: CT Head No Cont (05.15.19 @ 22:46) >  EXAM:  CT BRAIN        PROCEDURE DATE:  May 15 2019         INTERPRETATION:  NONCONTRAST CT OF THE BRAIN DATED 5/15/2019.    CLINICAL HISTORY: Left lower extremity neuro deficit.    TECHNIQUE: Axial CT images are obtained from the cranial vertex tothe   skull base without the administration of IV contrast. Coronal and   sagittal reformats were obtained.    COMPARISON: CT brain dated 3/17/2019. MRI dated 3/18/2019 and 2019    FINDINGS: There is no obvious acute intracranial hemorrhage or midline   shift. Again noted are scattered metastatic lesions, for example, in the   high right medial frontal region and anterior to the right glen, with   associated vasogenic edema in the high right frontoparietal > left   superior frontal and left anterior inferior frontal region. Nonspecific   mild periventricular and subcortical white matter lucencies may be in   part related to chronic microvessel ischemic changes. There is stable   mild cerebral volume loss with commensurate ventricular dilatation.    There is no displaced skull fracture. There is minimal mucosal thickening   of the sinuses. The right tympanomastoid region is unremarkable. Again   noted is left mastoid opacification.    IMPRESSION:    No acute intracranial hemorrhage. Known intracranial metastasis with   associated vasogenic edema as described. Follow-up contrast-enhanced MRI   would be helpful to assess interval change.              COLUMBA JESUS M.D., RADIOLOGY RESIDENT  This document has been electronically signed.  DENITA MENDOZA M.D., ATTENDING RADIOLOGIST  This document has been electronically signed. 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    COMPARISON: CT of the chest, abdomen, and pelvis from outside hospital   dated 3/18/2019.    PROCEDURE:   CT of the Chest, Abdomen and Pelvis was performed with intravenous   contrast.   Intravenous contrast: 90 ml Omnipaque 350. 10 ml discarded.  Oral contrast: Positive contrast was administered.  Sagittal and coronal reformats were performed.    FINDINGS:    CHEST:     LUNGS AND LARGE AIRWAYS: Patent central airways. Ill-defined right upper   lobe mass measures 3.0 x 3.5 cm (series 2 image 26), previously 3.3 x 3.9   cm. Scattered subcentimeter bilateral pulmonary nodules are noted with a   reference nodule measuring 4 mm in the left upper lobe (2, 19),   previously 5 mm. A 5 mm left in the lower lobe nodule (2, 56) is   unchanged since 3/18/2019. Right middle lobe atelectasis.  PLEURA: Trace right pleural effusion.  VESSELS: Atheromatous disease of the aorta. Coronary artery   calcifications.  HEART: Heart size is normal. No pericardial effusion.  MEDIASTINUM AND JASPAL: Extensive mediastinal and hilar lymphadenopathy.  -A right lower paratracheal node measures 2.3 x 3.3 cm (series 2 image   26), previously 2.3 x 3.6 cm.  -Subcarinal conglomerate of lymph nodes measures 1.6 x 3.6 cm (series 2   image 34), previously 2.1 x 3.6 cm.  -A right hilar lymph node measures 1.1 x 1.3 cm (series 2 image 37),   previously 1.5 x 1.5 cm.  CHEST WALL AND LOWER NECK: Significant supraclavicular lymphadenopathy   with a reference nodes described below:  -Right supraclavicular node causing mass effect on the right jugular vein   measures 2.5 x 2.5 cm (series 2 image 4), previously 2.3 x 2.5 cm.  -A right upper paratracheal node measures 0.8 x 0.7 cm (series 2 image   12), previously 1.3 x 1.4 cm.    ABDOMEN AND PELVIS:    LIVER: Within normal limits.  BILE DUCTS: Normal caliber.  GALLBLADDER: Status post cholecystectomy.  SPLEEN: Within normal limits.  PANCREAS: Within normal limits.  ADRENALS: Indeterminate 1.1 cm right adrenal nodule (series 2 image 67),   not significantly changed since the prior exam 3/18/2019. Left adrenal   gland within normal limits.  KIDNEYS/URETERS: Bilateral renal cysts and hypoattenuating lesions that   are too small to characterize. No hydronephrosis..    BLADDER: Focal irregularity along the posterior wall of the urinary   bladder (2, 139), similar in appearance to the prior exam 3/18/2019.  REPRODUCTIVE ORGANS: Normal sized prostate containing coarse   calcifications.    BOWEL: No bowel obstruction. Normal appendix.  PERITONEUM: No ascites.  VESSELS:  Normal caliber abdominal aorta with atherosclerotic disease.  RETROPERITONEUM: No lymphadenopathy.    ABDOMINAL WALL: Within normal limits.  BONES: Multilevel degenerative changes of the spine.    IMPRESSION:     No significant change in size of the right upper lobe mass with   additional scattered pulmonary nodules and enlarged mediastinal and hilar   lymph nodes consistent with patient's known history of metastatic lung   cancer. A right upper paratracheal node is decreased in size.    Indeterminate 1.1 cm right adrenal nodule is unchanged and suspicious for   metastatic disease.    Focal irregularity along the posterior wall of the urinary bladder;   underlying neoplasm is not excluded. Correlate with urinalysis and direct   visualization as indicated.                   ALIN TREVIZO M.D., RADIOLOGY RESIDENT  This document has been electronically signed.  LEOLA GUERRERO M.D., ATTENDING RADIOLOGIST   This document has been electronically signed. May 13 2019  8:54AM                < end of copied text >

## 2019-05-16 NOTE — CONSULT NOTE ADULT - ASSESSMENT
Incomplete  73M hx of Metastatic NSCLC, Former tobacco use, T2DM, Rheumatoid Arthritis, Essential Hypertension presents to [facility] ED from [facility] for Acute on Chronic weakness. He has a known brain mass, right sided and parasagittal area. Last CTH showed a significant edema around the mass, mainly it appears to be on right frontal, parasagittal area, affecting left parasagittal can cause BL LE weakness Also there is a left M1 hypodensity concerning for another lesion, with some edema, but no visible mass on CTH as well as a few other metastatic lesion. MRI brain is recommended to be done with contrast to see worsening of the swelling or possible new lesions.  continue with steroids 73M hx of Metastatic NSCLC, Former tobacco use, T2DM, Rheumatoid Arthritis, Essential Hypertension presents to [facility] ED from [facility] for Acute on Chronic weakness. He has a known brain mass, right sided and parasagittal area. Last CTH showed a significant edema around the mass, mainly it appears to be on right frontal, parasagittal area, affecting left parasagittal can cause BL LE weakness Also there is a left M1 hypodensity concerning for another lesion, with some edema, but no visible mass on this area, as well as a few other metastatic lesion. MRI brain is recommended to be done with contrast to see worsening of the swelling or possible new lesions.  [] continue with steroids same dose  [] get the MRI brain with contrast to see the extent of the edema  [] Can be transitioned to PO dexa and slow taper after MRI brain 73M hx of Metastatic NSCLC, Former tobacco use, T2DM, Rheumatoid Arthritis, Essential Hypertension presents to [facility] ED from [facility] for Acute on Chronic weakness. He has a known brain mass, right sided and parasagittal area. Last CTH showed a significant edema around the mass, mainly it appears to be on right frontal, parasagittal area, affecting left parasagittal can cause BL LE weakness Also there is a left M1 hypodensity concerning for another lesion, with some edema, but no visible mass on this area, as well as a few other metastatic lesion. MRI brain is recommended to be done with contrast to see worsening of the swelling or possible new lesions.    Impression: Post radiation worsening edema is likely the diagnosis. On CTH, no ICH. Other causes are less likely.   [] continue with steroids same dose  [] get the MRI brain with contrast to see the extent of the edema  [] Can be transitioned to PO dexamethasone and slow taper after MRI brain  [] Dexamethasone management as per radiation oncology or neurosurgery 73M hx of Metastatic NSCLC, Former tobacco use, T2DM, Rheumatoid Arthritis, Essential Hypertension presents to [facility] ED from [facility] for Acute on Chronic weakness. He has a known brain mass, right sided and parasagittal area. Last CTH showed a significant edema around the mass, mainly it appears to be on right frontal, parasagittal area, affecting left parasagittal can cause BL LE weakness Also there is a left M1 hypodensity concerning for another lesion, with some edema, but no visible mass on this area, as well as a few other metastatic lesion. MRI brain is recommended to be done with contrast to see worsening of the swelling or possible new lesions. Neurology was consulted to r/o paraneoplastic syndrome. Patient's symptoms can be explained by brain mets and edema, which is different than paraneoplastic encephalopathy.    Impression: Post radiation worsening edema is likely the diagnosis. On CTH, no ICH. Other causes are less likely.   [] continue with steroids same dose  [] get the MRI brain with contrast to see the extent of the edema  [] Can be transitioned to PO dexamethasone and slow taper after MRI brain  [] Dexamethasone management as per radiation oncology or neurosurgery

## 2019-05-16 NOTE — REVIEW OF SYSTEMS
[Fatigue] : fatigue [Difficulty Walking] : difficulty walking [Muscle Weakness] : muscle weakness [Negative] : Heme/Lymph

## 2019-05-16 NOTE — H&P ADULT - PROBLEM SELECTOR PLAN 8
Defer pharmacologic DVT ppx given metastatic brain disease. c/w Bilateral SCDs c/w RA medications as previously prescribed

## 2019-05-16 NOTE — H&P ADULT - PROBLEM SELECTOR PLAN 7
c/w RA medications as previously prescribed c/w anti hypertensive medications as previously prescribed

## 2019-05-16 NOTE — HISTORY OF PRESENT ILLNESS
[de-identified] : Mr Rebolledo is a 73m with significant smoking history who presents for evaluation of advanced NSCLC. \par He presented to [facility] on 3/18/19 after having LUE and LLE weakness, A CT head showed multifocal regions of vasogenic/perilesional edema, concerning for metastatic disease. MRI done 3/18/19 showed several enhancing lesions consistent with diffuse cerebral metastatic lesions.\par A CT chest on 3/19/19 revealed 1 3.9 cm right upper lobe mass, in addition to right mediastinal lymphadenopathy.\par Right neck mass biopsy on 3/25/19 at [facility] revealed metastatic pulmonary carcinoma, micropapillary type. PDL1 5%, KRAS mutated.\par He was evaluated by radiation oncology Dr Estrada and By neurosurgery Dr Ferreira and was initially planned for Gamma Knife, but had significant progression of disease and plan was changed to WBRT. \par  \par He lives with his daughter and has good social support.  [de-identified] : \par Mr Rebolledo is feeling very weak in his b/l lower extremities since last night. He was able to walk and climb stairs until last night despite feeling generally weak. \par Last night he felt acutely worsening lower extremity weakness, left more than right, his daughter massaged his legs with some improvement, however he is still not able to move lower extremities and had to be carried by his daughter and son in law to clinic today. \par He is s/p 4/5 sessions of WBRT.

## 2019-05-16 NOTE — H&P ADULT - PROBLEM SELECTOR PLAN 3
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 As per oncology outpatient notes, patient to undergo Gamma Knife therapy   - Follow up Oncology and Radiation Oncology recommendations

## 2019-05-17 LAB
ALBUMIN SERPL ELPH-MCNC: 3.4 G/DL — SIGNIFICANT CHANGE UP (ref 3.3–5)
ALP SERPL-CCNC: 76 U/L — SIGNIFICANT CHANGE UP (ref 40–120)
ALT FLD-CCNC: 26 U/L — SIGNIFICANT CHANGE UP (ref 4–41)
ANION GAP SERPL CALC-SCNC: 16 MMO/L — HIGH (ref 7–14)
AST SERPL-CCNC: 15 U/L — SIGNIFICANT CHANGE UP (ref 4–40)
BACTERIA UR CULT: SIGNIFICANT CHANGE UP
BASOPHILS # BLD AUTO: 0.02 K/UL — SIGNIFICANT CHANGE UP (ref 0–0.2)
BASOPHILS NFR BLD AUTO: 0.4 % — SIGNIFICANT CHANGE UP (ref 0–2)
BILIRUB SERPL-MCNC: 0.6 MG/DL — SIGNIFICANT CHANGE UP (ref 0.2–1.2)
BUN SERPL-MCNC: 25 MG/DL — HIGH (ref 7–23)
CALCIUM SERPL-MCNC: 8.9 MG/DL — SIGNIFICANT CHANGE UP (ref 8.4–10.5)
CHLORIDE SERPL-SCNC: 97 MMOL/L — LOW (ref 98–107)
CK SERPL-CCNC: 20 U/L — LOW (ref 30–200)
CO2 SERPL-SCNC: 22 MMOL/L — SIGNIFICANT CHANGE UP (ref 22–31)
CREAT SERPL-MCNC: 0.89 MG/DL — SIGNIFICANT CHANGE UP (ref 0.5–1.3)
EOSINOPHIL # BLD AUTO: 0 K/UL — SIGNIFICANT CHANGE UP (ref 0–0.5)
EOSINOPHIL NFR BLD AUTO: 0 % — SIGNIFICANT CHANGE UP (ref 0–6)
GLUCOSE BLDC GLUCOMTR-MCNC: 322 MG/DL — HIGH (ref 70–99)
GLUCOSE BLDC GLUCOMTR-MCNC: 371 MG/DL — HIGH (ref 70–99)
GLUCOSE BLDC GLUCOMTR-MCNC: 372 MG/DL — HIGH (ref 70–99)
GLUCOSE SERPL-MCNC: 307 MG/DL — HIGH (ref 70–99)
HBA1C BLD-MCNC: 10.7 % — HIGH (ref 4–5.6)
HCT VFR BLD CALC: 44.4 % — SIGNIFICANT CHANGE UP (ref 39–50)
HGB BLD-MCNC: 14.4 G/DL — SIGNIFICANT CHANGE UP (ref 13–17)
IMM GRANULOCYTES NFR BLD AUTO: 5.2 % — HIGH (ref 0–1.5)
LYMPHOCYTES # BLD AUTO: 0.66 K/UL — LOW (ref 1–3.3)
LYMPHOCYTES # BLD AUTO: 14.3 % — SIGNIFICANT CHANGE UP (ref 13–44)
MAGNESIUM SERPL-MCNC: 1.9 MG/DL — SIGNIFICANT CHANGE UP (ref 1.6–2.6)
MANUAL SMEAR VERIFICATION: SIGNIFICANT CHANGE UP
MCHC RBC-ENTMCNC: 27.1 PG — SIGNIFICANT CHANGE UP (ref 27–34)
MCHC RBC-ENTMCNC: 32.4 % — SIGNIFICANT CHANGE UP (ref 32–36)
MCV RBC AUTO: 83.5 FL — SIGNIFICANT CHANGE UP (ref 80–100)
MONOCYTES # BLD AUTO: 0.07 K/UL — SIGNIFICANT CHANGE UP (ref 0–0.9)
MONOCYTES NFR BLD AUTO: 1.5 % — LOW (ref 2–14)
NEUTROPHILS # BLD AUTO: 3.63 K/UL — SIGNIFICANT CHANGE UP (ref 1.8–7.4)
NEUTROPHILS NFR BLD AUTO: 78.6 % — HIGH (ref 43–77)
NRBC # FLD: 0.02 K/UL — SIGNIFICANT CHANGE UP (ref 0–0)
PHOSPHATE SERPL-MCNC: 4.7 MG/DL — HIGH (ref 2.5–4.5)
PLATELET # BLD AUTO: 62 K/UL — LOW (ref 150–400)
PMV BLD: 12.9 FL — SIGNIFICANT CHANGE UP (ref 7–13)
POTASSIUM SERPL-MCNC: 4.4 MMOL/L — SIGNIFICANT CHANGE UP (ref 3.5–5.3)
POTASSIUM SERPL-SCNC: 4.4 MMOL/L — SIGNIFICANT CHANGE UP (ref 3.5–5.3)
PROT SERPL-MCNC: 6.5 G/DL — SIGNIFICANT CHANGE UP (ref 6–8.3)
RBC # BLD: 5.32 M/UL — SIGNIFICANT CHANGE UP (ref 4.2–5.8)
RBC # FLD: 14.8 % — HIGH (ref 10.3–14.5)
SODIUM SERPL-SCNC: 135 MMOL/L — SIGNIFICANT CHANGE UP (ref 135–145)
SPECIMEN SOURCE: SIGNIFICANT CHANGE UP
WBC # BLD: 4.62 K/UL — SIGNIFICANT CHANGE UP (ref 3.8–10.5)
WBC # FLD AUTO: 4.62 K/UL — SIGNIFICANT CHANGE UP (ref 3.8–10.5)

## 2019-05-17 PROCEDURE — 99233 SBSQ HOSP IP/OBS HIGH 50: CPT

## 2019-05-17 PROCEDURE — 99232 SBSQ HOSP IP/OBS MODERATE 35: CPT

## 2019-05-17 PROCEDURE — 99221 1ST HOSP IP/OBS SF/LOW 40: CPT

## 2019-05-17 RX ORDER — ACETAMINOPHEN 500 MG
650 TABLET ORAL EVERY 6 HOURS
Refills: 0 | Status: DISCONTINUED | OUTPATIENT
Start: 2019-05-17 | End: 2019-05-22

## 2019-05-17 RX ORDER — OXYCODONE HYDROCHLORIDE 5 MG/1
5 TABLET ORAL EVERY 6 HOURS
Refills: 0 | Status: DISCONTINUED | OUTPATIENT
Start: 2019-05-17 | End: 2019-05-22

## 2019-05-17 RX ADMIN — Medication 500000 UNIT(S): at 13:15

## 2019-05-17 RX ADMIN — INSULIN GLARGINE 10 UNIT(S): 100 INJECTION, SOLUTION SUBCUTANEOUS at 21:40

## 2019-05-17 RX ADMIN — Medication 4: at 08:48

## 2019-05-17 RX ADMIN — Medication 81 MILLIGRAM(S): at 13:17

## 2019-05-17 RX ADMIN — Medication 500000 UNIT(S): at 06:14

## 2019-05-17 RX ADMIN — LOSARTAN POTASSIUM 25 MILLIGRAM(S): 100 TABLET, FILM COATED ORAL at 06:14

## 2019-05-17 RX ADMIN — OXYCODONE HYDROCHLORIDE 5 MILLIGRAM(S): 5 TABLET ORAL at 14:15

## 2019-05-17 RX ADMIN — Medication 4 MILLIGRAM(S): at 21:39

## 2019-05-17 RX ADMIN — OXYCODONE HYDROCHLORIDE 5 MILLIGRAM(S): 5 TABLET ORAL at 13:15

## 2019-05-17 RX ADMIN — LEVETIRACETAM 500 MILLIGRAM(S): 250 TABLET, FILM COATED ORAL at 18:00

## 2019-05-17 RX ADMIN — BUDESONIDE AND FORMOTEROL FUMARATE DIHYDRATE 2 PUFF(S): 160; 4.5 AEROSOL RESPIRATORY (INHALATION) at 21:39

## 2019-05-17 RX ADMIN — ALBUTEROL 2 PUFF(S): 90 AEROSOL, METERED ORAL at 06:15

## 2019-05-17 RX ADMIN — Medication 4: at 13:14

## 2019-05-17 RX ADMIN — BUDESONIDE AND FORMOTEROL FUMARATE DIHYDRATE 2 PUFF(S): 160; 4.5 AEROSOL RESPIRATORY (INHALATION) at 08:47

## 2019-05-17 RX ADMIN — SIMVASTATIN 10 MILLIGRAM(S): 20 TABLET, FILM COATED ORAL at 21:40

## 2019-05-17 RX ADMIN — Medication 4 MILLIGRAM(S): at 13:16

## 2019-05-17 RX ADMIN — TAMSULOSIN HYDROCHLORIDE 0.4 MILLIGRAM(S): 0.4 CAPSULE ORAL at 21:39

## 2019-05-17 RX ADMIN — GABAPENTIN 200 MILLIGRAM(S): 400 CAPSULE ORAL at 21:39

## 2019-05-17 RX ADMIN — Medication 200 MILLIGRAM(S): at 06:14

## 2019-05-17 RX ADMIN — TIOTROPIUM BROMIDE 1 CAPSULE(S): 18 CAPSULE ORAL; RESPIRATORY (INHALATION) at 08:48

## 2019-05-17 RX ADMIN — LEVETIRACETAM 500 MILLIGRAM(S): 250 TABLET, FILM COATED ORAL at 06:14

## 2019-05-17 RX ADMIN — Medication 6: at 21:38

## 2019-05-17 RX ADMIN — Medication 4 MILLIGRAM(S): at 06:35

## 2019-05-17 RX ADMIN — Medication 500000 UNIT(S): at 18:00

## 2019-05-17 RX ADMIN — Medication 200 MILLIGRAM(S): at 18:00

## 2019-05-17 RX ADMIN — Medication 5: at 18:00

## 2019-05-17 RX ADMIN — INSULIN GLARGINE 10 UNIT(S): 100 INJECTION, SOLUTION SUBCUTANEOUS at 08:48

## 2019-05-17 NOTE — PROGRESS NOTE ADULT - SUBJECTIVE AND OBJECTIVE BOX
Patient is a 73y old  Male who presents with a chief complaint of B/L LE weakness (17 May 2019 14:42)      SUBJECTIVE / OVERNIGHT EVENTS:   [id number]  Pt seen earlier, little better but having pain in lower back for past 4 days, hard to sit up d/t pain, 9/10 pain.  No chest pain, SOB, nausea, vomiting.    MEDICATIONS  (STANDING):  aspirin enteric coated 81 milliGRAM(s) Oral daily  buDESOnide  80 MICROgram(s)/formoterol 4.5 MICROgram(s) Inhaler 2 Puff(s) Inhalation two times a day  dexamethasone  Injectable 4 milliGRAM(s) IV Push every 8 hours  dextrose 5%. 1000 milliLiter(s) (50 mL/Hr) IV Continuous <Continuous>  dextrose 50% Injectable 12.5 Gram(s) IV Push once  dextrose 50% Injectable 25 Gram(s) IV Push once  dextrose 50% Injectable 25 Gram(s) IV Push once  gabapentin 200 milliGRAM(s) Oral at bedtime  hydroxychloroquine 200 milliGRAM(s) Oral two times a day  insulin glargine Injectable (LANTUS) 10 Unit(s) SubCutaneous two times a day  insulin lispro (HumaLOG) corrective regimen sliding scale   SubCutaneous three times a day before meals  insulin lispro (HumaLOG) corrective regimen sliding scale   SubCutaneous at bedtime  levETIRAcetam 500 milliGRAM(s) Oral two times a day  losartan 25 milliGRAM(s) Oral daily  nystatin    Suspension [id number] Unit(s) Oral four times a day  simvastatin 10 milliGRAM(s) Oral at bedtime  tamsulosin 0.4 milliGRAM(s) Oral at bedtime  tiotropium 18 MICROgram(s) Capsule 1 Capsule(s) Inhalation daily    MEDICATIONS  (PRN):  acetaminophen   Tablet .. 650 milliGRAM(s) Oral every 6 hours PRN Mild Pain (1 - 3)  ALBUTerol    90 MICROgram(s) HFA Inhaler 2 Puff(s) Inhalation every 6 hours PRN Shortness of Breath and/or Wheezing  dextrose 40% Gel 15 Gram(s) Oral once PRN Blood Glucose LESS THAN 70 milliGRAM(s)/deciliter  glucagon  Injectable 1 milliGRAM(s) IntraMuscular once PRN Glucose LESS THAN 70 milligrams/deciliter  oxyCODONE    IR 5 milliGRAM(s) Oral every 6 hours PRN moderate to severe pain      CAPILLARY BLOOD GLUCOSE      POCT Blood Glucose.: 371 mg/dL (17 May 2019 17:54)  POCT Blood Glucose.: 322 mg/dL (17 May 2019 12:38)  POCT Blood Glucose.: 310 mg/dL (17 May 2019 08:45)  POCT Blood Glucose.: 288 mg/dL (16 May 2019 21:53)      I&O's Summary      Vital Signs Last 24 Hrs  T(C): 36.4 (17 May 2019 13:33), Max: 36.5 (16 May 2019 21:10)  T(F): 97.5 (17 May 2019 13:33), Max: 97.7 (16 May 2019 21:10)  HR: 70 (17 May 2019 13:33) (68 - 72)  BP: 114/67 (17 May 2019 13:33) (114/67 - 138/72)  BP(mean): --  RR: 17 (17 May 2019 13:33) (17 - 18)  SpO2: 95% (17 May 2019 13:33) (92% - 98%)    PHYSICAL EXAM:  GENERAL: elderly M, lying in bed, NAD  HEAD:  Atraumatic, Normocephalic  EYES: EOMI, PERRLA, conjunctiva and sclera clear  NECK: Supple, No JVD  CHEST/LUNG: Clear to auscultation bilaterally; No wheeze  HEART: Regular rate and rhythm; No murmurs, rubs, or gallops  ABDOMEN: Soft, Nontender, Nondistended; Bowel sounds present  EXTREMITIES:  2+ Peripheral Pulses, No clubbing, cyanosis, or edema  PSYCH: AAOx3  NEUROLOGY: L>RLE weakness  SKIN: No rashes or lesions    LABS:                        14.4   4.62  )-----------( 62       ( 17 May 2019 06:30 )             44.4     05-17    135  |  97<L>  |  25<H>  ----------------------------<  307<H>  4.4   |  22  |  0.89    Ca    8.9      17 May 2019 07:30  Phos  4.7     05-17  Mg     1.9     05-17    TPro  6.5  /  Alb  3.4  /  TBili  0.6  /  DBili  x   /  AST  15  /  ALT  26  /  AlkPhos  76  05-17      CARDIAC MARKERS ( 17 May 2019 07:30 )  x     / x     / 20 u/L / x     / x              RADIOLOGY & ADDITIONAL TESTS:    Imaging Personally Reviewed:    Consultant(s) Notes Reviewed:      Care Discussed with Consultants/Other Providers: RN, SW, CM, ADS, onc re overall care

## 2019-05-17 NOTE — PROGRESS NOTE ADULT - PROBLEM SELECTOR PLAN 3
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 As per oncology outpatient notes, patient to undergo Gamma Knife therapy   per onc - MRI brain shows worsening brain lesions- poor prognosis --> outpt onc f/u

## 2019-05-17 NOTE — PROGRESS NOTE ADULT - SUBJECTIVE AND OBJECTIVE BOX
Hematology Oncology Follow-up    INTERVAL HPI/OVERNIGHT EVENTS:  Patient complains of pain on both hips Patient specifically denies fever, chills, dizziness, weakness, CP, palpitations, SOB, cough, N/V/D/C, dysuria, changes in bowel movements, LE edema.    Review of Systems:  General: denies fevers/chills  Head: denies HA  Eyes: denies vision change  Respiratory: denies SOB  Cardiovascular: denies CP  Gastrointestinal: denies diarrhea  Neuro: denies weakness  Skin: denies rash      VITAL SIGNS:  T(F): 97.5 (19 @ 13:33)  HR: 70 (19 @ 13:33)  BP: 114/67 (19 @ 13:33)  RR: 17 (19 @ 13:33)  SpO2: 95% (19 @ 13:33)  Wt(kg): --      PHYSICAL EXAM:    Constitutional: AAOx3, NAD   Eyes: PERRL, EOMI, sclera non-icteric  Neck: supple, no masses, no JVD  Respiratory: CTA b/l, no wheezing, rhonchi, rales, with normal respiratory effort  Cardiovascular: RRR, normal S1S2, no M/R/G  Gastrointestinal: soft, NTND, no masses palpable, BS normal in all four quadrants, no HSM  Extremities:  no c/c/e  MSK: no obvious abnormalities.   Neurological: Grossly intact  Skin: Normal temperature, no rash  Psych: normal affect    MEDICATIONS  (STANDING):  aspirin enteric coated 81 milliGRAM(s) Oral daily  buDESOnide  80 MICROgram(s)/formoterol 4.5 MICROgram(s) Inhaler 2 Puff(s) Inhalation two times a day  dexamethasone  Injectable 4 milliGRAM(s) IV Push every 8 hours  dextrose 5%. 1000 milliLiter(s) (50 mL/Hr) IV Continuous <Continuous>  dextrose 50% Injectable 12.5 Gram(s) IV Push once  dextrose 50% Injectable 25 Gram(s) IV Push once  dextrose 50% Injectable 25 Gram(s) IV Push once  gabapentin 200 milliGRAM(s) Oral at bedtime  hydroxychloroquine 200 milliGRAM(s) Oral two times a day  insulin glargine Injectable (LANTUS) 10 Unit(s) SubCutaneous two times a day  insulin lispro (HumaLOG) corrective regimen sliding scale   SubCutaneous three times a day before meals  insulin lispro (HumaLOG) corrective regimen sliding scale   SubCutaneous at bedtime  levETIRAcetam 500 milliGRAM(s) Oral two times a day  losartan 25 milliGRAM(s) Oral daily  nystatin    Suspension 116820 Unit(s) Oral four times a day  simvastatin 10 milliGRAM(s) Oral at bedtime  tamsulosin 0.4 milliGRAM(s) Oral at bedtime  tiotropium 18 MICROgram(s) Capsule 1 Capsule(s) Inhalation daily    MEDICATIONS  (PRN):  acetaminophen   Tablet .. 650 milliGRAM(s) Oral every 6 hours PRN Mild Pain (1 - 3)  ALBUTerol    90 MICROgram(s) HFA Inhaler 2 Puff(s) Inhalation every 6 hours PRN Shortness of Breath and/or Wheezing  dextrose 40% Gel 15 Gram(s) Oral once PRN Blood Glucose LESS THAN 70 milliGRAM(s)/deciliter  glucagon  Injectable 1 milliGRAM(s) IntraMuscular once PRN Glucose LESS THAN 70 milligrams/deciliter  oxyCODONE    IR 5 milliGRAM(s) Oral every 6 hours PRN moderate to severe pain      No Known Allergies      LABS:                        14.4   4.62  )-----------( 62       ( 17 May 2019 06:30 )             44.4     05-17    135  |  97<L>  |  25<H>  ----------------------------<  307<H>  4.4   |  22  |  0.89    Ca    8.9      17 May 2019 07:30  Phos  4.7     05-17  Mg     1.9     -17    TPro  6.5  /  Alb  3.4  /  TBili  0.6  /  DBili  x   /  AST  15  /  ALT  26  /  AlkPhos  76  05-17    PT/INR - ( 15 May 2019 18:10 )   PT: 12.4 SEC;   INR: 1.08          PTT - ( 15 May 2019 18:10 )  PTT:30.2 SEC   Urinalysis Basic - ( 15 May 2019 18:10 )    Color: YELLOW / Appearance: CLEAR / S.019 / pH: 6.0  Gluc: 300 / Ketone: NEGATIVE  / Bili: NEGATIVE / Urobili: NORMAL   Blood: SMALL / Protein: 30 / Nitrite: NEGATIVE   Leuk Esterase: MODERATE / RBC: 3-5 / WBC 11-25   Sq Epi: OCC / Non Sq Epi: x / Bacteria: NEGATIVE        RADIOLOGY & ADDITIONAL TESTS:  Studies reviewed.    MRI head 19 - Interval increased size of intracranial metastatic foci. Patent basilar cisterns. No major midline shift.

## 2019-05-17 NOTE — PROGRESS NOTE ADULT - ASSESSMENT
73M with Metastatic NSCLC (adeno) with multiple brain mets, on whole brain RT, Rheumatoid Arthritis (was on MTX, stopped since 2 weeks ago), Essential Hypertension referred to ED from [facility] for Acute on Chronic weakness.   MRI T and L spine negative.   Thrombocytopenia.    -Appreciate rad onc input  -MRI brain shows worsening brain lesions- poor prognosis  -Increase dex to 4mg q8h, keep on current dexamethasone dosage  -FSG checks and protonix while on steroids  -Appreciate Neurology consult   -Rheumatology consult, patient was on MTX and it was held ~ 2 weeks ago  -DVT ppx once platelets are consistently over 50K  -Continue nystatin swish and swallow for oral thrush  -Supportive care, pain control, Nutrition, PT, DVT ppx  -Outpatient oncology f/u    Will follow. Please do not hesitate to call back with questions.     Ekaterina Gómez MD  Medical Oncology Attending

## 2019-05-17 NOTE — PHYSICAL THERAPY INITIAL EVALUATION ADULT - GENERAL OBSERVATIONS, REHAB EVAL
patient found supine in bed; [place]ish speaking; pacific  phone used [facility] # [phone]; +02 .

## 2019-05-17 NOTE — PHYSICAL THERAPY INITIAL EVALUATION ADULT - PLANNED THERAPY INTERVENTIONS, PT EVAL
transfer training/bed mobility training/Patient left supine in bed in NAD, call bell in reach, all lines intact. +02; +bed alarm/gait training/strengthening/ROM/balance training

## 2019-05-17 NOTE — PHYSICAL THERAPY INITIAL EVALUATION ADULT - ACTIVE RANGE OF MOTION EXAMINATION, REHAB EVAL
bilateral LE hip flexion 0-65 degrees; bilateral knee extension -25 degrees from neutral; bilateral ankle df/pf 0-5 degrees/bilateral upper extremity Active ROM was WFL (within functional limits)

## 2019-05-18 LAB
ANION GAP SERPL CALC-SCNC: 11 MMO/L — SIGNIFICANT CHANGE UP (ref 7–14)
BASOPHILS # BLD AUTO: 0.03 K/UL — SIGNIFICANT CHANGE UP (ref 0–0.2)
BASOPHILS NFR BLD AUTO: 0.4 % — SIGNIFICANT CHANGE UP (ref 0–2)
BUN SERPL-MCNC: 32 MG/DL — HIGH (ref 7–23)
C DIFF TOX GENS STL QL NAA+PROBE: SIGNIFICANT CHANGE UP
CALCIUM SERPL-MCNC: 8.7 MG/DL — SIGNIFICANT CHANGE UP (ref 8.4–10.5)
CHLORIDE SERPL-SCNC: 103 MMOL/L — SIGNIFICANT CHANGE UP (ref 98–107)
CO2 SERPL-SCNC: 22 MMOL/L — SIGNIFICANT CHANGE UP (ref 22–31)
CREAT SERPL-MCNC: 0.88 MG/DL — SIGNIFICANT CHANGE UP (ref 0.5–1.3)
EOSINOPHIL # BLD AUTO: 0 K/UL — SIGNIFICANT CHANGE UP (ref 0–0.5)
EOSINOPHIL NFR BLD AUTO: 0 % — SIGNIFICANT CHANGE UP (ref 0–6)
GLUCOSE BLDC GLUCOMTR-MCNC: 314 MG/DL — HIGH (ref 70–99)
GLUCOSE BLDC GLUCOMTR-MCNC: 348 MG/DL — HIGH (ref 70–99)
GLUCOSE BLDC GLUCOMTR-MCNC: 353 MG/DL — HIGH (ref 70–99)
GLUCOSE BLDC GLUCOMTR-MCNC: 425 MG/DL — HIGH (ref 70–99)
GLUCOSE SERPL-MCNC: 386 MG/DL — HIGH (ref 70–99)
HCT VFR BLD CALC: 41.7 % — SIGNIFICANT CHANGE UP (ref 39–50)
HGB BLD-MCNC: 13.8 G/DL — SIGNIFICANT CHANGE UP (ref 13–17)
IMM GRANULOCYTES NFR BLD AUTO: 2.3 % — HIGH (ref 0–1.5)
LYMPHOCYTES # BLD AUTO: 0.74 K/UL — LOW (ref 1–3.3)
LYMPHOCYTES # BLD AUTO: 9.3 % — LOW (ref 13–44)
MAGNESIUM SERPL-MCNC: 2.2 MG/DL — SIGNIFICANT CHANGE UP (ref 1.6–2.6)
MCHC RBC-ENTMCNC: 27.6 PG — SIGNIFICANT CHANGE UP (ref 27–34)
MCHC RBC-ENTMCNC: 33.1 % — SIGNIFICANT CHANGE UP (ref 32–36)
MCV RBC AUTO: 83.4 FL — SIGNIFICANT CHANGE UP (ref 80–100)
MONOCYTES # BLD AUTO: 0.32 K/UL — SIGNIFICANT CHANGE UP (ref 0–0.9)
MONOCYTES NFR BLD AUTO: 4 % — SIGNIFICANT CHANGE UP (ref 2–14)
NEUTROPHILS # BLD AUTO: 6.73 K/UL — SIGNIFICANT CHANGE UP (ref 1.8–7.4)
NEUTROPHILS NFR BLD AUTO: 84 % — HIGH (ref 43–77)
NRBC # FLD: 0 K/UL — SIGNIFICANT CHANGE UP (ref 0–0)
PHOSPHATE SERPL-MCNC: 2.8 MG/DL — SIGNIFICANT CHANGE UP (ref 2.5–4.5)
PLATELET # BLD AUTO: 71 K/UL — LOW (ref 150–400)
PMV BLD: 12.6 FL — SIGNIFICANT CHANGE UP (ref 7–13)
POTASSIUM SERPL-MCNC: 4.7 MMOL/L — SIGNIFICANT CHANGE UP (ref 3.5–5.3)
POTASSIUM SERPL-SCNC: 4.7 MMOL/L — SIGNIFICANT CHANGE UP (ref 3.5–5.3)
RBC # BLD: 5 M/UL — SIGNIFICANT CHANGE UP (ref 4.2–5.8)
RBC # FLD: 14.8 % — HIGH (ref 10.3–14.5)
SODIUM SERPL-SCNC: 136 MMOL/L — SIGNIFICANT CHANGE UP (ref 135–145)
WBC # BLD: 8 K/UL — SIGNIFICANT CHANGE UP (ref 3.8–10.5)
WBC # FLD AUTO: 8 K/UL — SIGNIFICANT CHANGE UP (ref 3.8–10.5)

## 2019-05-18 PROCEDURE — 93010 ELECTROCARDIOGRAM REPORT: CPT

## 2019-05-18 PROCEDURE — 99233 SBSQ HOSP IP/OBS HIGH 50: CPT

## 2019-05-18 RX ORDER — ENOXAPARIN SODIUM 100 MG/ML
40 INJECTION SUBCUTANEOUS DAILY
Refills: 0 | Status: DISCONTINUED | OUTPATIENT
Start: 2019-05-18 | End: 2019-05-22

## 2019-05-18 RX ORDER — INSULIN LISPRO 100/ML
VIAL (ML) SUBCUTANEOUS
Refills: 0 | Status: DISCONTINUED | OUTPATIENT
Start: 2019-05-18 | End: 2019-05-22

## 2019-05-18 RX ORDER — INSULIN LISPRO 100/ML
2 VIAL (ML) SUBCUTANEOUS
Refills: 0 | Status: DISCONTINUED | OUTPATIENT
Start: 2019-05-18 | End: 2019-05-19

## 2019-05-18 RX ORDER — INSULIN GLARGINE 100 [IU]/ML
14 INJECTION, SOLUTION SUBCUTANEOUS
Refills: 0 | Status: DISCONTINUED | OUTPATIENT
Start: 2019-05-18 | End: 2019-05-19

## 2019-05-18 RX ORDER — SODIUM CHLORIDE 9 MG/ML
250 INJECTION INTRAMUSCULAR; INTRAVENOUS; SUBCUTANEOUS ONCE
Refills: 0 | Status: DISCONTINUED | OUTPATIENT
Start: 2019-05-18 | End: 2019-05-18

## 2019-05-18 RX ADMIN — GABAPENTIN 200 MILLIGRAM(S): 400 CAPSULE ORAL at 22:12

## 2019-05-18 RX ADMIN — ALBUTEROL 2 PUFF(S): 90 AEROSOL, METERED ORAL at 08:56

## 2019-05-18 RX ADMIN — Medication 12: at 13:06

## 2019-05-18 RX ADMIN — LOSARTAN POTASSIUM 25 MILLIGRAM(S): 100 TABLET, FILM COATED ORAL at 06:12

## 2019-05-18 RX ADMIN — OXYCODONE HYDROCHLORIDE 5 MILLIGRAM(S): 5 TABLET ORAL at 10:21

## 2019-05-18 RX ADMIN — INSULIN GLARGINE 10 UNIT(S): 100 INJECTION, SOLUTION SUBCUTANEOUS at 08:55

## 2019-05-18 RX ADMIN — Medication 2 UNIT(S): at 18:12

## 2019-05-18 RX ADMIN — ENOXAPARIN SODIUM 40 MILLIGRAM(S): 100 INJECTION SUBCUTANEOUS at 13:28

## 2019-05-18 RX ADMIN — BUDESONIDE AND FORMOTEROL FUMARATE DIHYDRATE 2 PUFF(S): 160; 4.5 AEROSOL RESPIRATORY (INHALATION) at 22:12

## 2019-05-18 RX ADMIN — Medication 200 MILLIGRAM(S): at 18:11

## 2019-05-18 RX ADMIN — Medication 500000 UNIT(S): at 00:52

## 2019-05-18 RX ADMIN — Medication 200 MILLIGRAM(S): at 06:12

## 2019-05-18 RX ADMIN — Medication 2 UNIT(S): at 13:06

## 2019-05-18 RX ADMIN — SIMVASTATIN 10 MILLIGRAM(S): 20 TABLET, FILM COATED ORAL at 22:12

## 2019-05-18 RX ADMIN — TAMSULOSIN HYDROCHLORIDE 0.4 MILLIGRAM(S): 0.4 CAPSULE ORAL at 22:13

## 2019-05-18 RX ADMIN — Medication 4: at 08:55

## 2019-05-18 RX ADMIN — Medication 4 MILLIGRAM(S): at 13:09

## 2019-05-18 RX ADMIN — OXYCODONE HYDROCHLORIDE 5 MILLIGRAM(S): 5 TABLET ORAL at 11:20

## 2019-05-18 RX ADMIN — Medication 500000 UNIT(S): at 13:09

## 2019-05-18 RX ADMIN — Medication 500000 UNIT(S): at 18:11

## 2019-05-18 RX ADMIN — Medication 10: at 18:12

## 2019-05-18 RX ADMIN — Medication 500000 UNIT(S): at 06:12

## 2019-05-18 RX ADMIN — Medication 4 MILLIGRAM(S): at 06:12

## 2019-05-18 RX ADMIN — TIOTROPIUM BROMIDE 1 CAPSULE(S): 18 CAPSULE ORAL; RESPIRATORY (INHALATION) at 08:54

## 2019-05-18 RX ADMIN — LEVETIRACETAM 500 MILLIGRAM(S): 250 TABLET, FILM COATED ORAL at 06:12

## 2019-05-18 RX ADMIN — Medication 4: at 22:28

## 2019-05-18 RX ADMIN — Medication 81 MILLIGRAM(S): at 13:09

## 2019-05-18 RX ADMIN — BUDESONIDE AND FORMOTEROL FUMARATE DIHYDRATE 2 PUFF(S): 160; 4.5 AEROSOL RESPIRATORY (INHALATION) at 08:54

## 2019-05-18 RX ADMIN — LEVETIRACETAM 500 MILLIGRAM(S): 250 TABLET, FILM COATED ORAL at 18:12

## 2019-05-18 RX ADMIN — INSULIN GLARGINE 14 UNIT(S): 100 INJECTION, SOLUTION SUBCUTANEOUS at 22:28

## 2019-05-18 NOTE — PROGRESS NOTE ADULT - ASSESSMENT
73M hx of Metastatic NSCLC, Former tobacco use, T2DM, Rheumatoid Arthritis, Essential Hypertension presents to [facility] ED from [facility] for Acute on Chronic weakness. As per patient, he has had LUE/LLE weakness since his diagnosis of lung cancer in March of this year.  Mass in right frontal area     continue decadron     continue keppra for seizure prevention

## 2019-05-18 NOTE — PROGRESS NOTE ADULT - PROBLEM SELECTOR PLAN 5
c/w sliding scale insulin. Check FS qid  on lantus 10 bid, FSBGs poorly controlled on higher dose steroids --> increase lantus to 14 bid, add premeal 2, increase to mod ssI, f/u FSBGs closely

## 2019-05-18 NOTE — PROGRESS NOTE ADULT - SUBJECTIVE AND OBJECTIVE BOX
Patient is a 73y old  Male who presents with a chief complaint of B/L LE weakness (17 May 2019 19:51)      SUBJECTIVE / OVERNIGHT EVENTS:  # [id number]  Pt seen earlier,  said having trouble understanding all pt's speech, ?different dialect.  Pt sitting up in chair, NAD.  C/o pain in R chest (not clear if worse with cough/deep breathing), little dizzy, congested cough.  No nausea/vomiting.      MEDICATIONS  (STANDING):  aspirin enteric coated 81 milliGRAM(s) Oral daily  buDESOnide  80 MICROgram(s)/formoterol 4.5 MICROgram(s) Inhaler 2 Puff(s) Inhalation two times a day  dexamethasone  Injectable 4 milliGRAM(s) IV Push every 8 hours  dextrose 5%. 1000 milliLiter(s) (50 mL/Hr) IV Continuous <Continuous>  dextrose 50% Injectable 12.5 Gram(s) IV Push once  dextrose 50% Injectable 25 Gram(s) IV Push once  dextrose 50% Injectable 25 Gram(s) IV Push once  enoxaparin Injectable 40 milliGRAM(s) SubCutaneous daily  gabapentin 200 milliGRAM(s) Oral at bedtime  hydroxychloroquine 200 milliGRAM(s) Oral two times a day  insulin glargine Injectable (LANTUS) 14 Unit(s) SubCutaneous two times a day  insulin lispro (HumaLOG) corrective regimen sliding scale   SubCutaneous at bedtime  insulin lispro (HumaLOG) corrective regimen sliding scale   SubCutaneous three times a day before meals  insulin lispro Injectable (HumaLOG) 2 Unit(s) SubCutaneous before breakfast  insulin lispro Injectable (HumaLOG) 2 Unit(s) SubCutaneous before lunch  insulin lispro Injectable (HumaLOG) 2 Unit(s) SubCutaneous before dinner  levETIRAcetam 500 milliGRAM(s) Oral two times a day  losartan 25 milliGRAM(s) Oral daily  nystatin    Suspension [id number] Unit(s) Oral four times a day  simvastatin 10 milliGRAM(s) Oral at bedtime  tamsulosin 0.4 milliGRAM(s) Oral at bedtime  tiotropium 18 MICROgram(s) Capsule 1 Capsule(s) Inhalation daily    MEDICATIONS  (PRN):  acetaminophen   Tablet .. 650 milliGRAM(s) Oral every 6 hours PRN Mild Pain (1 - 3)  ALBUTerol    90 MICROgram(s) HFA Inhaler 2 Puff(s) Inhalation every 6 hours PRN Shortness of Breath and/or Wheezing  dextrose 40% Gel 15 Gram(s) Oral once PRN Blood Glucose LESS THAN 70 milliGRAM(s)/deciliter  glucagon  Injectable 1 milliGRAM(s) IntraMuscular once PRN Glucose LESS THAN 70 milligrams/deciliter  oxyCODONE    IR 5 milliGRAM(s) Oral every 6 hours PRN moderate to severe pain      CAPILLARY BLOOD GLUCOSE      POCT Blood Glucose.: 425 mg/dL (18 May 2019 12:26)  POCT Blood Glucose.: 348 mg/dL (18 May 2019 08:45)  POCT Blood Glucose.: 372 mg/dL (17 May 2019 21:25)  POCT Blood Glucose.: 371 mg/dL (17 May 2019 17:54)      I&O's Summary      Vital Signs Last 24 Hrs  T(C): 36.4 (18 May 2019 13:29), Max: 36.6 (17 May 2019 20:29)  T(F): 97.6 (18 May 2019 13:29), Max: 97.8 (17 May 2019 20:29)  HR: 71 (18 May 2019 13:29) (55 - 79)  BP: 123/62 (18 May 2019 13:29) (115/65 - 123/62)  BP(mean): --  RR: 20 (18 May 2019 13:29) (18 - 20)  SpO2: 95% (18 May 2019 13:29) (92% - 95%)    PHYSICAL EXAM:  GENERAL: elderly M, sitting up in chair, NAD  HEAD:  Atraumatic, Normocephalic  EYES: EOMI, PERRLA, conjunctiva and sclera clear  NECK: Supple, No JVD  CHEST/LUNG: Clear to auscultation bilaterally; No wheeze  HEART: Regular rate and rhythm; No murmurs, rubs, or gallops  ABDOMEN: Soft, Nontender, Nondistended; Bowel sounds present  EXTREMITIES:  2+ Peripheral Pulses, No clubbing, cyanosis, or edema  PSYCH: AAOx3  NEUROLOGY: L>RLE weakness  SKIN: No rashes or lesions    LABS:                        13.8   8.00  )-----------( 71       ( 18 May 2019 06:00 )             41.7     05-18    136  |  103  |  32<H>  ----------------------------<  386<H>  4.7   |  22  |  0.88    Ca    8.7      18 May 2019 06:00  Phos  2.8     05-18  Mg     2.2     05-18    TPro  6.5  /  Alb  3.4  /  TBili  0.6  /  DBili  x   /  AST  15  /  ALT  26  /  AlkPhos  76  05-17      CARDIAC MARKERS ( 17 May 2019 07:30 )  x     / x     / 20 u/L / x     / x              RADIOLOGY & ADDITIONAL TESTS:    Imaging Personally Reviewed:    Consultant(s) Notes Reviewed:      Care Discussed with Consultants/Other Providers: RN, SW, CM, ADS, onc re overall care

## 2019-05-18 NOTE — PROGRESS NOTE ADULT - PROBLEM SELECTOR PLAN 4
likely d/t underlying disease, f/u plts/H&H  DVT ppx once plts consistently >50K --> start lovenox ppx

## 2019-05-18 NOTE — PROGRESS NOTE ADULT - SUBJECTIVE AND OBJECTIVE BOX
Neurology Progress    MD [id number]    HPI:  73M hx of Metastatic NSCLC, Former tobacco use, T2DM, Rheumatoid Arthritis, Essential Hypertension presents to [facility] ED from [facility] for Acute on Chronic weakness. As per patient, he has had LUE/LLE weakness since his diagnosis of lung cancer in March of this year. However lately in the last week it has been progressively more difficult for him to stand, walk or transfer from bed to chair because his lower legs are now both weak. Yesterday he had an appointment at Dr. Waterman's office and needed to be carried by his daughter and son in law to clinic. Because of the progressive weakness there was concern for cord compression, and patient was sent to the ED for evaluation.     In the ED patient underwent MRI of T-spine and L-spine. Also got Rocephin 1g IV x 1, and Percocet. Patient seen at bedside. Weakness he feels is getting worse, and complains of intermittent back pain. No fevers or chills (16 May 2019 11:26)      Past Medical History  Non-small cell lung cancer  Hyperlipidemia  Rheumatoid arthritis  Essential hypertension  Type 2 diabetes mellitus      Past Surgical History  S/P cholecystectomy      MEDICATIONS    acetaminophen   Tablet .. 650 milliGRAM(s) Oral every 6 hours PRN  ALBUTerol    90 MICROgram(s) HFA Inhaler 2 Puff(s) Inhalation every 6 hours PRN  aspirin enteric coated 81 milliGRAM(s) Oral daily  buDESOnide  80 MICROgram(s)/formoterol 4.5 MICROgram(s) Inhaler 2 Puff(s) Inhalation two times a day  dexamethasone  Injectable 4 milliGRAM(s) IV Push every 8 hours  dextrose 40% Gel 15 Gram(s) Oral once PRN  dextrose 5%. 1000 milliLiter(s) IV Continuous <Continuous>  dextrose 50% Injectable 12.5 Gram(s) IV Push once  dextrose 50% Injectable 25 Gram(s) IV Push once  dextrose 50% Injectable 25 Gram(s) IV Push once  enoxaparin Injectable 40 milliGRAM(s) SubCutaneous daily  gabapentin 200 milliGRAM(s) Oral at bedtime  glucagon  Injectable 1 milliGRAM(s) IntraMuscular once PRN  hydroxychloroquine 200 milliGRAM(s) Oral two times a day  insulin glargine Injectable (LANTUS) 14 Unit(s) SubCutaneous two times a day  insulin lispro (HumaLOG) corrective regimen sliding scale   SubCutaneous at bedtime  insulin lispro (HumaLOG) corrective regimen sliding scale   SubCutaneous three times a day before meals  insulin lispro Injectable (HumaLOG) 2 Unit(s) SubCutaneous before breakfast  insulin lispro Injectable (HumaLOG) 2 Unit(s) SubCutaneous before lunch  insulin lispro Injectable (HumaLOG) 2 Unit(s) SubCutaneous before dinner  levETIRAcetam 500 milliGRAM(s) Oral two times a day  losartan 25 milliGRAM(s) Oral daily  nystatin    Suspension 181262 Unit(s) Oral four times a day  oxyCODONE    IR 5 milliGRAM(s) Oral every 6 hours PRN  simvastatin 10 milliGRAM(s) Oral at bedtime  tamsulosin 0.4 milliGRAM(s) Oral at bedtime  tiotropium 18 MICROgram(s) Capsule 1 Capsule(s) Inhalation daily         Family history: No history of dementia, strokes, or seizures   FAMILY HISTORY:  FH: leukemia  FH: lung cancer    SOCIAL HISTORY -- No history of tobacco or alcohol use     Allergies    No Known Allergies    Intolerances            Vital Signs Last 24 Hrs  T(C): 36.4 (18 May 2019 13:29), Max: 36.6 (17 May 2019 20:29)  T(F): 97.6 (18 May 2019 13:29), Max: 97.8 (17 May 2019 20:29)  HR: 71 (18 May 2019 13:29) (55 - 79)  BP: 123/62 (18 May 2019 13:29) (115/65 - 123/62)  BP(mean): --  RR: 20 (18 May 2019 13:29) (18 - 20)  SpO2: 95% (18 May 2019 13:29) (92% - 95%)        On Neurological Examination:    Mental Status - Patient is alert, awake, oriented X3. .   Follows commands well and able to answer questions appropriately. Mood and affect  normal  Follow simple commands able to repeat  able to name.  Speech - Fluent no Dysarthria  no  Aphasia                              Cranial Nerves - Extraocular muscle intact  MARIAM Facial symmetry Tongue midline, CnV1to V3 intact gross hearing intact      Motor Exam -   Right upper  5/5 throughout  Left upper 4/5 throughtout  Right lower- 4/5 throughout  Left lower 4/5 throughout  Coordination -finger to nose intact  Muscle tone - is normal all over. No asymmetry is seen.      Sensory    Bilateral intact to light touch    Gait -  abole to take a few steps     GENERAL Exam:     Nontoxic , No Acute Distress   	  HEENT:  normocephalic, atraumatic  		  LUNGS:	Clear bilaterally  No Wheeze      VASCULAR: no carotid brui  	  HEART:	 Normal S1S2   No murmur RRR        	  MUSCULOSKELETAL: Normal Range of Motion  	   SKIN:      Normal   No Ecchymosis               LABS:  CBC Full  -  ( 18 May 2019 06:00 )  WBC Count : 8.00 K/uL  RBC Count : 5.00 M/uL  Hemoglobin : 13.8 g/dL  Hematocrit : 41.7 %  Platelet Count - Automated : 71 K/uL  Mean Cell Volume : 83.4 fL  Mean Cell Hemoglobin : 27.6 pg  Mean Cell Hemoglobin Concentration : 33.1 %  Auto Neutrophil # : 6.73 K/uL  Auto Lymphocyte # : 0.74 K/uL  Auto Monocyte # : 0.32 K/uL  Auto Eosinophil # : 0.00 K/uL  Auto Basophil # : 0.03 K/uL  Auto Neutrophil % : 84.0 %  Auto Lymphocyte % : 9.3 %  Auto Monocyte % : 4.0 %  Auto Eosinophil % : 0.0 %  Auto Basophil % : 0.4 %      05-18    136  |  103  |  32<H>  ----------------------------<  386<H>  4.7   |  22  |  0.88    Ca    8.7      18 May 2019 06:00  Phos  2.8     05-18  Mg     2.2     05-18    TPro  6.5  /  Alb  3.4  /  TBili  0.6  /  DBili  x   /  AST  15  /  ALT  26  /  AlkPhos  76  05-17    Hemoglobin A1C:     LIVER FUNCTIONS - ( 17 May 2019 07:30 )  Alb: 3.4 g/dL / Pro: 6.5 g/dL / ALK PHOS: 76 u/L / ALT: 26 u/L / AST: 15 u/L / GGT: x           Vitamin B12         RADIOLOGY    EKG                schoenberg

## 2019-05-18 NOTE — PROGRESS NOTE ADULT - PROBLEM SELECTOR PLAN 3
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  Per rads onc s/p 9/10 WBRT with Dr. Estrada at [facility], no SCC on imaging this admission, upon discharge he can be seen for re-evaluation by Dr. Estrada to determine if the last remaining fraction can be given, no role for inpt radiation  per onc - MRI brain shows worsening brain lesions- poor prognosis --> outpt onc f/u, consider PC eval Diagnosed 3/2019 after being found to have right upper lobe mass with mediastinal adenopathy and metastatic brain disease.  Per rads onc s/p 9/10 WBRT with Dr. Estrada at [facility], no SCC on imaging this admission, upon discharge he can be seen for re-evaluation by Dr. Estrada to determine if the last remaining fraction can be given, no role for inpt radiation  per onc - MRI brain shows worsening brain lesions- poor prognosis --> outpt onc f/u, consider PC eval  chest pain - ECG no acute changes, pain at site of tumor, likely from underlying tumor

## 2019-05-19 LAB
ANION GAP SERPL CALC-SCNC: 14 MMO/L — SIGNIFICANT CHANGE UP (ref 7–14)
BASOPHILS # BLD AUTO: 0.02 K/UL — SIGNIFICANT CHANGE UP (ref 0–0.2)
BASOPHILS NFR BLD AUTO: 0.3 % — SIGNIFICANT CHANGE UP (ref 0–2)
BASOPHILS NFR SPEC: 0 % — SIGNIFICANT CHANGE UP (ref 0–2)
BLASTS # FLD: 0 % — SIGNIFICANT CHANGE UP (ref 0–0)
BUN SERPL-MCNC: 33 MG/DL — HIGH (ref 7–23)
CALCIUM SERPL-MCNC: 8.7 MG/DL — SIGNIFICANT CHANGE UP (ref 8.4–10.5)
CHLORIDE SERPL-SCNC: 102 MMOL/L — SIGNIFICANT CHANGE UP (ref 98–107)
CO2 SERPL-SCNC: 21 MMOL/L — LOW (ref 22–31)
CREAT SERPL-MCNC: 0.82 MG/DL — SIGNIFICANT CHANGE UP (ref 0.5–1.3)
EOSINOPHIL # BLD AUTO: 0 K/UL — SIGNIFICANT CHANGE UP (ref 0–0.5)
EOSINOPHIL NFR BLD AUTO: 0 % — SIGNIFICANT CHANGE UP (ref 0–6)
EOSINOPHIL NFR FLD: 0 % — SIGNIFICANT CHANGE UP (ref 0–6)
GIANT PLATELETS BLD QL SMEAR: PRESENT — SIGNIFICANT CHANGE UP
GLUCOSE BLDC GLUCOMTR-MCNC: 238 MG/DL — HIGH (ref 70–99)
GLUCOSE BLDC GLUCOMTR-MCNC: 245 MG/DL — HIGH (ref 70–99)
GLUCOSE BLDC GLUCOMTR-MCNC: 310 MG/DL — HIGH (ref 70–99)
GLUCOSE BLDC GLUCOMTR-MCNC: 356 MG/DL — HIGH (ref 70–99)
GLUCOSE SERPL-MCNC: 384 MG/DL — HIGH (ref 70–99)
HCT VFR BLD CALC: 42.1 % — SIGNIFICANT CHANGE UP (ref 39–50)
HGB BLD-MCNC: 13.3 G/DL — SIGNIFICANT CHANGE UP (ref 13–17)
IMM GRANULOCYTES NFR BLD AUTO: 2.8 % — HIGH (ref 0–1.5)
LYMPHOCYTES # BLD AUTO: 0.63 K/UL — LOW (ref 1–3.3)
LYMPHOCYTES # BLD AUTO: 9.3 % — LOW (ref 13–44)
LYMPHOCYTES NFR SPEC AUTO: 7.9 % — LOW (ref 13–44)
MAGNESIUM SERPL-MCNC: 2.2 MG/DL — SIGNIFICANT CHANGE UP (ref 1.6–2.6)
MCHC RBC-ENTMCNC: 26.8 PG — LOW (ref 27–34)
MCHC RBC-ENTMCNC: 31.6 % — LOW (ref 32–36)
MCV RBC AUTO: 84.9 FL — SIGNIFICANT CHANGE UP (ref 80–100)
METAMYELOCYTES # FLD: 0 % — SIGNIFICANT CHANGE UP (ref 0–1)
MONOCYTES # BLD AUTO: 0.33 K/UL — SIGNIFICANT CHANGE UP (ref 0–0.9)
MONOCYTES NFR BLD AUTO: 4.9 % — SIGNIFICANT CHANGE UP (ref 2–14)
MONOCYTES NFR BLD: 0 % — LOW (ref 2–9)
MYELOCYTES NFR BLD: 1.8 % — HIGH (ref 0–0)
NEUTROPHIL AB SER-ACNC: 86.8 % — HIGH (ref 43–77)
NEUTROPHILS # BLD AUTO: 5.61 K/UL — SIGNIFICANT CHANGE UP (ref 1.8–7.4)
NEUTROPHILS NFR BLD AUTO: 82.7 % — HIGH (ref 43–77)
NEUTS BAND # BLD: 1.7 % — SIGNIFICANT CHANGE UP (ref 0–6)
NRBC # FLD: 0.05 K/UL — SIGNIFICANT CHANGE UP (ref 0–0)
OTHER - HEMATOLOGY %: 0 — SIGNIFICANT CHANGE UP
PHOSPHATE SERPL-MCNC: 2.8 MG/DL — SIGNIFICANT CHANGE UP (ref 2.5–4.5)
PLATELET # BLD AUTO: 84 K/UL — LOW (ref 150–400)
PLATELET COUNT - ESTIMATE: SIGNIFICANT CHANGE UP
PMV BLD: 13.5 FL — HIGH (ref 7–13)
POIKILOCYTOSIS BLD QL AUTO: SLIGHT — SIGNIFICANT CHANGE UP
POTASSIUM SERPL-MCNC: 4.7 MMOL/L — SIGNIFICANT CHANGE UP (ref 3.5–5.3)
POTASSIUM SERPL-SCNC: 4.7 MMOL/L — SIGNIFICANT CHANGE UP (ref 3.5–5.3)
PROMYELOCYTES # FLD: 0 % — SIGNIFICANT CHANGE UP (ref 0–0)
RBC # BLD: 4.96 M/UL — SIGNIFICANT CHANGE UP (ref 4.2–5.8)
RBC # FLD: 15 % — HIGH (ref 10.3–14.5)
SODIUM SERPL-SCNC: 137 MMOL/L — SIGNIFICANT CHANGE UP (ref 135–145)
VARIANT LYMPHS # BLD: 1.8 % — SIGNIFICANT CHANGE UP
WBC # BLD: 6.78 K/UL — SIGNIFICANT CHANGE UP (ref 3.8–10.5)
WBC # FLD AUTO: 6.78 K/UL — SIGNIFICANT CHANGE UP (ref 3.8–10.5)

## 2019-05-19 PROCEDURE — 99233 SBSQ HOSP IP/OBS HIGH 50: CPT

## 2019-05-19 RX ORDER — INSULIN LISPRO 100/ML
6 VIAL (ML) SUBCUTANEOUS
Refills: 0 | Status: DISCONTINUED | OUTPATIENT
Start: 2019-05-19 | End: 2019-05-20

## 2019-05-19 RX ORDER — INSULIN GLARGINE 100 [IU]/ML
20 INJECTION, SOLUTION SUBCUTANEOUS
Refills: 0 | Status: DISCONTINUED | OUTPATIENT
Start: 2019-05-19 | End: 2019-05-20

## 2019-05-19 RX ADMIN — Medication 2 UNIT(S): at 09:04

## 2019-05-19 RX ADMIN — Medication 500000 UNIT(S): at 18:21

## 2019-05-19 RX ADMIN — GABAPENTIN 200 MILLIGRAM(S): 400 CAPSULE ORAL at 22:45

## 2019-05-19 RX ADMIN — Medication 4 MILLIGRAM(S): at 07:08

## 2019-05-19 RX ADMIN — Medication 8: at 12:35

## 2019-05-19 RX ADMIN — INSULIN GLARGINE 14 UNIT(S): 100 INJECTION, SOLUTION SUBCUTANEOUS at 09:03

## 2019-05-19 RX ADMIN — Medication 200 MILLIGRAM(S): at 18:22

## 2019-05-19 RX ADMIN — Medication 4 MILLIGRAM(S): at 16:01

## 2019-05-19 RX ADMIN — Medication 200 MILLIGRAM(S): at 05:42

## 2019-05-19 RX ADMIN — TAMSULOSIN HYDROCHLORIDE 0.4 MILLIGRAM(S): 0.4 CAPSULE ORAL at 22:45

## 2019-05-19 RX ADMIN — Medication 81 MILLIGRAM(S): at 11:07

## 2019-05-19 RX ADMIN — Medication 6 UNIT(S): at 18:20

## 2019-05-19 RX ADMIN — Medication 4: at 18:20

## 2019-05-19 RX ADMIN — Medication 10: at 09:03

## 2019-05-19 RX ADMIN — Medication 6 UNIT(S): at 12:35

## 2019-05-19 RX ADMIN — SIMVASTATIN 10 MILLIGRAM(S): 20 TABLET, FILM COATED ORAL at 22:45

## 2019-05-19 RX ADMIN — LEVETIRACETAM 500 MILLIGRAM(S): 250 TABLET, FILM COATED ORAL at 18:21

## 2019-05-19 RX ADMIN — BUDESONIDE AND FORMOTEROL FUMARATE DIHYDRATE 2 PUFF(S): 160; 4.5 AEROSOL RESPIRATORY (INHALATION) at 22:45

## 2019-05-19 RX ADMIN — Medication 4 MILLIGRAM(S): at 22:45

## 2019-05-19 RX ADMIN — TIOTROPIUM BROMIDE 1 CAPSULE(S): 18 CAPSULE ORAL; RESPIRATORY (INHALATION) at 09:03

## 2019-05-19 RX ADMIN — Medication 500000 UNIT(S): at 11:07

## 2019-05-19 RX ADMIN — BUDESONIDE AND FORMOTEROL FUMARATE DIHYDRATE 2 PUFF(S): 160; 4.5 AEROSOL RESPIRATORY (INHALATION) at 09:02

## 2019-05-19 RX ADMIN — LEVETIRACETAM 500 MILLIGRAM(S): 250 TABLET, FILM COATED ORAL at 05:42

## 2019-05-19 RX ADMIN — Medication 500000 UNIT(S): at 05:42

## 2019-05-19 RX ADMIN — INSULIN GLARGINE 20 UNIT(S): 100 INJECTION, SOLUTION SUBCUTANEOUS at 22:58

## 2019-05-19 RX ADMIN — Medication 4 MILLIGRAM(S): at 00:08

## 2019-05-19 RX ADMIN — ENOXAPARIN SODIUM 40 MILLIGRAM(S): 100 INJECTION SUBCUTANEOUS at 11:07

## 2019-05-19 NOTE — PROGRESS NOTE ADULT - SUBJECTIVE AND OBJECTIVE BOX
Patient is a 73y old  Male who presents with a chief complaint of B/L LE weakness (18 May 2019 17:24)      SUBJECTIVE / OVERNIGHT EVENTS:    MEDICATIONS  (STANDING):  aspirin enteric coated 81 milliGRAM(s) Oral daily  buDESOnide  80 MICROgram(s)/formoterol 4.5 MICROgram(s) Inhaler 2 Puff(s) Inhalation two times a day  dexamethasone  Injectable 4 milliGRAM(s) IV Push every 8 hours  dextrose 5%. 1000 milliLiter(s) (50 mL/Hr) IV Continuous <Continuous>  dextrose 50% Injectable 12.5 Gram(s) IV Push once  dextrose 50% Injectable 25 Gram(s) IV Push once  dextrose 50% Injectable 25 Gram(s) IV Push once  enoxaparin Injectable 40 milliGRAM(s) SubCutaneous daily  gabapentin 200 milliGRAM(s) Oral at bedtime  hydroxychloroquine 200 milliGRAM(s) Oral two times a day  insulin glargine Injectable (LANTUS) 14 Unit(s) SubCutaneous two times a day  insulin lispro (HumaLOG) corrective regimen sliding scale   SubCutaneous at bedtime  insulin lispro (HumaLOG) corrective regimen sliding scale   SubCutaneous three times a day before meals  insulin lispro Injectable (HumaLOG) 2 Unit(s) SubCutaneous before breakfast  insulin lispro Injectable (HumaLOG) 2 Unit(s) SubCutaneous before lunch  insulin lispro Injectable (HumaLOG) 2 Unit(s) SubCutaneous before dinner  levETIRAcetam 500 milliGRAM(s) Oral two times a day  losartan 25 milliGRAM(s) Oral daily  nystatin    Suspension 485047 Unit(s) Oral four times a day  simvastatin 10 milliGRAM(s) Oral at bedtime  tamsulosin 0.4 milliGRAM(s) Oral at bedtime  tiotropium 18 MICROgram(s) Capsule 1 Capsule(s) Inhalation daily    MEDICATIONS  (PRN):  acetaminophen   Tablet .. 650 milliGRAM(s) Oral every 6 hours PRN Mild Pain (1 - 3)  ALBUTerol    90 MICROgram(s) HFA Inhaler 2 Puff(s) Inhalation every 6 hours PRN Shortness of Breath and/or Wheezing  dextrose 40% Gel 15 Gram(s) Oral once PRN Blood Glucose LESS THAN 70 milliGRAM(s)/deciliter  glucagon  Injectable 1 milliGRAM(s) IntraMuscular once PRN Glucose LESS THAN 70 milligrams/deciliter  oxyCODONE    IR 5 milliGRAM(s) Oral every 6 hours PRN moderate to severe pain      CAPILLARY BLOOD GLUCOSE      POCT Blood Glucose.: 314 mg/dL (18 May 2019 22:20)  POCT Blood Glucose.: 353 mg/dL (18 May 2019 17:51)  POCT Blood Glucose.: 425 mg/dL (18 May 2019 12:26)  POCT Blood Glucose.: 348 mg/dL (18 May 2019 08:45)      I&O's Summary      Vital Signs Last 24 Hrs  T(C): 36.4 (19 May 2019 05:40), Max: 36.6 (18 May 2019 22:07)  T(F): 97.5 (19 May 2019 05:40), Max: 97.8 (18 May 2019 22:07)  HR: 53 (19 May 2019 05:40) (53 - 71)  BP: 108/57 (19 May 2019 05:40) (108/57 - 127/60)  BP(mean): --  RR: 16 (19 May 2019 05:40) (16 - 20)  SpO2: 97% (19 May 2019 05:40) (95% - 97%)    PHYSICAL EXAM:  GENERAL: elderly M, sitting up in chair, NAD  HEAD:  Atraumatic, Normocephalic  EYES: EOMI, PERRLA, conjunctiva and sclera clear  NECK: Supple, No JVD  CHEST/LUNG: Clear to auscultation bilaterally; No wheeze  HEART: Regular rate and rhythm; No murmurs, rubs, or gallops  ABDOMEN: Soft, Nontender, Nondistended; Bowel sounds present  EXTREMITIES:  2+ Peripheral Pulses, No clubbing, cyanosis, or edema  PSYCH: AAOx3  NEUROLOGY: L>RLE weakness  SKIN: No rashes or lesions    LABS:                        13.3   6.78  )-----------( 84       ( 19 May 2019 05:15 )             42.1     05-19    137  |  102  |  33<H>  ----------------------------<  384<H>  4.7   |  21<L>  |  0.82    Ca    8.7      19 May 2019 05:15  Phos  2.8     05-19  Mg     2.2     05-19                RADIOLOGY & ADDITIONAL TESTS:    Imaging Personally Reviewed:    Consultant(s) Notes Reviewed:      Care Discussed with Consultants/Other Providers: RN, MAXINE re overall care Patient is a 73y old  Male who presents with a chief complaint of B/L LE weakness (18 May 2019 17:24)      SUBJECTIVE / OVERNIGHT EVENTS:  RN reported pt had 2 episodes diarrhea yesterday, Cdiff negative.  # [id number] Pt says "a little bit good", little headache on L better after medicine, breathing ok.  Wants his daughter called.    MEDICATIONS  (STANDING):  aspirin enteric coated 81 milliGRAM(s) Oral daily  buDESOnide  80 MICROgram(s)/formoterol 4.5 MICROgram(s) Inhaler 2 Puff(s) Inhalation two times a day  dexamethasone  Injectable 4 milliGRAM(s) IV Push every 8 hours  dextrose 5%. 1000 milliLiter(s) (50 mL/Hr) IV Continuous <Continuous>  dextrose 50% Injectable 12.5 Gram(s) IV Push once  dextrose 50% Injectable 25 Gram(s) IV Push once  dextrose 50% Injectable 25 Gram(s) IV Push once  enoxaparin Injectable 40 milliGRAM(s) SubCutaneous daily  gabapentin 200 milliGRAM(s) Oral at bedtime  hydroxychloroquine 200 milliGRAM(s) Oral two times a day  insulin glargine Injectable (LANTUS) 14 Unit(s) SubCutaneous two times a day  insulin lispro (HumaLOG) corrective regimen sliding scale   SubCutaneous at bedtime  insulin lispro (HumaLOG) corrective regimen sliding scale   SubCutaneous three times a day before meals  insulin lispro Injectable (HumaLOG) 2 Unit(s) SubCutaneous before breakfast  insulin lispro Injectable (HumaLOG) 2 Unit(s) SubCutaneous before lunch  insulin lispro Injectable (HumaLOG) 2 Unit(s) SubCutaneous before dinner  levETIRAcetam 500 milliGRAM(s) Oral two times a day  losartan 25 milliGRAM(s) Oral daily  nystatin    Suspension 019755 Unit(s) Oral four times a day  simvastatin 10 milliGRAM(s) Oral at bedtime  tamsulosin 0.4 milliGRAM(s) Oral at bedtime  tiotropium 18 MICROgram(s) Capsule 1 Capsule(s) Inhalation daily    MEDICATIONS  (PRN):  acetaminophen   Tablet .. 650 milliGRAM(s) Oral every 6 hours PRN Mild Pain (1 - 3)  ALBUTerol    90 MICROgram(s) HFA Inhaler 2 Puff(s) Inhalation every 6 hours PRN Shortness of Breath and/or Wheezing  dextrose 40% Gel 15 Gram(s) Oral once PRN Blood Glucose LESS THAN 70 milliGRAM(s)/deciliter  glucagon  Injectable 1 milliGRAM(s) IntraMuscular once PRN Glucose LESS THAN 70 milligrams/deciliter  oxyCODONE    IR 5 milliGRAM(s) Oral every 6 hours PRN moderate to severe pain      CAPILLARY BLOOD GLUCOSE      POCT Blood Glucose.: 314 mg/dL (18 May 2019 22:20)  POCT Blood Glucose.: 353 mg/dL (18 May 2019 17:51)  POCT Blood Glucose.: 425 mg/dL (18 May 2019 12:26)  POCT Blood Glucose.: 348 mg/dL (18 May 2019 08:45)      I&O's Summary      Vital Signs Last 24 Hrs  T(C): 36.4 (19 May 2019 05:40), Max: 36.6 (18 May 2019 22:07)  T(F): 97.5 (19 May 2019 05:40), Max: 97.8 (18 May 2019 22:07)  HR: 53 (19 May 2019 05:40) (53 - 71)  BP: 108/57 (19 May 2019 05:40) (108/57 - 127/60)  BP(mean): --  RR: 16 (19 May 2019 05:40) (16 - 20)  SpO2: 97% (19 May 2019 05:40) (95% - 97%)    PHYSICAL EXAM:  GENERAL: elderly M, sitting up in bed, NAD, NC O2  HEAD:  Atraumatic, Normocephalic  EYES: EOMI, PERRLA, conjunctiva and sclera clear  NECK: Supple, No JVD  CHEST/LUNG: dec BS bases  HEART: Regular rate and rhythm; No murmurs, rubs, or gallops  ABDOMEN: Soft, Nontender, Nondistended; Bowel sounds present  EXTREMITIES:  2+ Peripheral Pulses, No clubbing, cyanosis, or edema  PSYCH: calm  NEUROLOGY: L>RLE weakness  SKIN: No rashes or lesions    LABS:                        13.3   6.78  )-----------( 84       ( 19 May 2019 05:15 )             42.1     05-19    137  |  102  |  33<H>  ----------------------------<  384<H>  4.7   |  21<L>  |  0.82    Ca    8.7      19 May 2019 05:15  Phos  2.8     05-19  Mg     2.2     05-19                RADIOLOGY & ADDITIONAL TESTS:    Imaging Personally Reviewed:    Consultant(s) Notes Reviewed:      Care Discussed with Consultants/Other Providers: RN, MAXINE re overall care

## 2019-05-19 NOTE — PROGRESS NOTE ADULT - SUBJECTIVE AND OBJECTIVE BOX
· Subjective and Objective: 	  Neurology Progress    MD [id number]    HPI:  73M hx of Metastatic NSCLC, Former tobacco use, T2DM, Rheumatoid Arthritis, Essential Hypertension presents to [facility] ED from [facility] for Acute on Chronic weakness. As per patient, he has had LUE/LLE weakness since his diagnosis of lung cancer in March of this year. However lately in the last week it has been progressively more difficult for him to stand, walk or transfer from bed to chair because his lower legs are now both weak. Yesterday he had an appointment at Dr. Waterman's office and needed to be carried by his daughter and son in law to clinic. Because of the progressive weakness there was concern for cord compression, and patient was sent to the ED for evaluation.     In the ED patient underwent MRI of T-spine and L-spine. Also got Rocephin 1g IV x 1, and Percocet. Patient seen at bedside. Weakness he feels is getting worse, and complains of intermittent back pain. No fevers or chills (16 May 2019 11:26)      Past Medical History  Non-small cell lung cancer  Hyperlipidemia  Rheumatoid arthritis  Essential hypertension  Type 2 diabetes mellitus      Past Surgical History  S/P cholecystectomy      MEDICATIONS    acetaminophen   Tablet .. 650 milliGRAM(s) Oral every 6 hours PRN  ALBUTerol    90 MICROgram(s) HFA Inhaler 2 Puff(s) Inhalation every 6 hours PRN  aspirin enteric coated 81 milliGRAM(s) Oral daily  buDESOnide  80 MICROgram(s)/formoterol 4.5 MICROgram(s) Inhaler 2 Puff(s) Inhalation two times a day  dexamethasone  Injectable 4 milliGRAM(s) IV Push every 8 hours  dextrose 40% Gel 15 Gram(s) Oral once PRN  dextrose 5%. 1000 milliLiter(s) IV Continuous <Continuous>  dextrose 50% Injectable 12.5 Gram(s) IV Push once  dextrose 50% Injectable 25 Gram(s) IV Push once  dextrose 50% Injectable 25 Gram(s) IV Push once  enoxaparin Injectable 40 milliGRAM(s) SubCutaneous daily  gabapentin 200 milliGRAM(s) Oral at bedtime  glucagon  Injectable 1 milliGRAM(s) IntraMuscular once PRN  hydroxychloroquine 200 milliGRAM(s) Oral two times a day  insulin glargine Injectable (LANTUS) 14 Unit(s) SubCutaneous two times a day  insulin lispro (HumaLOG) corrective regimen sliding scale   SubCutaneous at bedtime  insulin lispro (HumaLOG) corrective regimen sliding scale   SubCutaneous three times a day before meals  insulin lispro Injectable (HumaLOG) 2 Unit(s) SubCutaneous before breakfast  insulin lispro Injectable (HumaLOG) 2 Unit(s) SubCutaneous before lunch  insulin lispro Injectable (HumaLOG) 2 Unit(s) SubCutaneous before dinner  levETIRAcetam 500 milliGRAM(s) Oral two times a day  losartan 25 milliGRAM(s) Oral daily  nystatin    Suspension [id number] Unit(s) Oral four times a day  oxyCODONE    IR 5 milliGRAM(s) Oral every 6 hours PRN  simvastatin 10 milliGRAM(s) Oral at bedtime  tamsulosin 0.4 milliGRAM(s) Oral at bedtime  tiotropium 18 MICROgram(s) Capsule 1 Capsule(s) Inhalation daily         Family history: No history of dementia, strokes, or seizures   FAMILY HISTORY:  FH: leukemia  FH: lung cancer    SOCIAL HISTORY -- No history of tobacco or alcohol use     Allergies    No Known Allergies    Intolerances            Vital Signs Last 24 Hrs  T(C): 36.8  HR: 72  BP: 128/68  RR: 20     On Neurological Examination:    Mental Status - Patient is alert, awake, oriented X3. .   Follows commands well and able to answer questions appropriately. Mood and affect  normal  Follow simple commands able to repeat  able to name.  Speech - Fluent no Dysarthria  no  Aphasia                              Cranial Nerves - Extraocular muscle intact  MARIAM Facial symmetry Tongue midline, CnV1to V3 intact gross hearing intact      Motor Exam -   Right upper  5/5 throughout  Left upper 4/5 throughtout  Right lower- 4/5 throughout  Left lower 4/5 throughout  Coordination -finger to nose intact  Muscle tone - is normal all over. No asymmetry is seen.      Sensory    Bilateral intact to light touch    Gait -  abole to take a few steps     GENERAL Exam:     Nontoxic , No Acute Distress   	  HEENT:  normocephalic, atraumatic  		  LUNGS:	Clear bilaterally  No Wheeze      VASCULAR: no carotid brui  	  HEART:	 Normal S1S2   No murmur RRR        	  MUSCULOSKELETAL: Normal Range of Motion  	   SKIN:      Normal   No Ecchymosis               LABS:  CBC Full  -  ( 18 May 2019 06:00 )  WBC Count : 8.00 K/uL  RBC Count : 5.00 M/uL  Hemoglobin : 13.8 g/dL  Hematocrit : 41.7 %  Platelet Count - Automated : 71 K/uL  Mean Cell Volume : 83.4 fL  Mean Cell Hemoglobin : 27.6 pg  Mean Cell Hemoglobin Concentration : 33.1 %  Auto Neutrophil # : 6.73 K/uL  Auto Lymphocyte # : 0.74 K/uL  Auto Monocyte # : 0.32 K/uL  Auto Eosinophil # : 0.00 K/uL  Auto Basophil # : 0.03 K/uL  Auto Neutrophil % : 84.0 %  Auto Lymphocyte % : 9.3 %  Auto Monocyte % : 4.0 %  Auto Eosinophil % : 0.0 %  Auto Basophil % : 0.4 %      05-18    136  |  103  |  32<H>  ----------------------------<  386<H>  4.7   |  22  |  0.88    Ca    8.7      18 May 2019 06:00  Phos  2.8     05-18  Mg     2.2     05-18    TPro  6.5  /  Alb  3.4  /  TBili  0.6  /  DBili  x   /  AST  15  /  ALT  26  /  AlkPhos  76  05-17    Hemoglobin A1C:     LIVER FUNCTIONS - ( 17 May 2019 07:30 )  Alb: 3.4 g/dL / Pro: 6.5 g/dL / ALK PHOS: 76 u/L / ALT: 26 u/L / AST: 15 u/L / GGT: x           Vitamin B12         RADIOLOGY    EKG                schoenberg     Assessment and Plan:   · Assessment		  73M hx of Metastatic NSCLC, Former tobacco use, T2DM, Rheumatoid Arthritis, Essential Hypertension presents to [facility] ED from [facility] for Acute on Chronic weakness. As per patient, he has had LUE/LLE weakness since his diagnosis of lung cancer in March of this year.  Mass in right frontal area increased     continue decadron     continue keppra for seizure prevention    poor prognosis    onc to advise         Electronic Signatures:  Schoenberg, Laura Gray (MD)

## 2019-05-19 NOTE — PROGRESS NOTE ADULT - ATTENDING COMMENTS
daughter Subhash [phone], [phone] d/w daughter Subhash [phone], [phone] re above, she will d/w Dr. Waterman and Dr. Estrada tomorrow re overall care

## 2019-05-19 NOTE — PROGRESS NOTE ADULT - PROBLEM SELECTOR PLAN 5
c/w sliding scale insulin. Check FS qid  on lantus 10 bid, FSBGs poorly controlled on higher dose steroids --> increase lantus to 14 bid, add premeal 2, increase to mod ssI, f/u FSBGs closely c/w sliding scale insulin. Check FS qid  on lantus 10 bid, FSBGs poorly controlled on higher dose steroids --> increase lantus to 20 bid, premeal 6, increase to mod ssI, f/u FSBGs closely

## 2019-05-19 NOTE — PROGRESS NOTE ADULT - PROBLEM SELECTOR PLAN 4
likely d/t underlying disease, f/u plts/H&H  DVT ppx once plts consistently >50K --> start lovenox ppx likely d/t underlying disease, f/u plts/H&H  DVT ppx plts consistently >50K --> c/w lovenox ppx

## 2019-05-19 NOTE — PROGRESS NOTE ADULT - PROBLEM SELECTOR PLAN 3
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  Per rads onc s/p 9/10 WBRT with Dr. Estrada at [facility], no SCC on imaging this admission, upon discharge he can be seen for re-evaluation by Dr. Estrada to determine if the last remaining fraction can be given, no role for inpt radiation  per onc - MRI brain shows worsening brain lesions- poor prognosis --> outpt onc f/u, consider PC eval  chest pain - ECG no acute changes, pain at site of tumor, likely from underlying tumor

## 2019-05-20 DIAGNOSIS — E11.8 TYPE 2 DIABETES MELLITUS WITH UNSPECIFIED COMPLICATIONS: ICD-10-CM

## 2019-05-20 DIAGNOSIS — I10 ESSENTIAL (PRIMARY) HYPERTENSION: ICD-10-CM

## 2019-05-20 LAB
ANION GAP SERPL CALC-SCNC: 15 MMO/L — HIGH (ref 7–14)
BASOPHILS # BLD AUTO: 0.05 K/UL — SIGNIFICANT CHANGE UP (ref 0–0.2)
BASOPHILS NFR BLD AUTO: 0.7 % — SIGNIFICANT CHANGE UP (ref 0–2)
BUN SERPL-MCNC: 30 MG/DL — HIGH (ref 7–23)
CALCIUM SERPL-MCNC: 9 MG/DL — SIGNIFICANT CHANGE UP (ref 8.4–10.5)
CHLORIDE SERPL-SCNC: 101 MMOL/L — SIGNIFICANT CHANGE UP (ref 98–107)
CO2 SERPL-SCNC: 20 MMOL/L — LOW (ref 22–31)
CREAT SERPL-MCNC: 0.79 MG/DL — SIGNIFICANT CHANGE UP (ref 0.5–1.3)
EOSINOPHIL # BLD AUTO: 0 K/UL — SIGNIFICANT CHANGE UP (ref 0–0.5)
EOSINOPHIL NFR BLD AUTO: 0 % — SIGNIFICANT CHANGE UP (ref 0–6)
GLUCOSE BLDC GLUCOMTR-MCNC: 200 MG/DL — HIGH (ref 70–99)
GLUCOSE BLDC GLUCOMTR-MCNC: 211 MG/DL — HIGH (ref 70–99)
GLUCOSE BLDC GLUCOMTR-MCNC: 329 MG/DL — HIGH (ref 70–99)
GLUCOSE BLDC GLUCOMTR-MCNC: 344 MG/DL — HIGH (ref 70–99)
GLUCOSE BLDC GLUCOMTR-MCNC: 359 MG/DL — HIGH (ref 70–99)
GLUCOSE BLDC GLUCOMTR-MCNC: 409 MG/DL — HIGH (ref 70–99)
GLUCOSE SERPL-MCNC: 408 MG/DL — HIGH (ref 70–99)
HCT VFR BLD CALC: 46.7 % — SIGNIFICANT CHANGE UP (ref 39–50)
HGB BLD-MCNC: 14.7 G/DL — SIGNIFICANT CHANGE UP (ref 13–17)
IMM GRANULOCYTES NFR BLD AUTO: 5.7 % — HIGH (ref 0–1.5)
LYMPHOCYTES # BLD AUTO: 0.92 K/UL — LOW (ref 1–3.3)
LYMPHOCYTES # BLD AUTO: 12.5 % — LOW (ref 13–44)
MAGNESIUM SERPL-MCNC: 2.2 MG/DL — SIGNIFICANT CHANGE UP (ref 1.6–2.6)
MANUAL SMEAR VERIFICATION: SIGNIFICANT CHANGE UP
MCHC RBC-ENTMCNC: 27 PG — SIGNIFICANT CHANGE UP (ref 27–34)
MCHC RBC-ENTMCNC: 31.5 % — LOW (ref 32–36)
MCV RBC AUTO: 85.7 FL — SIGNIFICANT CHANGE UP (ref 80–100)
MONOCYTES # BLD AUTO: 0.38 K/UL — SIGNIFICANT CHANGE UP (ref 0–0.9)
MONOCYTES NFR BLD AUTO: 5.2 % — SIGNIFICANT CHANGE UP (ref 2–14)
NEUTROPHILS # BLD AUTO: 5.6 K/UL — SIGNIFICANT CHANGE UP (ref 1.8–7.4)
NEUTROPHILS NFR BLD AUTO: 75.9 % — SIGNIFICANT CHANGE UP (ref 43–77)
NRBC # FLD: 0.04 K/UL — SIGNIFICANT CHANGE UP (ref 0–0)
PHOSPHATE SERPL-MCNC: 3 MG/DL — SIGNIFICANT CHANGE UP (ref 2.5–4.5)
PLATELET # BLD AUTO: 81 K/UL — LOW (ref 150–400)
PMV BLD: 13.8 FL — HIGH (ref 7–13)
POTASSIUM SERPL-MCNC: 4.7 MMOL/L — SIGNIFICANT CHANGE UP (ref 3.5–5.3)
POTASSIUM SERPL-SCNC: 4.7 MMOL/L — SIGNIFICANT CHANGE UP (ref 3.5–5.3)
RBC # BLD: 5.45 M/UL — SIGNIFICANT CHANGE UP (ref 4.2–5.8)
RBC # FLD: 15.3 % — HIGH (ref 10.3–14.5)
SODIUM SERPL-SCNC: 136 MMOL/L — SIGNIFICANT CHANGE UP (ref 135–145)
WBC # BLD: 7.37 K/UL — SIGNIFICANT CHANGE UP (ref 3.8–10.5)
WBC # FLD AUTO: 7.37 K/UL — SIGNIFICANT CHANGE UP (ref 3.8–10.5)

## 2019-05-20 PROCEDURE — 99233 SBSQ HOSP IP/OBS HIGH 50: CPT

## 2019-05-20 PROCEDURE — 99232 SBSQ HOSP IP/OBS MODERATE 35: CPT

## 2019-05-20 PROCEDURE — 99255 IP/OBS CONSLTJ NEW/EST HI 80: CPT

## 2019-05-20 RX ORDER — HUMAN INSULIN 100 [IU]/ML
15 INJECTION, SUSPENSION SUBCUTANEOUS ONCE
Refills: 0 | Status: COMPLETED | OUTPATIENT
Start: 2019-05-20 | End: 2019-05-20

## 2019-05-20 RX ORDER — INSULIN LISPRO 100/ML
14 VIAL (ML) SUBCUTANEOUS
Refills: 0 | Status: DISCONTINUED | OUTPATIENT
Start: 2019-05-20 | End: 2019-05-21

## 2019-05-20 RX ORDER — DEXAMETHASONE 0.5 MG/5ML
4 ELIXIR ORAL ONCE
Refills: 0 | Status: COMPLETED | OUTPATIENT
Start: 2019-05-20 | End: 2019-05-20

## 2019-05-20 RX ORDER — INSULIN GLARGINE 100 [IU]/ML
33 INJECTION, SOLUTION SUBCUTANEOUS EVERY MORNING
Refills: 0 | Status: DISCONTINUED | OUTPATIENT
Start: 2019-05-21 | End: 2019-05-22

## 2019-05-20 RX ORDER — DEXAMETHASONE 0.5 MG/5ML
ELIXIR ORAL
Refills: 0 | Status: DISCONTINUED | OUTPATIENT
Start: 2019-05-20 | End: 2019-05-22

## 2019-05-20 RX ADMIN — TAMSULOSIN HYDROCHLORIDE 0.4 MILLIGRAM(S): 0.4 CAPSULE ORAL at 21:29

## 2019-05-20 RX ADMIN — Medication 12: at 13:10

## 2019-05-20 RX ADMIN — Medication 200 MILLIGRAM(S): at 05:50

## 2019-05-20 RX ADMIN — GABAPENTIN 200 MILLIGRAM(S): 400 CAPSULE ORAL at 21:29

## 2019-05-20 RX ADMIN — Medication 2: at 16:27

## 2019-05-20 RX ADMIN — Medication 500000 UNIT(S): at 18:46

## 2019-05-20 RX ADMIN — Medication 4: at 22:34

## 2019-05-20 RX ADMIN — LEVETIRACETAM 500 MILLIGRAM(S): 250 TABLET, FILM COATED ORAL at 05:45

## 2019-05-20 RX ADMIN — BUDESONIDE AND FORMOTEROL FUMARATE DIHYDRATE 2 PUFF(S): 160; 4.5 AEROSOL RESPIRATORY (INHALATION) at 09:08

## 2019-05-20 RX ADMIN — Medication 200 MILLIGRAM(S): at 18:46

## 2019-05-20 RX ADMIN — LEVETIRACETAM 500 MILLIGRAM(S): 250 TABLET, FILM COATED ORAL at 18:46

## 2019-05-20 RX ADMIN — Medication 4 MILLIGRAM(S): at 14:30

## 2019-05-20 RX ADMIN — LOSARTAN POTASSIUM 25 MILLIGRAM(S): 100 TABLET, FILM COATED ORAL at 05:45

## 2019-05-20 RX ADMIN — ENOXAPARIN SODIUM 40 MILLIGRAM(S): 100 INJECTION SUBCUTANEOUS at 13:09

## 2019-05-20 RX ADMIN — Medication 10: at 09:08

## 2019-05-20 RX ADMIN — Medication 4 MILLIGRAM(S): at 22:29

## 2019-05-20 RX ADMIN — Medication 500000 UNIT(S): at 05:45

## 2019-05-20 RX ADMIN — Medication 500000 UNIT(S): at 13:09

## 2019-05-20 RX ADMIN — Medication 6 UNIT(S): at 13:10

## 2019-05-20 RX ADMIN — INSULIN GLARGINE 20 UNIT(S): 100 INJECTION, SOLUTION SUBCUTANEOUS at 09:08

## 2019-05-20 RX ADMIN — Medication 500000 UNIT(S): at 22:35

## 2019-05-20 RX ADMIN — Medication 4 MILLIGRAM(S): at 06:39

## 2019-05-20 RX ADMIN — Medication 81 MILLIGRAM(S): at 13:09

## 2019-05-20 RX ADMIN — BUDESONIDE AND FORMOTEROL FUMARATE DIHYDRATE 2 PUFF(S): 160; 4.5 AEROSOL RESPIRATORY (INHALATION) at 21:30

## 2019-05-20 RX ADMIN — HUMAN INSULIN 15 UNIT(S): 100 INJECTION, SUSPENSION SUBCUTANEOUS at 23:59

## 2019-05-20 RX ADMIN — TIOTROPIUM BROMIDE 1 CAPSULE(S): 18 CAPSULE ORAL; RESPIRATORY (INHALATION) at 09:07

## 2019-05-20 RX ADMIN — SIMVASTATIN 10 MILLIGRAM(S): 20 TABLET, FILM COATED ORAL at 21:29

## 2019-05-20 RX ADMIN — Medication 6 UNIT(S): at 09:07

## 2019-05-20 NOTE — CONSULT NOTE ADULT - ATTENDING COMMENTS
Darby Fierro MD  Pager [phone] ([facility])/ [phone] ([place]) [please provide 10 digit call back number]  Nights and weekends: [phone]  Please note that this patient may be followed by a different provider tomorrow. If no answer or after hours, please contact [phone].  For final dc reccomendations, please call [phone] or page the endocrine fellow on call.
as above

## 2019-05-20 NOTE — PROGRESS NOTE ADULT - SUBJECTIVE AND OBJECTIVE BOX
INTERVAL HPI/OVERNIGHT EVENTS:  Patient seen at bedside. Weakness has improved somewhat.       VITAL SIGNS:  T(F): 97.5 (05-20-19 @ 14:48)  HR: 57 (05-20-19 @ 14:48)  BP: 127/60 (05-20-19 @ 14:48)  RR: 19 (05-20-19 @ 14:48)  SpO2: 99% (05-20-19 @ 14:48)  Wt(kg): --    PHYSICAL EXAM:    Constitutional: NAD, resting in bed comfortably  Eyes: EOMI, sclera non-icteric  Neck: supple, no LAP  Respiratory: CTA b/l, good air entry b/l, no wheezing, rhonchi or crackles  Cardiovascular: RRR, normal S1S2, no M/R/G  Gastrointestinal: soft, NTND  Extremities: no edema  Neurological: AAOx3, non focal  Skin: Normal temperature    MEDICATIONS  (STANDING):  aspirin enteric coated 81 milliGRAM(s) Oral daily  buDESOnide  80 MICROgram(s)/formoterol 4.5 MICROgram(s) Inhaler 2 Puff(s) Inhalation two times a day  dexamethasone  Injectable 4 milliGRAM(s) IV Push every 8 hours  dextrose 5%. 1000 milliLiter(s) (50 mL/Hr) IV Continuous <Continuous>  dextrose 50% Injectable 12.5 Gram(s) IV Push once  dextrose 50% Injectable 25 Gram(s) IV Push once  dextrose 50% Injectable 25 Gram(s) IV Push once  enoxaparin Injectable 40 milliGRAM(s) SubCutaneous daily  gabapentin 200 milliGRAM(s) Oral at bedtime  hydroxychloroquine 200 milliGRAM(s) Oral two times a day  insulin glargine Injectable (LANTUS) 20 Unit(s) SubCutaneous two times a day  insulin lispro (HumaLOG) corrective regimen sliding scale   SubCutaneous at bedtime  insulin lispro (HumaLOG) corrective regimen sliding scale   SubCutaneous three times a day before meals  insulin lispro Injectable (HumaLOG) 6 Unit(s) SubCutaneous before breakfast  insulin lispro Injectable (HumaLOG) 6 Unit(s) SubCutaneous before lunch  insulin lispro Injectable (HumaLOG) 6 Unit(s) SubCutaneous before dinner  levETIRAcetam 500 milliGRAM(s) Oral two times a day  losartan 25 milliGRAM(s) Oral daily  nystatin    Suspension 540173 Unit(s) Oral four times a day  simvastatin 10 milliGRAM(s) Oral at bedtime  tamsulosin 0.4 milliGRAM(s) Oral at bedtime  tiotropium 18 MICROgram(s) Capsule 1 Capsule(s) Inhalation daily    MEDICATIONS  (PRN):  acetaminophen   Tablet .. 650 milliGRAM(s) Oral every 6 hours PRN Mild Pain (1 - 3)  ALBUTerol    90 MICROgram(s) HFA Inhaler 2 Puff(s) Inhalation every 6 hours PRN Shortness of Breath and/or Wheezing  dextrose 40% Gel 15 Gram(s) Oral once PRN Blood Glucose LESS THAN 70 milliGRAM(s)/deciliter  glucagon  Injectable 1 milliGRAM(s) IntraMuscular once PRN Glucose LESS THAN 70 milligrams/deciliter  oxyCODONE    IR 5 milliGRAM(s) Oral every 6 hours PRN moderate to severe pain      Allergies    No Known Allergies    Intolerances        LABS:                        14.7   7.37  )-----------( 81       ( 20 May 2019 05:54 )             46.7     05-20    136  |  101  |  30<H>  ----------------------------<  408<H>  4.7   |  20<L>  |  0.79    Ca    9.0      20 May 2019 05:54  Phos  3.0     05-20  Mg     2.2     05-20            RADIOLOGY & ADDITIONAL TESTS:  Studies reviewed.

## 2019-05-20 NOTE — PROGRESS NOTE ADULT - ASSESSMENT
73M with Metastatic NSCLC (adeno) with multiple brain mets, s/p 4 out of 5 sessions of whole brain RT, Rheumatoid Arthritis (was on MTX, stopped since 2 weeks ago), Essential Hypertension referred to ED from [facility] for Acute on Chronic weakness.   MRI T and L spine negative. MRI brain with progression of frontal lobe mass vs RT effect.   Thrombocytopenia improved.     -Appreciate rad onc, rheum and neurology input  -Decrease dexamethasone to 2mg bid  -FSG checks and protonix while on steroids  -Monitor plt count  -Nystatin swish and swallow for oral thrush  -Supportive care, pain control, Nutrition, PT, DVT ppx.   -May benefit from dc to rehab  -Outpatient oncology f/u    Will follow. Please do not hesitate to call back with questions.     Maribeth Waterman MD  Medical Oncology Attending

## 2019-05-20 NOTE — PROVIDER CONTACT NOTE (OTHER) - ASSESSMENT
Pt coughing- sputum noted to have small bright red blood clot. VSS, no s/s of any further bleeding.
Asymptomatic

## 2019-05-20 NOTE — CONSULT NOTE ADULT - ASSESSMENT
73M hx of Metastatic NSCLC, Former tobacco use, T2DM, Rheumatoid Arthritis, Essential Hypertension presents to [facility] ED from [facility] for Acute on Chronic weakness. As per patient, he has had LUE/LLE weakness, now on high dose steroids with marked steroid induced hyperglycemia   A1C 10.7      #diabetes  goal glucose 140-180. avoid hypoglycemia   check FSBG TID qac and hs  carb consistent diet   5/20 plan:  - no need for split dose lantus for now, rather would make Lantus dosing once a day for ease of administration and titration   -would switch to NPH tonight for a bridge overnight, therefore dose NPH 15 tonight instead of Lantus (check 3 am glucose)  tomorrow am please increase Lantus to 35 units in the morning   -Humalog 14-14-14 before meals if he eats 50% or more of the meal. HOLD IF NPO  -high dose ISS premeals and bedtime   -RDE consult to help with meal planning (high carb diet at home).     inform endocrine of hypoglycemia or persistent hyperglycemia episodes as changes in pts insulin regimen will need to be made.   notify endocrine if any plans to be NPO/diet changes as this will also affect insulin regimen.  INFORM ENDOCRINE if any plan for steroid taper       DC regimen:   inform endocrine of dc planning   TBD based on outpt plan for steroids and insulin requirments inpt  likely basal bolus vs Basal +metformin   outpt followup with PMD   If patient wishes to follow up with [facility] Endocrinology     [facility]  [address] [place] [address], [address], [place] [address]  [phone]   Please call to schedule appointment with CDE/Nutritionist and MD appointment next available     #HTN  goal BP in DM, <130/80, management as per primary team   currently on losartan 73M hx of Metastatic NSCLC, Former tobacco use, T2DM, Rheumatoid Arthritis, Essential Hypertension presents to [facility] ED from [facility] for Acute on Chronic weakness. As per patient, he has had LUE/LLE weakness, now on high dose steroids with marked steroid induced hyperglycemia   A1C 10.7      #diabetes  goal glucose 140-180. avoid hypoglycemia   check FSBG TID qac and hs  carb consistent diet   5/20 plan:  pt has recieved close to 70units in past 24 hrs and with hyperglycemia, pt needs more bolus>basal with steroids. Will aim for TDD of 80 units.   - no need for split dose lantus for now, rather would make Lantus dosing once a day for ease of administration and titration   -would switch to NPH tonight for a bridge overnight, therefore dose NPH 15 tonight instead of Lantus (check 3 am glucose)  tomorrow am please increase Lantus to 35 units in the morning   -Humalog 14-14-14 before meals if he eats 50% or more of the meal. HOLD IF NPO  -high dose ISS premeals and bedtime   -RDE consult to help with meal planning (high carb diet at home).     inform endocrine of hypoglycemia or persistent hyperglycemia episodes as changes in pts insulin regimen will need to be made.   notify endocrine if any plans to be NPO/diet changes as this will also affect insulin regimen.  INFORM ENDOCRINE if any plan for steroid taper       DC regimen:   inform endocrine of dc planning   TBD based on outpt plan for steroids and insulin requirments inpt  likely basal bolus vs Basal +metformin   outpt followup with PMD   If patient wishes to follow up with [facility] Endocrinology     [facility]  [address] [place] [address], [address], [place] [address]  [phone]   Please call to schedule appointment with CDE/Nutritionist and MD appointment next available     #HTN  goal BP in DM, <130/80, management as per primary team   currently on losartan

## 2019-05-20 NOTE — PROGRESS NOTE ADULT - PROBLEM SELECTOR PLAN 3
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  Per rads onc s/p 9/10 WBRT with Dr. Estrada at [facility], no SCC on imaging this admission, upon discharge he can be seen for re-evaluation by Dr. Estrada to determine if the last remaining fraction can be given, no role for inpt radiation  per onc - MRI brain shows worsening brain lesions- poor prognosis --> outpt onc f/u, consider PC eval  chest pain - ECG no acute changes, pain at site of tumor, likely from underlying tumor

## 2019-05-20 NOTE — PROGRESS NOTE ADULT - SUBJECTIVE AND OBJECTIVE BOX
Dr. Horowitz pager [phone]    Patient is a 73y old  Male who presents with a chief complaint of B/L LE weakness (20 May 2019 15:54)      SUBJECTIVE / OVERNIGHT EVENTS: pt seen at 140p w/ dgtr at bedside- still w/ weakness in legs  dizziness better    MEDICATIONS  (STANDING):  aspirin enteric coated 81 milliGRAM(s) Oral daily  buDESOnide  80 MICROgram(s)/formoterol 4.5 MICROgram(s) Inhaler 2 Puff(s) Inhalation two times a day  dexamethasone  Injectable 4 milliGRAM(s) IV Push every 8 hours  dextrose 5%. 1000 milliLiter(s) (50 mL/Hr) IV Continuous <Continuous>  dextrose 50% Injectable 12.5 Gram(s) IV Push once  dextrose 50% Injectable 25 Gram(s) IV Push once  dextrose 50% Injectable 25 Gram(s) IV Push once  enoxaparin Injectable 40 milliGRAM(s) SubCutaneous daily  gabapentin 200 milliGRAM(s) Oral at bedtime  hydroxychloroquine 200 milliGRAM(s) Oral two times a day  insulin glargine Injectable (LANTUS) 20 Unit(s) SubCutaneous two times a day  insulin lispro (HumaLOG) corrective regimen sliding scale   SubCutaneous at bedtime  insulin lispro (HumaLOG) corrective regimen sliding scale   SubCutaneous three times a day before meals  insulin lispro Injectable (HumaLOG) 6 Unit(s) SubCutaneous before breakfast  insulin lispro Injectable (HumaLOG) 6 Unit(s) SubCutaneous before lunch  insulin lispro Injectable (HumaLOG) 6 Unit(s) SubCutaneous before dinner  levETIRAcetam 500 milliGRAM(s) Oral two times a day  losartan 25 milliGRAM(s) Oral daily  nystatin    Suspension 452150 Unit(s) Oral four times a day  simvastatin 10 milliGRAM(s) Oral at bedtime  tamsulosin 0.4 milliGRAM(s) Oral at bedtime  tiotropium 18 MICROgram(s) Capsule 1 Capsule(s) Inhalation daily    MEDICATIONS  (PRN):  acetaminophen   Tablet .. 650 milliGRAM(s) Oral every 6 hours PRN Mild Pain (1 - 3)  ALBUTerol    90 MICROgram(s) HFA Inhaler 2 Puff(s) Inhalation every 6 hours PRN Shortness of Breath and/or Wheezing  dextrose 40% Gel 15 Gram(s) Oral once PRN Blood Glucose LESS THAN 70 milliGRAM(s)/deciliter  glucagon  Injectable 1 milliGRAM(s) IntraMuscular once PRN Glucose LESS THAN 70 milligrams/deciliter  oxyCODONE    IR 5 milliGRAM(s) Oral every 6 hours PRN moderate to severe pain      Meds ordered within last 24hours  ([place]):   Qty Removed: 1 each (05-20 @ 15:29)      T(C): 36.4 (05-20-19 @ 14:48), Max: 36.7 (05-19-19 @ 22:05)  HR: 57 (05-20-19 @ 14:48) (57 - 88)  BP: 127/60 (05-20-19 @ 14:48) (123/67 - 128/75)  RR: 19 (05-20-19 @ 14:48) (18 - 19)  SpO2: 99% (05-20-19 @ 14:48) (97% - 99%)    CAPILLARY BLOOD GLUCOSE      POCT Blood Glucose.: 200 mg/dL (20 May 2019 16:11)  POCT Blood Glucose.: 409 mg/dL (20 May 2019 12:50)  POCT Blood Glucose.: 359 mg/dL (20 May 2019 08:51)  POCT Blood Glucose.: 245 mg/dL (19 May 2019 22:14)  POCT Blood Glucose.: 238 mg/dL (19 May 2019 18:03)    I&O's Summary      PHYSICAL EXAM:  GENERAL: NAD  CHEST/LUNG: Clear to auscultation bilaterally; No wheeze  HEART: Regular rate and rhythm; No murmurs, rubs, or gallops  ABDOMEN: Soft, Nontender, Nondistended; Bowel sounds present  EXTREMITIES:  No clubbing, cyanosis, or edema  NEURO: UE 5/5 LLE 3/5 RLE 5-/5      LABS:                        14.7   7.37  )-----------( 81       ( 20 May 2019 05:54 )             46.7     05-20    136  |  101  |  30<H>  ----------------------------<  408<H>  4.7   |  20<L>  |  0.79    Ca    9.0      20 May 2019 05:54  Phos  3.0     05-20  Mg     2.2     05-20                RADIOLOGY & ADDITIONAL TESTS:    Imaging Personally Reviewed:    Consultant(s) Notes Reviewed:      Care Discussed with Consultants/Other Providers:

## 2019-05-20 NOTE — CONSULT NOTE ADULT - SUBJECTIVE AND OBJECTIVE BOX
Reason For Consult:     HPI:  73M hx of Metastatic NSCLC, Former tobacco use, T2DM, Rheumatoid Arthritis, Essential Hypertension presents to [facility] ED from [facility] for Acute on Chronic weakness. As per patient, he has had LUE/LLE weakness since his diagnosis of lung cancer in March of this year. However lately in the last week it has been progressively more difficult for him to stand, walk or transfer from bed to chair because his lower legs are now both weak. Yesterday he had an appointment at Dr. Waterman's office and needed to be carried by his daughter and son in law to clinic. Because of the progressive weakness there was concern for cord compression, and patient was sent to the ED for evaluation.     In the ED patient underwent MRI of T-spine and L-spine. Also got Rocephin 1g IV x 1, and Percocet. Patient seen at bedside. Weakness he feels is getting worse, and complains of intermittent back pain. No fevers or chills (16 May 2019 11:26)      endocrine called for further assistance  regarding DM, pt lives with darrick who assists with DM care and insulin injection  Currently on lantus 10 at night and metformin 500mg BID   sugars at home 200s. diet is high in carbs with south asian diet of rice and roti  On dex 2mg at home  no family hx of DM per the daughter  pt states he is constipated, is eating well.  hx provided by daughter at bedside.    Inpt, pt started on higher dose dex and blood glucose in the 200-- now 300-400s   at admission glucose was 244 with co2 25 and AG 12   may 17th glucose in the 310-386  A1C 10.7        PAST MEDICAL & SURGICAL HISTORY:  Non-small cell lung cancer  Hyperlipidemia  Rheumatoid arthritis  Essential hypertension  Type 2 diabetes mellitus  S/P cholecystectomy      FAMILY HISTORY:  FH: leukemia  FH: lung cancer      Social History:  lives with daughter     Outpatient Medications:  Home Medications:   * Patient Currently Takes Medications as of 16-May-2019 12:35 documented in Structured Notes  · 	levETIRAcetam 500 mg oral tablet: 1 tab(s) orally every 12 hours, Last Dose Taken:    · 	Lantus 100 units/mL subcutaneous solution: 10 unit(s) subcutaneous 2 times a day, Last Dose Taken:    · 	simvastatin 10 mg oral tablet: 1 tab(s) orally once a day (at bedtime), Last Dose Taken:    · 	gabapentin 100 mg oral capsule: 2 cap(s) orally once a day (at bedtime), Last Dose Taken:    · 	metFORMIN 500 mg oral tablet: 1 tab(s) orally 2 times a day, Last Dose Taken:    · 	fluticasone 50 mcg/inh nasal spray: 1 spray(s) nasal once a day, Last Dose Taken:    · 	Ventolin HFA 90 mcg/inh inhalation aerosol: 2 puff(s) inhaled every 4 hours, As Needed, Last Dose Taken:    · 	methotrexate 2.5 mg oral tablet: 6 tab(s) orally once a week on Friday , Last Dose Taken:    · 	hydroxychloroquine 200 mg oral tablet: 1 tab(s) orally 2 times a day, Last Dose Taken:    · 	Symbicort 80 mcg-4.5 mcg/inh inhalation aerosol: 2 puff(s) inhaled 2 times a day, Last Dose Taken:    · 	tamsulosin 0.4 mg oral capsule: 1 cap(s) orally once a day, Last Dose Taken:    · 	zafirlukast 10 mg oral tablet: 1 tab(s) orally 2 times a day, Last Dose Taken:    · 	Spiriva 18 mcg inhalation capsule: 1 cap(s) inhaled once a day, Last Dose Taken:    · 	folic acid 1 mg oral tablet: 1 tab(s) orally once a day, Last Dose Taken:    · 	Calcium 500+D: 1 tab(s) orally 2 times a day, Last Dose Taken:    · 	raNITIdine 150 mg oral tablet: 1 tab(s) orally 2 times a day, Last Dose Taken:    · 	aspirin 81 mg oral tablet: 1 tab(s) orally once a day, Last Dose Taken:    · 	dexamethasone 2 mg oral tablet: 1 tab(s) orally 2 times a day, Last Dose Taken:    · 	losartan 25 mg oral tablet: 1 tab(s) orally once a day, Last Dose Taken:      MEDICATIONS  (STANDING):  aspirin enteric coated 81 milliGRAM(s) Oral daily  buDESOnide  80 MICROgram(s)/formoterol 4.5 MICROgram(s) Inhaler 2 Puff(s) Inhalation two times a day  dexamethasone  Injectable 4 milliGRAM(s) IV Push every 8 hours  dextrose 5%. 1000 milliLiter(s) (50 mL/Hr) IV Continuous <Continuous>  dextrose 50% Injectable 12.5 Gram(s) IV Push once  dextrose 50% Injectable 25 Gram(s) IV Push once  dextrose 50% Injectable 25 Gram(s) IV Push once  enoxaparin Injectable 40 milliGRAM(s) SubCutaneous daily  gabapentin 200 milliGRAM(s) Oral at bedtime  hydroxychloroquine 200 milliGRAM(s) Oral two times a day  insulin glargine Injectable (LANTUS) 20 Unit(s) SubCutaneous two times a day  insulin lispro (HumaLOG) corrective regimen sliding scale   SubCutaneous at bedtime  insulin lispro (HumaLOG) corrective regimen sliding scale   SubCutaneous three times a day before meals  insulin lispro Injectable (HumaLOG) 6 Unit(s) SubCutaneous before breakfast  insulin lispro Injectable (HumaLOG) 6 Unit(s) SubCutaneous before lunch  insulin lispro Injectable (HumaLOG) 6 Unit(s) SubCutaneous before dinner  levETIRAcetam 500 milliGRAM(s) Oral two times a day  losartan 25 milliGRAM(s) Oral daily  nystatin    Suspension 491504 Unit(s) Oral four times a day  simvastatin 10 milliGRAM(s) Oral at bedtime  tamsulosin 0.4 milliGRAM(s) Oral at bedtime  tiotropium 18 MICROgram(s) Capsule 1 Capsule(s) Inhalation daily    MEDICATIONS  (PRN):  acetaminophen   Tablet .. 650 milliGRAM(s) Oral every 6 hours PRN Mild Pain (1 - 3)  ALBUTerol    90 MICROgram(s) HFA Inhaler 2 Puff(s) Inhalation every 6 hours PRN Shortness of Breath and/or Wheezing  dextrose 40% Gel 15 Gram(s) Oral once PRN Blood Glucose LESS THAN 70 milliGRAM(s)/deciliter  glucagon  Injectable 1 milliGRAM(s) IntraMuscular once PRN Glucose LESS THAN 70 milligrams/deciliter  oxyCODONE    IR 5 milliGRAM(s) Oral every 6 hours PRN moderate to severe pain      Allergies    No Known Allergies    Intolerances      Review of Systems:  Constitutional: No fever  Eyes: No blurry vision  Neuro: No tremors  HEENT: No pain  Cardiovascular: No chest pain, palpitations  Respiratory: No SOB, no cough  GI: No nausea, vomiting, + abdominal pain in lower qudarant   : No dysuria  Skin: no rash  Psych: no depression  Endocrine: no polyuria, polydipsia  Hem/lymph: no swelling      ALL OTHER SYSTEMS REVIEWED AND NEGATIVE    UNABLE TO OBTAIN    PHYSICAL EXAM:  VITALS: T(C): 36.4 (05-20-19 @ 14:48)  T(F): 97.5 (05-20-19 @ 14:48), Max: 98 (05-19-19 @ 22:05)  HR: 57 (05-20-19 @ 14:48) (57 - 88)  BP: 127/60 (05-20-19 @ 14:48) (123/67 - 128/75)  RR:  (18 - 19)  SpO2:  (97% - 99%)  Wt(kg): --  GENERAL: elderly frail appearing male   EYES: No proptosis, no lid lag, anicteric  HEENT:  Atraumatic, Normocephalic, dry mucous membranes  RESPIRATORY: Clear to auscultation bilaterally; No rales, rhonchi, wheezing, or rubs  CARDIOVASCULAR: Regular rate and rhythm; No murmurs; no peripheral edema  GI: Soft, nontender, non distended, normal bowel sounds  SKIN: Dry, intact, No rashes or lesions  MUSCULOSKELETAL: moving UE bl   NEUROt, extraocular movements intact, no tremor,   PSYCH: Alert and oriented x 3,      POCT Blood Glucose.: 200 mg/dL (05-20-19 @ 16:11)  POCT Blood Glucose.: 409 mg/dL (05-20-19 @ 12:50)  POCT Blood Glucose.: 359 mg/dL (05-20-19 @ 08:51)  POCT Blood Glucose.: 245 mg/dL (05-19-19 @ 22:14)  POCT Blood Glucose.: 238 mg/dL (05-19-19 @ 18:03)  POCT Blood Glucose.: 310 mg/dL (05-19-19 @ 12:29)  POCT Blood Glucose.: 356 mg/dL (05-19-19 @ 08:49)  POCT Blood Glucose.: 314 mg/dL (05-18-19 @ 22:20)  POCT Blood Glucose.: 353 mg/dL (05-18-19 @ 17:51)  POCT Blood Glucose.: 425 mg/dL (05-18-19 @ 12:26)  POCT Blood Glucose.: 348 mg/dL (05-18-19 @ 08:45)  POCT Blood Glucose.: 372 mg/dL (05-17-19 @ 21:25)  POCT Blood Glucose.: 371 mg/dL (05-17-19 @ 17:54)                            14.7   7.37  )-----------( 81       ( 20 May 2019 05:54 )             46.7       05-20    136  |  101  |  30<H>  ----------------------------<  408<H>  4.7   |  20<L>  |  0.79    EGFR if : 102  EGFR if non : 88    Ca    9.0      05-20  Mg     2.2     05-20  Phos  3.0     05-20        Thyroid Function Tests:      Hemoglobin A1C, Whole Blood: 10.7 % <H> [4.0 - 5.6] (05-17-19 @ 06:30)          Radiology:

## 2019-05-21 ENCOUNTER — TRANSCRIPTION ENCOUNTER (OUTPATIENT)
Age: 74
End: 2019-05-21

## 2019-05-21 DIAGNOSIS — R53.81 OTHER MALAISE: ICD-10-CM

## 2019-05-21 LAB
ANION GAP SERPL CALC-SCNC: 12 MMO/L — SIGNIFICANT CHANGE UP (ref 7–14)
BASOPHILS # BLD AUTO: 0.09 K/UL — SIGNIFICANT CHANGE UP (ref 0–0.2)
BASOPHILS NFR BLD AUTO: 1.3 % — SIGNIFICANT CHANGE UP (ref 0–2)
BUN SERPL-MCNC: 33 MG/DL — HIGH (ref 7–23)
CALCIUM SERPL-MCNC: 8.1 MG/DL — LOW (ref 8.4–10.5)
CHLORIDE SERPL-SCNC: 102 MMOL/L — SIGNIFICANT CHANGE UP (ref 98–107)
CO2 SERPL-SCNC: 20 MMOL/L — LOW (ref 22–31)
CREAT SERPL-MCNC: 0.88 MG/DL — SIGNIFICANT CHANGE UP (ref 0.5–1.3)
EOSINOPHIL # BLD AUTO: 0 K/UL — SIGNIFICANT CHANGE UP (ref 0–0.5)
EOSINOPHIL NFR BLD AUTO: 0 % — SIGNIFICANT CHANGE UP (ref 0–6)
GLUCOSE BLDC GLUCOMTR-MCNC: 152 MG/DL — HIGH (ref 70–99)
GLUCOSE BLDC GLUCOMTR-MCNC: 261 MG/DL — HIGH (ref 70–99)
GLUCOSE BLDC GLUCOMTR-MCNC: 296 MG/DL — HIGH (ref 70–99)
GLUCOSE BLDC GLUCOMTR-MCNC: 72 MG/DL — SIGNIFICANT CHANGE UP (ref 70–99)
GLUCOSE BLDC GLUCOMTR-MCNC: 73 MG/DL — SIGNIFICANT CHANGE UP (ref 70–99)
GLUCOSE BLDC GLUCOMTR-MCNC: 78 MG/DL — SIGNIFICANT CHANGE UP (ref 70–99)
GLUCOSE SERPL-MCNC: 462 MG/DL — CRITICAL HIGH (ref 70–99)
HCT VFR BLD CALC: 42.7 % — SIGNIFICANT CHANGE UP (ref 39–50)
HGB BLD-MCNC: 13.7 G/DL — SIGNIFICANT CHANGE UP (ref 13–17)
IMM GRANULOCYTES NFR BLD AUTO: 8.8 % — HIGH (ref 0–1.5)
LYMPHOCYTES # BLD AUTO: 0.76 K/UL — LOW (ref 1–3.3)
LYMPHOCYTES # BLD AUTO: 11.2 % — LOW (ref 13–44)
MAGNESIUM SERPL-MCNC: 2 MG/DL — SIGNIFICANT CHANGE UP (ref 1.6–2.6)
MCHC RBC-ENTMCNC: 27.5 PG — SIGNIFICANT CHANGE UP (ref 27–34)
MCHC RBC-ENTMCNC: 32.1 % — SIGNIFICANT CHANGE UP (ref 32–36)
MCV RBC AUTO: 85.6 FL — SIGNIFICANT CHANGE UP (ref 80–100)
MONOCYTES # BLD AUTO: 0.36 K/UL — SIGNIFICANT CHANGE UP (ref 0–0.9)
MONOCYTES NFR BLD AUTO: 5.3 % — SIGNIFICANT CHANGE UP (ref 2–14)
NEUTROPHILS # BLD AUTO: 5 K/UL — SIGNIFICANT CHANGE UP (ref 1.8–7.4)
NEUTROPHILS NFR BLD AUTO: 73.4 % — SIGNIFICANT CHANGE UP (ref 43–77)
NRBC # FLD: 0.05 K/UL — SIGNIFICANT CHANGE UP (ref 0–0)
PHOSPHATE SERPL-MCNC: 2.6 MG/DL — SIGNIFICANT CHANGE UP (ref 2.5–4.5)
PLATELET # BLD AUTO: 72 K/UL — LOW (ref 150–400)
PMV BLD: 12.1 FL — SIGNIFICANT CHANGE UP (ref 7–13)
POTASSIUM SERPL-MCNC: 4.6 MMOL/L — SIGNIFICANT CHANGE UP (ref 3.5–5.3)
POTASSIUM SERPL-SCNC: 4.6 MMOL/L — SIGNIFICANT CHANGE UP (ref 3.5–5.3)
RBC # BLD: 4.99 M/UL — SIGNIFICANT CHANGE UP (ref 4.2–5.8)
RBC # FLD: 15.4 % — HIGH (ref 10.3–14.5)
SODIUM SERPL-SCNC: 134 MMOL/L — LOW (ref 135–145)
WBC # BLD: 6.81 K/UL — SIGNIFICANT CHANGE UP (ref 3.8–10.5)
WBC # FLD AUTO: 6.81 K/UL — SIGNIFICANT CHANGE UP (ref 3.8–10.5)

## 2019-05-21 PROCEDURE — 99232 SBSQ HOSP IP/OBS MODERATE 35: CPT

## 2019-05-21 PROCEDURE — 74018 RADEX ABDOMEN 1 VIEW: CPT | Mod: 26

## 2019-05-21 RX ORDER — PANTOPRAZOLE SODIUM 20 MG/1
40 TABLET, DELAYED RELEASE ORAL
Refills: 0 | Status: DISCONTINUED | OUTPATIENT
Start: 2019-05-22 | End: 2019-05-22

## 2019-05-21 RX ORDER — INSULIN LISPRO 100/ML
11 VIAL (ML) SUBCUTANEOUS
Refills: 0 | Status: DISCONTINUED | OUTPATIENT
Start: 2019-05-21 | End: 2019-05-22

## 2019-05-21 RX ORDER — PANTOPRAZOLE SODIUM 20 MG/1
40 TABLET, DELAYED RELEASE ORAL ONCE
Refills: 0 | Status: COMPLETED | OUTPATIENT
Start: 2019-05-21 | End: 2019-05-21

## 2019-05-21 RX ORDER — DEXAMETHASONE 0.5 MG/5ML
4 ELIXIR ORAL
Refills: 0 | Status: DISCONTINUED | OUTPATIENT
Start: 2019-05-20 | End: 2019-05-22

## 2019-05-21 RX ORDER — DEXAMETHASONE 0.5 MG/5ML
2 ELIXIR ORAL DAILY
Refills: 0 | Status: CANCELLED | OUTPATIENT
Start: 2019-05-26 | End: 2019-05-22

## 2019-05-21 RX ORDER — DEXAMETHASONE 0.5 MG/5ML
2 ELIXIR ORAL
Refills: 0 | Status: DISCONTINUED | OUTPATIENT
Start: 2019-05-23 | End: 2019-05-22

## 2019-05-21 RX ADMIN — Medication 81 MILLIGRAM(S): at 12:32

## 2019-05-21 RX ADMIN — Medication 14 UNIT(S): at 09:34

## 2019-05-21 RX ADMIN — Medication 500000 UNIT(S): at 18:45

## 2019-05-21 RX ADMIN — BUDESONIDE AND FORMOTEROL FUMARATE DIHYDRATE 2 PUFF(S): 160; 4.5 AEROSOL RESPIRATORY (INHALATION) at 21:07

## 2019-05-21 RX ADMIN — BUDESONIDE AND FORMOTEROL FUMARATE DIHYDRATE 2 PUFF(S): 160; 4.5 AEROSOL RESPIRATORY (INHALATION) at 09:16

## 2019-05-21 RX ADMIN — Medication 500000 UNIT(S): at 12:32

## 2019-05-21 RX ADMIN — Medication 500000 UNIT(S): at 05:13

## 2019-05-21 RX ADMIN — Medication 2: at 22:36

## 2019-05-21 RX ADMIN — SIMVASTATIN 10 MILLIGRAM(S): 20 TABLET, FILM COATED ORAL at 21:07

## 2019-05-21 RX ADMIN — Medication 30 MILLILITER(S): at 12:32

## 2019-05-21 RX ADMIN — GABAPENTIN 200 MILLIGRAM(S): 400 CAPSULE ORAL at 21:07

## 2019-05-21 RX ADMIN — Medication 200 MILLIGRAM(S): at 05:13

## 2019-05-21 RX ADMIN — LEVETIRACETAM 500 MILLIGRAM(S): 250 TABLET, FILM COATED ORAL at 05:13

## 2019-05-21 RX ADMIN — TAMSULOSIN HYDROCHLORIDE 0.4 MILLIGRAM(S): 0.4 CAPSULE ORAL at 21:07

## 2019-05-21 RX ADMIN — Medication 4 MILLIGRAM(S): at 18:45

## 2019-05-21 RX ADMIN — LOSARTAN POTASSIUM 25 MILLIGRAM(S): 100 TABLET, FILM COATED ORAL at 05:13

## 2019-05-21 RX ADMIN — Medication 200 MILLIGRAM(S): at 18:45

## 2019-05-21 RX ADMIN — Medication 6: at 09:33

## 2019-05-21 RX ADMIN — INSULIN GLARGINE 33 UNIT(S): 100 INJECTION, SOLUTION SUBCUTANEOUS at 09:33

## 2019-05-21 RX ADMIN — TIOTROPIUM BROMIDE 1 CAPSULE(S): 18 CAPSULE ORAL; RESPIRATORY (INHALATION) at 09:16

## 2019-05-21 RX ADMIN — ENOXAPARIN SODIUM 40 MILLIGRAM(S): 100 INJECTION SUBCUTANEOUS at 12:32

## 2019-05-21 RX ADMIN — PANTOPRAZOLE SODIUM 40 MILLIGRAM(S): 20 TABLET, DELAYED RELEASE ORAL at 15:21

## 2019-05-21 RX ADMIN — LEVETIRACETAM 500 MILLIGRAM(S): 250 TABLET, FILM COATED ORAL at 18:44

## 2019-05-21 NOTE — PROVIDER CONTACT NOTE (OTHER) - ACTION/TREATMENT ORDERED:
JOSE Gilmore notified and assessed pt. No further orders at this time. Will continue to monitor.
Hold insulin. Pt. to eat lunch. Will continue to monitor.
Will give insulin per sliding scale and reevaluate blood sugar.

## 2019-05-21 NOTE — DISCHARGE NOTE PROVIDER - NSDCFUADDAPPT_GEN_ALL_CORE_FT
[facility]  [address], [address], [place] [address]  [phone]

## 2019-05-21 NOTE — DISCHARGE NOTE PROVIDER - NSDCCPCAREPLAN_GEN_ALL_CORE_FT
PRINCIPAL DISCHARGE DIAGNOSIS  Diagnosis: Weakness of both lower extremities  Assessment and Plan of Treatment: continue with physical therapy      SECONDARY DISCHARGE DIAGNOSES  Diagnosis: Lung cancer metastatic to brain  Assessment and Plan of Treatment: follow up with Oncology at [facility]. Call to schedule an appointment    Diagnosis: Hyperlipidemia  Assessment and Plan of Treatment: continue Simvastatin    Diagnosis: Hypertension, unspecified type  Assessment and Plan of Treatment: continue Losartan    Diagnosis: Type 2 diabetes mellitus with complication, with long-term current use of insulin  Assessment and Plan of Treatment: continue Insulin as prescribed  Follow up with your Primary care provider or at [facility]    Diagnosis: Oral thrush  Assessment and Plan of Treatment: continue Nystatin    Diagnosis: Thrombocytopenia  Assessment and Plan of Treatment: follow up with Oncology    Diagnosis: Back pain  Assessment and Plan of Treatment: continue pain medication as ordered PRINCIPAL DISCHARGE DIAGNOSIS  Diagnosis: Weakness of both lower extremities  Assessment and Plan of Treatment: continue with physical therapy      SECONDARY DISCHARGE DIAGNOSES  Diagnosis: Lung cancer metastatic to brain  Assessment and Plan of Treatment: follow up with Oncology Dr. Waterman at [facility]. Call to schedule an appointment    Diagnosis: Hyperlipidemia  Assessment and Plan of Treatment: continue Simvastatin    Diagnosis: Hypertension, unspecified type  Assessment and Plan of Treatment: continue Losartan    Diagnosis: Type 2 diabetes mellitus with complication, with long-term current use of insulin  Assessment and Plan of Treatment: continue Insulin as prescribed  Follow up with your Primary care provider or at [facility]    Diagnosis: Oral thrush  Assessment and Plan of Treatment: continue Nystatin    Diagnosis: Thrombocytopenia  Assessment and Plan of Treatment: follow up with Oncology    Diagnosis: Back pain  Assessment and Plan of Treatment: continue pain medication as ordered PRINCIPAL DISCHARGE DIAGNOSIS  Diagnosis: Weakness of both lower extremities  Assessment and Plan of Treatment: A MRI of the brain was obtained which showed: Interval increased size of intracranial metastatic foci. Neurology team was consulted. Weakness of your lower extremities likely due to brain metastatic foci. Continue Keppra as prescribed. Please follow up with your oncologist and neurologist as outpatient. Continue your medications as directed and please follow-up as an outpatient with your primary care provider for further care and recommendations.      SECONDARY DISCHARGE DIAGNOSES  Diagnosis: Lung cancer metastatic to brain  Assessment and Plan of Treatment: Follow up with Oncology Dr. Waterman at [facility]. Call to schedule an appointment. Please also follow up with Dr. Estrada as outpatient. Please call to make an appointment. Continue your medications as directed and please follow-up as an outpatient with your primary care provider for further care and recommendations.    Diagnosis: Back pain  Assessment and Plan of Treatment: Continue pain medication as ordered. Continue your medications as directed and please follow-up as an outpatient with your primary care provider for further care and recommendations.    Diagnosis: Type 2 diabetes mellitus with complication, with long-term current use of insulin  Assessment and Plan of Treatment: You were seen by the Endocrinology team during your hospitalization. As you are on a dexamethasone taper your insulin will need to be tapered as well. Please follow the following instructions:   - When taking dexamethaosne 4 mg po BID, continue with Lantus solsotar pen 33units in the morning and humalog 11 units three times a day before meals.   - When taking dexamethasone 2 mg po BID, Administer lantus 25 units in the morning and hunalog 8 units three times a day before meals.   - When taking dexamethasone 2 mg daily,  Adminsiter lantus 18 units q am and humalog 6 units three times a day before meals.   Please follow up with your Endocrinologist as outpatient within 1 week of discharge from the hospital.  - Continue your medications as directed and please follow-up as an outpatient with your primary care provider for further care and recommendations.  - Please check your fingersticks every morning, before meals or if you are not feeling well.  If your fingerstick is >300 mg/dl x 3 or more readings, please use humalog scale in additon to the pr-emeal doses prescribed.    If your fingerstick low, or <70 mg/dl and/or you have symptoms of very low blood sugar, FIRST drink 1/2 cup of apple juice, (or take 4 glucose tabs/tube of glucose gel) and recheck fingerstick in 15 minutes.  Repeat these steps until blood sugar is above 100 mg/dl, IF NECESSARY.  Then call your PMD  -What to expect at follow appointment:  Please bring a log of your fingersticks and/or your glucometer to your appointment.  Your blood sugar tracking will help your diabetes team determine the best plan.      Diagnosis: Rheumatoid arthritis  Assessment and Plan of Treatment: C/w Plaquenil, follow up with your rheumatologist as outpatient. Continue your medications as directed and please follow-up as an outpatient with your primary care provider for further care and recommendations.    Diagnosis: Hyperlipidemia  Assessment and Plan of Treatment: Continue cholesterol control medications. Continue DASH diet. Follow up with your PCP within 1 week of discharge for further management and monitoring of lipid and cholesterol panels.    Diagnosis: Oral thrush  Assessment and Plan of Treatment: continue Nystatin. Continue your medications as directed and please follow-up as an outpatient with your primary care provider for further care and recommendations.    Diagnosis: Hypertension, unspecified type  Assessment and Plan of Treatment: Continue blood pressure medication regimen as directed. Monitor for any visual changes, headaches or dizziness.  Monitor blood pressure regularly.  Follow up with your PCP for further management for high blood pressure. PRINCIPAL DISCHARGE DIAGNOSIS  Diagnosis: Weakness of both lower extremities  Assessment and Plan of Treatment: A MRI of the brain was obtained which showed: Interval increased size of intracranial metastatic foci. Neurology team was consulted. Weakness of your lower extremities likely due to brain metastatic foci. Continue Keppra as prescribed. Please follow up with your oncologist and neurologist as outpatient. Continue your medications as directed and please follow-up as an outpatient with your primary care provider for further care and recommendations.      SECONDARY DISCHARGE DIAGNOSES  Diagnosis: Lung cancer metastatic to brain  Assessment and Plan of Treatment: Follow up with Oncology Dr. Waterman at [facility]. Call to schedule an appointment. Please also follow up with Dr. Estrada as outpatient. Please call to make an appointment. Continue your medications as directed and please follow-up as an outpatient with your primary care provider for further care and recommendations.    Diagnosis: Back pain  Assessment and Plan of Treatment: Continue pain medication as ordered. Continue your medications as directed and please follow-up as an outpatient with your primary care provider for further care and recommendations.    Diagnosis: Type 2 diabetes mellitus with complication, with long-term current use of insulin  Assessment and Plan of Treatment: You were seen by the Endocrinology team during your hospitalization. As you are on a dexamethasone taper your insulin will need to be tapered as well. Please follow the following instructions:   - When taking dexamethaosne 4 mg po BID, continue with Lantus solsotar pen 33units in the morning and humalog 11 units three times a day before meals.   - When taking dexamethasone 2 mg po BID, Administer lantus 25 units in the morning and hunalog 8 units three times a day before meals.   - When taking dexamethasone 2 mg daily,  Adminsiter lantus 18 units q am and humalog 6 units three times a day before meals.   Please follow up with your Endocrinologist as outpatient within 1 week of discharge from the hospital.  - Continue your medications as directed and please follow-up as an outpatient with your primary care provider for further care and recommendations.  ***LOW SLIDING SCALE AS INSTRUCTED****  - Please check your fingersticks every morning, before meals or if you are not feeling well.  If your fingerstick is >300 mg/dl x 3 or more readings, please use humalog scale in additon to the pr-emeal doses prescribed.    If your fingerstick low, or <70 mg/dl and/or you have symptoms of very low blood sugar, FIRST drink 1/2 cup of apple juice, (or take 4 glucose tabs/tube of glucose gel) and recheck fingerstick in 15 minutes.  Repeat these steps until blood sugar is above 100 mg/dl, IF NECESSARY.  Then call your PMD  -What to expect at follow appointment:  Please bring a log of your fingersticks and/or your glucometer to your appointment.  Your blood sugar tracking will help your diabetes team determine the best plan.      Diagnosis: Rheumatoid arthritis  Assessment and Plan of Treatment: C/w Plaquenil, follow up with your rheumatologist as outpatient. Continue your medications as directed and please follow-up as an outpatient with your primary care provider for further care and recommendations.    Diagnosis: Hyperlipidemia  Assessment and Plan of Treatment: Continue cholesterol control medications. Continue DASH diet. Follow up with your PCP within 1 week of discharge for further management and monitoring of lipid and cholesterol panels.    Diagnosis: Oral thrush  Assessment and Plan of Treatment: continue Nystatin. Continue your medications as directed and please follow-up as an outpatient with your primary care provider for further care and recommendations.    Diagnosis: Hypertension, unspecified type  Assessment and Plan of Treatment: Continue blood pressure medication regimen as directed. Monitor for any visual changes, headaches or dizziness.  Monitor blood pressure regularly.  Follow up with your PCP for further management for high blood pressure.

## 2019-05-21 NOTE — DISCHARGE NOTE PROVIDER - PROVIDER TOKENS
PROVIDER:[TOKEN:[[id number]]] PROVIDER:[TOKEN:[[id number]]],PROVIDER:[TOKEN:[[id number]]],PROVIDER:[TOKEN:[[id number]]],FREE:[LAST:[Primary care provider],PHONE:[(   )    -],FAX:[(   )    -]],FREE:[LAST:[Endocrine Clinic],PHONE:[(   )    -],FAX:[(   )    -],ADDRESS:[If patient wishes to follow up with [facility] Endocrinology     [facility]  [address] [place] [address] [address], [place] [address]  [phone]   Please call to schedule appointment with CDE/Nutritionist and MD appointment next available]]

## 2019-05-21 NOTE — PROGRESS NOTE ADULT - SUBJECTIVE AND OBJECTIVE BOX
Dr. Horowitz pager [phone]    Patient is a 73y old  Male who presents with a chief complaint of B/L LE weakness (20 May 2019 16:44)      SUBJECTIVE / OVERNIGHT EVENTS: pt seen at 1230p w/ dgtr at bedside- prefer to take pt home -  working on equipment  pt was c/o chest pain after trying to vomit ?heartburn  trial of maalox    MEDICATIONS  (STANDING):  aspirin enteric coated 81 milliGRAM(s) Oral daily  buDESOnide  80 MICROgram(s)/formoterol 4.5 MICROgram(s) Inhaler 2 Puff(s) Inhalation two times a day  dexamethasone     Tablet 4 milliGRAM(s) Oral two times a day  dexamethasone     Tablet   Oral   dextrose 5%. 1000 milliLiter(s) (50 mL/Hr) IV Continuous <Continuous>  dextrose 50% Injectable 12.5 Gram(s) IV Push once  dextrose 50% Injectable 25 Gram(s) IV Push once  dextrose 50% Injectable 25 Gram(s) IV Push once  enoxaparin Injectable 40 milliGRAM(s) SubCutaneous daily  gabapentin 200 milliGRAM(s) Oral at bedtime  hydroxychloroquine 200 milliGRAM(s) Oral two times a day  insulin glargine Injectable (LANTUS) 33 Unit(s) SubCutaneous every morning  insulin lispro (HumaLOG) corrective regimen sliding scale   SubCutaneous at bedtime  insulin lispro (HumaLOG) corrective regimen sliding scale   SubCutaneous three times a day before meals  insulin lispro Injectable (HumaLOG) 14 Unit(s) SubCutaneous three times a day before meals  levETIRAcetam 500 milliGRAM(s) Oral two times a day  losartan 25 milliGRAM(s) Oral daily  nystatin    Suspension [id number] Unit(s) Oral four times a day  simvastatin 10 milliGRAM(s) Oral at bedtime  tamsulosin 0.4 milliGRAM(s) Oral at bedtime  tiotropium 18 MICROgram(s) Capsule 1 Capsule(s) Inhalation daily    MEDICATIONS  (PRN):  acetaminophen   Tablet .. 650 milliGRAM(s) Oral every 6 hours PRN Mild Pain (1 - 3)  ALBUTerol    90 MICROgram(s) HFA Inhaler 2 Puff(s) Inhalation every 6 hours PRN Shortness of Breath and/or Wheezing  aluminum hydroxide/magnesium hydroxide/simethicone Suspension 30 milliLiter(s) Oral every 6 hours PRN Dyspepsia  dextrose 40% Gel 15 Gram(s) Oral once PRN Blood Glucose LESS THAN 70 milliGRAM(s)/deciliter  glucagon  Injectable 1 milliGRAM(s) IntraMuscular once PRN Glucose LESS THAN 70 milligrams/deciliter  oxyCODONE    IR 5 milliGRAM(s) Oral every 6 hours PRN moderate to severe pain      Meds ordered within last 24hours  (Floorstock):   Qty Removed: 1 each (05-20 @ 15:29)  dexamethasone  Injectable: [Ordered as DECADRON Injectable]  4 milliGRAM(s), IV Push, once, Stop After 1 Doses  Administration Instructions: Max IV Push dose 6 milliGRAM(s)  IF IV PUSH, ADMINISTER OVER 1 MIN  Provider's Contact #: [phone] (05-20 @ 17:14)  dexamethasone     Tablet: [Ordered as DECADRON]  Taper, Oral  Provider's Contact #: [phone]  Tapering Dose:   4 milliGRAM(s), two times a day, for 3 Days;   2 milliGRAM(s), two times a day, for 3 Days;   2 milliGRAM(s), daily, for 99 Days; (05-20 @ 17:14)  insulin glargine Injectable (LANTUS): [Ordered as LANTUS]  33 Unit(s), SubCutaneous, every morning  Administration Instructions: NOT to be mixed with other insulins  Dispose unused medication in BLACK bin.  Provider's Contact #: [phone] (05-20 @ 17:25)  insulin NPH human recombinant: [Known as HumuLIN N]  15 Unit(s), SubCutaneous, once, Stop After 1 Doses  Administration Instructions: Dispose unused medication in BLACK bin.  This is a Look-alike/Sound-alike Medication  Provider's Contact #: [phone] (05-20 @ 17:25)  insulin lispro Injectable (HumaLOG):   14 Unit(s), SubCutaneous, three times a day before meals  Special Instructions: Administer 5 to 15 minutes prior to meal. HOLD nutritional insulin if patient is NPO    Give if pt eats at least 50% of his meals  Administration Instructions: Dispose unused medication in BLACK bin.  This is a Look-alike/Sound-alike Medication  Provider's Contact #: [phone] (05-20 @ 17:25)  dexamethasone     Tablet: [Ordered as DECADRON]  4 milliGRAM(s), Oral, two times a day, Stop After 3 Days  Provider's Contact #: [phone]; (Taper: This is order 1 of 3) (05-21 @ 08:15)  dexamethasone     Tablet: [Ordered as DECADRON]  2 milliGRAM(s), Oral, two times a day, Stop After 3 Days  Provider's Contact #: [phone]; (Taper: This is order 2 of 3) (05-21 @ 08:15)  dexamethasone     Tablet: [Ordered as DECADRON]  2 milliGRAM(s), Oral, daily, Stop After 99 Days  Provider's Contact #: [phone]; (Taper: This is order 3 of 3) (05-21 @ 08:15)  aluminum hydroxide/magnesium hydroxide/simethicone Suspension: [Ordered as MAALOX]  30 milliLiter(s), Oral, every 6 hours, PRN for Dyspepsia  Administration Instructions: Shake well.  Provider's Contact #: [phone] (05-21 @ 12:21)      T(C): 36.8 (05-21-19 @ 06:33), Max: 36.8 (05-21-19 @ 06:33)  HR: 74 (05-21-19 @ 06:33) (57 - 74)  BP: 130/78 (05-21-19 @ 06:33) (127/60 - 133/67)  RR: 18 (05-21-19 @ 06:33) (18 - 20)  SpO2: 99% (05-21-19 @ 06:33) (95% - 99%)    CAPILLARY BLOOD GLUCOSE      POCT Blood Glucose.: 73 mg/dL (21 May 2019 12:53)  POCT Blood Glucose.: 78 mg/dL (21 May 2019 12:51)  POCT Blood Glucose.: 296 mg/dL (21 May 2019 08:53)  POCT Blood Glucose.: 329 mg/dL (20 May 2019 23:57)  POCT Blood Glucose.: 344 mg/dL (20 May 2019 22:23)  POCT Blood Glucose.: 211 mg/dL (20 May 2019 18:31)  POCT Blood Glucose.: 200 mg/dL (20 May 2019 16:11)    I&O's Summary      PHYSICAL EXAM:  GENERAL: NAD  CHEST/LUNG: Clear to auscultation bilaterally; No wheeze  HEART: Regular rate and rhythm; No murmurs, rubs, or gallops  ABDOMEN: Soft, Nontender, Nondistended; Bowel sounds present  EXTREMITIES:  No clubbing, cyanosis, or edema        LABS:                        13.7   6.81  )-----------( 72       ( 21 May 2019 06:17 )             42.7     05-21    134<L>  |  102  |  33<H>  ----------------------------<  462<HH>  4.6   |  20<L>  |  0.88    Ca    8.1<L>      21 May 2019 06:17  Phos  2.6     05-21  Mg     2.0     05-21                RADIOLOGY & ADDITIONAL TESTS:    Imaging Personally Reviewed:    Consultant(s) Notes Reviewed:      Care Discussed with Consultants/Other Providers: Dr. Horowitz pager [phone]    Patient is a 73y old  Male who presents with a chief complaint of B/L LE weakness (20 May 2019 16:44)      SUBJECTIVE / OVERNIGHT EVENTS: pt seen at 1230p w/ dgtr at bedside- prefer to take pt home -  working on equipment  pt was c/o chest pain after trying to vomit ?heartburn  trial of maalox  family asking for hospital bed    MEDICATIONS  (STANDING):  aspirin enteric coated 81 milliGRAM(s) Oral daily  buDESOnide  80 MICROgram(s)/formoterol 4.5 MICROgram(s) Inhaler 2 Puff(s) Inhalation two times a day  dexamethasone     Tablet 4 milliGRAM(s) Oral two times a day  dexamethasone     Tablet   Oral   dextrose 5%. 1000 milliLiter(s) (50 mL/Hr) IV Continuous <Continuous>  dextrose 50% Injectable 12.5 Gram(s) IV Push once  dextrose 50% Injectable 25 Gram(s) IV Push once  dextrose 50% Injectable 25 Gram(s) IV Push once  enoxaparin Injectable 40 milliGRAM(s) SubCutaneous daily  gabapentin 200 milliGRAM(s) Oral at bedtime  hydroxychloroquine 200 milliGRAM(s) Oral two times a day  insulin glargine Injectable (LANTUS) 33 Unit(s) SubCutaneous every morning  insulin lispro (HumaLOG) corrective regimen sliding scale   SubCutaneous at bedtime  insulin lispro (HumaLOG) corrective regimen sliding scale   SubCutaneous three times a day before meals  insulin lispro Injectable (HumaLOG) 14 Unit(s) SubCutaneous three times a day before meals  levETIRAcetam 500 milliGRAM(s) Oral two times a day  losartan 25 milliGRAM(s) Oral daily  nystatin    Suspension [id number] Unit(s) Oral four times a day  simvastatin 10 milliGRAM(s) Oral at bedtime  tamsulosin 0.4 milliGRAM(s) Oral at bedtime  tiotropium 18 MICROgram(s) Capsule 1 Capsule(s) Inhalation daily    MEDICATIONS  (PRN):  acetaminophen   Tablet .. 650 milliGRAM(s) Oral every 6 hours PRN Mild Pain (1 - 3)  ALBUTerol    90 MICROgram(s) HFA Inhaler 2 Puff(s) Inhalation every 6 hours PRN Shortness of Breath and/or Wheezing  aluminum hydroxide/magnesium hydroxide/simethicone Suspension 30 milliLiter(s) Oral every 6 hours PRN Dyspepsia  dextrose 40% Gel 15 Gram(s) Oral once PRN Blood Glucose LESS THAN 70 milliGRAM(s)/deciliter  glucagon  Injectable 1 milliGRAM(s) IntraMuscular once PRN Glucose LESS THAN 70 milligrams/deciliter  oxyCODONE    IR 5 milliGRAM(s) Oral every 6 hours PRN moderate to severe pain      Meds ordered within last 24hours  (Floorstock):   Qty Removed: 1 each (05-20 @ 15:29)  dexamethasone  Injectable: [Ordered as DECADRON Injectable]  4 milliGRAM(s), IV Push, once, Stop After 1 Doses  Administration Instructions: Max IV Push dose 6 milliGRAM(s)  IF IV PUSH, ADMINISTER OVER 1 MIN  Provider's Contact #: [phone] (05-20 @ 17:14)  dexamethasone     Tablet: [Ordered as DECADRON]  Taper, Oral  Provider's Contact #: [phone]  Tapering Dose:   4 milliGRAM(s), two times a day, for 3 Days;   2 milliGRAM(s), two times a day, for 3 Days;   2 milliGRAM(s), daily, for 99 Days; (05-20 @ 17:14)  insulin glargine Injectable (LANTUS): [Ordered as LANTUS]  33 Unit(s), SubCutaneous, every morning  Administration Instructions: NOT to be mixed with other insulins  Dispose unused medication in BLACK bin.  Provider's Contact #: [phone] (05-20 @ 17:25)  insulin NPH human recombinant: [Known as HumuLIN N]  15 Unit(s), SubCutaneous, once, Stop After 1 Doses  Administration Instructions: Dispose unused medication in BLACK bin.  This is a Look-alike/Sound-alike Medication  Provider's Contact #: [phone] (05-20 @ 17:25)  insulin lispro Injectable (HumaLOG):   14 Unit(s), SubCutaneous, three times a day before meals  Special Instructions: Administer 5 to 15 minutes prior to meal. HOLD nutritional insulin if patient is NPO    Give if pt eats at least 50% of his meals  Administration Instructions: Dispose unused medication in BLACK bin.  This is a Look-alike/Sound-alike Medication  Provider's Contact #: [phone] (05-20 @ 17:25)  dexamethasone     Tablet: [Ordered as DECADRON]  4 milliGRAM(s), Oral, two times a day, Stop After 3 Days  Provider's Contact #: [phone]; (Taper: This is order 1 of 3) (05-21 @ 08:15)  dexamethasone     Tablet: [Ordered as DECADRON]  2 milliGRAM(s), Oral, two times a day, Stop After 3 Days  Provider's Contact #: [phone]; (Taper: This is order 2 of 3) (05-21 @ 08:15)  dexamethasone     Tablet: [Ordered as DECADRON]  2 milliGRAM(s), Oral, daily, Stop After 99 Days  Provider's Contact #: [phone]; (Taper: This is order 3 of 3) (05-21 @ 08:15)  aluminum hydroxide/magnesium hydroxide/simethicone Suspension: [Ordered as MAALOX]  30 milliLiter(s), Oral, every 6 hours, PRN for Dyspepsia  Administration Instructions: Shake well.  Provider's Contact #: [phone] (05-21 @ 12:21)      T(C): 36.8 (05-21-19 @ 06:33), Max: 36.8 (05-21-19 @ 06:33)  HR: 74 (05-21-19 @ 06:33) (57 - 74)  BP: 130/78 (05-21-19 @ 06:33) (127/60 - 133/67)  RR: 18 (05-21-19 @ 06:33) (18 - 20)  SpO2: 99% (05-21-19 @ 06:33) (95% - 99%)    CAPILLARY BLOOD GLUCOSE      POCT Blood Glucose.: 73 mg/dL (21 May 2019 12:53)  POCT Blood Glucose.: 78 mg/dL (21 May 2019 12:51)  POCT Blood Glucose.: 296 mg/dL (21 May 2019 08:53)  POCT Blood Glucose.: 329 mg/dL (20 May 2019 23:57)  POCT Blood Glucose.: 344 mg/dL (20 May 2019 22:23)  POCT Blood Glucose.: 211 mg/dL (20 May 2019 18:31)  POCT Blood Glucose.: 200 mg/dL (20 May 2019 16:11)    I&O's Summary      PHYSICAL EXAM:  GENERAL: NAD  CHEST/LUNG: Clear to auscultation bilaterally; No wheeze  HEART: Regular rate and rhythm; No murmurs, rubs, or gallops  ABDOMEN: Soft, Nontender, Nondistended; Bowel sounds present  EXTREMITIES:  No clubbing, cyanosis, or edema        LABS:                        13.7   6.81  )-----------( 72       ( 21 May 2019 06:17 )             42.7     05-21    134<L>  |  102  |  33<H>  ----------------------------<  462<HH>  4.6   |  20<L>  |  0.88    Ca    8.1<L>      21 May 2019 06:17  Phos  2.6     05-21  Mg     2.0     05-21                RADIOLOGY & ADDITIONAL TESTS:    Imaging Personally Reviewed:    Consultant(s) Notes Reviewed:      Care Discussed with Consultants/Other Providers:

## 2019-05-21 NOTE — PROGRESS NOTE ADULT - PROBLEM SELECTOR PLAN 4
likely d/t underlying disease, f/u plts/H&H  DVT ppx plts consistently >50K --> c/w lovenox ppx uncontrolled d/t steroids  endocrine consulted

## 2019-05-21 NOTE — PROGRESS NOTE ADULT - SUBJECTIVE AND OBJECTIVE BOX
Chief Complaint/Follow-up on: DM    Subjective:  POC blood glucose and insulin use reviewed.  yesterdya with hyperglycemia at night as he did not recieve premeal glucose   this after glucose of 73- did eat all of breakfast as per the daughter   did not get covered with insulin this afternoon and did eat part of his lunch     MEDICATIONS  (STANDING):  aspirin enteric coated 81 milliGRAM(s) Oral daily  buDESOnide  80 MICROgram(s)/formoterol 4.5 MICROgram(s) Inhaler 2 Puff(s) Inhalation two times a day  dexamethasone     Tablet 4 milliGRAM(s) Oral two times a day  dexamethasone     Tablet   Oral   dextrose 5%. 1000 milliLiter(s) (50 mL/Hr) IV Continuous <Continuous>  dextrose 50% Injectable 12.5 Gram(s) IV Push once  dextrose 50% Injectable 25 Gram(s) IV Push once  dextrose 50% Injectable 25 Gram(s) IV Push once  enoxaparin Injectable 40 milliGRAM(s) SubCutaneous daily  gabapentin 200 milliGRAM(s) Oral at bedtime  hydroxychloroquine 200 milliGRAM(s) Oral two times a day  insulin glargine Injectable (LANTUS) 33 Unit(s) SubCutaneous every morning  insulin lispro (HumaLOG) corrective regimen sliding scale   SubCutaneous at bedtime  insulin lispro (HumaLOG) corrective regimen sliding scale   SubCutaneous three times a day before meals  insulin lispro Injectable (HumaLOG) 14 Unit(s) SubCutaneous three times a day before meals  levETIRAcetam 500 milliGRAM(s) Oral two times a day  losartan 25 milliGRAM(s) Oral daily  nystatin    Suspension [id number] Unit(s) Oral four times a day  simvastatin 10 milliGRAM(s) Oral at bedtime  tamsulosin 0.4 milliGRAM(s) Oral at bedtime  tiotropium 18 MICROgram(s) Capsule 1 Capsule(s) Inhalation daily    MEDICATIONS  (PRN):  acetaminophen   Tablet .. 650 milliGRAM(s) Oral every 6 hours PRN Mild Pain (1 - 3)  ALBUTerol    90 MICROgram(s) HFA Inhaler 2 Puff(s) Inhalation every 6 hours PRN Shortness of Breath and/or Wheezing  aluminum hydroxide/magnesium hydroxide/simethicone Suspension 30 milliLiter(s) Oral every 6 hours PRN Dyspepsia  dextrose 40% Gel 15 Gram(s) Oral once PRN Blood Glucose LESS THAN 70 milliGRAM(s)/deciliter  glucagon  Injectable 1 milliGRAM(s) IntraMuscular once PRN Glucose LESS THAN 70 milligrams/deciliter  oxyCODONE    IR 5 milliGRAM(s) Oral every 6 hours PRN moderate to severe pain      PHYSICAL EXAM:  VITALS: T(C): 36.7 (05-21-19 @ 14:45)  T(F): 98.1 (05-21-19 @ 14:45), Max: 98.3 (05-21-19 @ 06:33)  HR: 60 (05-21-19 @ 14:45) (60 - 74)  BP: 118/60 (05-21-19 @ 14:45) (118/60 - 133/67)  RR:  (18 - 20)  SpO2:  (96% - 99%)  Wt(kg): --  GENERAL: NAD, well-groomed, well-developed  EYES: No proptosis, no injection  HEENT:  Atraumatic, Normocephalic, dry mucous membranes   RESPIRATORY: Clear to auscultation bilaterally; No rales, rhonchi, wheezing, or rubs  CARDIOVASCULAR: Regular rate and rhythm; No murmurs; no peripheral edema  GI: Soft, nontender, non distended, normal bowel sounds      POCT Blood Glucose.: 152 mg/dL (05-21-19 @ 14:58)  POCT Blood Glucose.: 73 mg/dL (05-21-19 @ 12:53)  POCT Blood Glucose.: 78 mg/dL (05-21-19 @ 12:51)  POCT Blood Glucose.: 296 mg/dL (05-21-19 @ 08:53)  POCT Blood Glucose.: 329 mg/dL (05-20-19 @ 23:57)  POCT Blood Glucose.: 344 mg/dL (05-20-19 @ 22:23)  POCT Blood Glucose.: 211 mg/dL (05-20-19 @ 18:31)  POCT Blood Glucose.: 200 mg/dL (05-20-19 @ 16:11)  POCT Blood Glucose.: 409 mg/dL (05-20-19 @ 12:50)  POCT Blood Glucose.: 359 mg/dL (05-20-19 @ 08:51)  POCT Blood Glucose.: 245 mg/dL (05-19-19 @ 22:14)  POCT Blood Glucose.: 238 mg/dL (05-19-19 @ 18:03)  POCT Blood Glucose.: 310 mg/dL (05-19-19 @ 12:29)  POCT Blood Glucose.: 356 mg/dL (05-19-19 @ 08:49)  POCT Blood Glucose.: 314 mg/dL (05-18-19 @ 22:20)  POCT Blood Glucose.: 353 mg/dL (05-18-19 @ 17:51)    05-21    134<L>  |  102  |  33<H>  ----------------------------<  462<HH>  4.6   |  20<L>  |  0.88    EGFR if : 97  EGFR if non : 84    Ca    8.1<L>      05-21  Mg     2.0     05-21  Phos  2.6     05-21            Thyroid Function Tests:      Hemoglobin A1C, Whole Blood: 10.7 % <H> [4.0 - 5.6] (05-17-19 @ 06:30)

## 2019-05-21 NOTE — DISCHARGE NOTE PROVIDER - CARE PROVIDER_API CALL
Maribeth Waterman)  Internal Medicine; Medical Oncology  [address] [place] [address]  Phone: [phone]  Fax: [phone]  Follow Up Time: Maribeth Waterman)  Internal Medicine; Medical Oncology  [address] [place] [address]  Phone: [phone]  Fax: [phone]  Follow Up Time:     Schoenberg, Laura G (DO)  Neurology  [address] [place] [address]  Phone: [phone]  Fax: [phone]  Follow Up Time:     Jorge Luis Estrada)  Radiation Oncology  [address] [facility]  [place] [address]  Phone: [phone]  Fax: [phone]  Follow Up Time:     Primary care provider,   Phone: (   )    -  Fax: (   )    -  Follow Up Time:     Endocrine Clinic,   If patient wishes to follow up with [facility]     [facility]  [address] [facility], [address], [place] [address]  [phone]   Please call to schedule appointment with CDE/Nutritionist and MD appointment next available  Phone: (   )    -  Fax: (   )    -  Follow Up Time:

## 2019-05-21 NOTE — PROGRESS NOTE ADULT - PROBLEM SELECTOR PLAN 8
c/w anti hypertensive medications as previously prescribed Defer pharmacologic DVT ppx given metastatic brain disease. c/w Bilateral SCDs  dc planning today/tomorrow

## 2019-05-21 NOTE — PROGRESS NOTE ADULT - ATTENDING COMMENTS
Darby Fierro MD  Pager [phone] ([facility])/ [phone] ([facility]) [please provide 10 digit call back number]  Nights and weekends: [phone]  Please note that this patient may be followed by a different provider tomorrow. If no answer or after hours, please contact [phone].  For final dc reccomendations, please call [phone] or page the endocrine fellow on call.

## 2019-05-21 NOTE — PROGRESS NOTE ADULT - PROBLEM SELECTOR PLAN 5
uncontrolled d/t steroids  endocrine consulted appreciate rheum input - CK not elevated  c/w Plaquenil

## 2019-05-21 NOTE — DISCHARGE NOTE PROVIDER - CARE PROVIDERS DIRECT ADDRESSES
,DirectAddress_Unknown ,DirectAddress_Unknown,[email or web address],DirectAddress_Unknown,DirectAddress_Unknown

## 2019-05-21 NOTE — PROGRESS NOTE ADULT - PROBLEM SELECTOR PLAN 3
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  Per rads onc s/p 9/10 WBRT with Dr. Estrada at [facility], no SCC on imaging this admission, upon discharge he can be seen for re-evaluation by Dr. Estrada to determine if the last remaining fraction can be given, no role for inpt radiation  per onc - MRI brain shows worsening brain lesions- poor prognosis --> outpt onc

## 2019-05-21 NOTE — DISCHARGE NOTE PROVIDER - HOSPITAL COURSE
73M hx of Metastatic NSCLC, Former tobacco use, T2DM, Rheumatoid Arthritis, Essential Hypertension presents to [facility] ED from [facility] for Acute on Chronic weakness. 73M hx of Metastatic NSCLC, T2DM, HTN, Rheumatoid Arthritis being admitted for acute on chronic weakness        Weakness of both lower extremities    T/L MRI was obtained which was negative for spinal cord compression. Brain MRI increased brain mets size. Neurology team consulted, as per neurology weakness likely related to worsening brain mets. Patient started on dexa 4q8 to be tapered down to 2mg bid. PT was consulted and recommends rehab- family wants home        Back pain    - oxycodone PRN for back pain.         Non-small cell lung cancer    - Diagnosed 3/2019 after being found to have right upper lobe mass with mediastinal adenopathy and metastatic brain disease. Per rads onc s/p 9/10 WBRT with Dr. Estrada at [facility], no SCC on imaging this admission, upon discharge he can be seen for re-evaluation by Dr. Estrada to determine if the last remaining fraction can be given, no role for inpt radiation    per onc - MRI brain shows worsening brain lesions, follow up with oncologist as outpatient.          Type 2 diabetes mellitus    - uncontrolled d/t steroids. Endocrine Team consulted. Dexa 4q8 to be tapered down to 2mg bid.     Endocrine recommendations: D/c on basal bolus insulin dosing.  Patient was on lantus at home.  Patient is on dexamethasone taper, therefore would need to taper insulin doses as well. For dc follow this taper:    When taking dexamethaosne 4 mg po BID, C/w Lantus solsotar pen 33units in AM and humalog 11 units sq tid ac    When taking dexamethasone 2 mg po BID Adminsiter lantus 25 units in AM and hunalog 8 units tid ac    When taking dexamethasone 2 mg daily,  Adminsiter lantus 18 units q am and humalog 6 units sq tid ac        Rheumatoid arthritis    - rheumatology team consultes, CK wnl, c/w Plaquenil.         Oral thrush    - c/w nystatin swish and swallow.            Essential hypertension    - c/w anti hypertensive medications as previously prescribed.         5/22: Patient with one episode of vomiting, abd x-ray negative for acute changed, resolved.    5/23: Increase in cough, cxr obtained negative for acute changes.        As per medical attending patient is medically stable for discharge    Dispo: PT consulted and recommends rehab, patient and family declined and would like to take patient home. Patient and family verbalize understanding of risks associated with taking patient home. 73M hx of Metastatic NSCLC, T2DM, HTN, Rheumatoid Arthritis being admitted for acute on chronic weakness        Weakness of both lower extremities    T/L MRI was obtained which was negative for spinal cord compression. Brain MRI increased brain mets size. Neurology team consulted, as per neurology weakness likely related to worsening brain mets. Patient started on dexa 4q8 to be tapered down to 2mg bid. PT was consulted and recommends rehab- family wants home        Back pain    - oxycodone PRN for back pain.         Non-small cell lung cancer    - Diagnosed 3/2019 after being found to have right upper lobe mass with mediastinal adenopathy and metastatic brain disease. Per rads onc s/p 9/10 WBRT with Dr. Estrada at [facility], no SCC on imaging this admission, upon discharge he can be seen for re-evaluation by Dr. Estrada to determine if the last remaining fraction can be given, no role for inpt radiation    per onc - MRI brain shows worsening brain lesions, follow up with oncologist as outpatient.          Type 2 diabetes mellitus    - uncontrolled d/t steroids. Endocrine Team consulted. Dexa 4q8 to be tapered down to 2mg bid.     Endocrine recommendations: D/c on basal bolus insulin dosing.  Patient was on lantus at home.  Patient is on dexamethasone taper, therefore would need to taper insulin doses as well. For dc follow this taper:    When taking dexamethasone 4 mg po BID, C/w Lantus solsotar pen 33units in AM and humalog 11 units sq tid ac    When taking dexamethasone 2 mg po BID Adminster lantus 25 units in AM and hunalog 8 units tid ac    When taking dexamethasone 2 mg daily,  Adminster lantus 18 units q am and humalog 6 units sq tid ac        Rheumatoid arthritis    - rheumatology team consults, CK wnl, c/w Plaquenil.         Oral thrush    - c/w nystatin swish and swallow.        Essential hypertension    - c/w anti hypertensive medications as previously prescribed.         5/22: Patient with one episode of vomiting, abd x-ray negative for acute changed, resolved.    5/23: Increase in cough, cxr obtained negative for acute changes.            As per medical attending patient is medically stable for discharge    Dispo: PT consulted and recommends rehab, patient and family declined and would like to take patient home. Patient and family verbalize understanding of risks associated with taking patient home. 73M hx of Metastatic NSCLC, T2DM, HTN, Rheumatoid Arthritis being admitted for acute on chronic weakness- likely from brain mets    seen by neuro and onc    seen by endocrine for uncontrolled dm    pt did not want rehab- sent home        Weakness of both lower extremities    T/L MRI was obtained which was negative for spinal cord compression. Brain MRI increased brain mets size. Neurology team consulted, as per neurology weakness likely related to worsening brain mets. Patient started on dexa 4q8 to be tapered down to 2mg bid. PT was consulted and recommends rehab- family wants home        Back pain    - oxycodone PRN for back pain.         Non-small cell lung cancer    - Diagnosed 3/2019 after being found to have right upper lobe mass with mediastinal adenopathy and metastatic brain disease. Per rads onc s/p 9/10 WBRT with Dr. Estrada at [facility], no SCC on imaging this admission, upon discharge he can be seen for re-evaluation by Dr. Estrada to determine if the last remaining fraction can be given, no role for inpt radiation    per onc - MRI brain shows worsening brain lesions, follow up with oncologist as outpatient.          Type 2 diabetes mellitus    - uncontrolled d/t steroids. Endocrine Team consulted. Dexa 4q8 to be tapered down to 2mg bid.     Endocrine recommendations: D/c on basal bolus insulin dosing.  Patient was on lantus at home.  Patient is on dexamethasone taper, therefore would need to taper insulin doses as well. For dc follow this taper:    When taking dexamethasone 4 mg po BID, C/w Lantus solsotar pen 33units in AM and humalog 11 units sq tid ac    When taking dexamethasone 2 mg po BID Adminster lantus 25 units in AM and hunalog 8 units tid ac    When taking dexamethasone 2 mg daily,  Adminster lantus 18 units q am and humalog 6 units sq tid ac        Rheumatoid arthritis    - rheumatology team consults, CK wnl, c/w Plaquenil.         Oral thrush    - c/w nystatin swish and swallow.        Essential hypertension    - c/w anti hypertensive medications as previously prescribed.         5/22: Patient with one episode of vomiting, abd x-ray negative for acute changed, resolved.    5/23: Increase in cough, cxr obtained negative for acute changes.            As per medical attending patient is medically stable for discharge    Dispo: PT consulted and recommends rehab, patient and family declined and would like to take patient home. Patient and family verbalize understanding of risks associated with taking patient home.

## 2019-05-21 NOTE — PROGRESS NOTE ADULT - ASSESSMENT
73M hx of Metastatic NSCLC, Former tobacco use, T2DM, Rheumatoid Arthritis, Essential Hypertension presents to [facility] ED from [facility] for Acute on Chronic weakness. As per patient, he has had LUE/LLE weakness, now on high dose steroids with marked steroid induced hyperglycemia   A1C 10.7      #diabetes  goal glucose 140-180. avoid hypoglycemia   check FSBG TID qac and hs  carb consistent diet   5/21 plan:  steroids tapered to dex 4 BID now   lantus 33 units in the morning   -decrease to Humalog 12-12-12 before meals if he eats 50% or more of the meal. HOLD IF NPO  -high dose ISS premeals and bedtime   -RDE consult to help with meal planning (high carb diet at home).     inform endocrine of hypoglycemia or persistent hyperglycemia episodes as changes in pts insulin regimen will need to be made.   notify endocrine if any plans to be NPO/diet changes as this will also affect insulin regimen.  INFORM ENDOCRINE if any plan for further steroid taper       DC regimen:   inform endocrine of dc planning   TBD based on outpt plan for steroids and insulin requirments inpt  likely basal bolus vs Basal +metformin   outpt followup with PMD   daughter knows to call PMD with highs or lows and that insulin regimen will need to be adjusted as steroids are decreased   If patient wishes to follow up with [facility] Endocrinology     [facility]  [address] [place] [address], [address], [place] [address]  [phone]   Please call to schedule appointment with CDE/Nutritionist and MD appointment next available 73M hx of Metastatic NSCLC, Former tobacco use, T2DM, Rheumatoid Arthritis, Essential Hypertension presents to [facility] ED from [facility] for Acute on Chronic weakness. As per patient, he has had LUE/LLE weakness, now on high dose steroids with marked steroid induced hyperglycemia   A1C 10.7      #diabetes  goal glucose 140-180. avoid hypoglycemia   check FSBG TID qac and hs  carb consistent diet   5/21 plan:  steroids tapered to dex 4 BID now   lantus 33 units in the morning   -decrease to Humalog 12-12-12 before meals if he eats 50% or more of the meal. HOLD IF NPO  -high dose ISS premeals and bedtime   -RDE consult to help with meal planning (high carb diet at home).     inform endocrine of hypoglycemia or persistent hyperglycemia episodes as changes in pts insulin regimen will need to be made.   notify endocrine if any plans to be NPO/diet changes as this will also affect insulin regimen.  INFORM ENDOCRINE if any plan for further steroid taper       DC regimen:   inform endocrine of dc planning   TBD based on outpt plan for steroids and insulin requirments inpt  likely basal bolus vs Basal +metformin   outpt followup with PMD   daughter knows to call PMD with highs or lows and that insulin regimen will need to be adjusted as steroids are decreased   we will give a tentative steroid taper, however when pt is down to 2mg Dex and is doing well without hyperglcyemia can consider restarting metformin at that time and along with Lantus 10-15 units   If patient wishes to follow up with [facility] Endocrinology     [facility]  [address] [place] [address], [address], [place] [address]  [phone]   Please call to schedule appointment with CDE/Nutritionist and MD appointment next available

## 2019-05-21 NOTE — PROVIDER CONTACT NOTE (OTHER) - BACKGROUND
Hx NSCLC
Pt. admitted with DM type 2. Currently on steroids.
Pt. with DM. Pt. has been hyperglycemic this admission. Insulin regimen adjusted and blood sugar now 73

## 2019-05-22 ENCOUNTER — TRANSCRIPTION ENCOUNTER (OUTPATIENT)
Age: 74
End: 2019-05-22

## 2019-05-22 VITALS
HEART RATE: 58 BPM | RESPIRATION RATE: 18 BRPM | SYSTOLIC BLOOD PRESSURE: 133 MMHG | OXYGEN SATURATION: 96 % | TEMPERATURE: 98 F | DIASTOLIC BLOOD PRESSURE: 73 MMHG

## 2019-05-22 LAB
ANION GAP SERPL CALC-SCNC: 11 MMO/L — SIGNIFICANT CHANGE UP (ref 7–14)
BUN SERPL-MCNC: 31 MG/DL — HIGH (ref 7–23)
CALCIUM SERPL-MCNC: 8.5 MG/DL — SIGNIFICANT CHANGE UP (ref 8.4–10.5)
CHLORIDE SERPL-SCNC: 103 MMOL/L — SIGNIFICANT CHANGE UP (ref 98–107)
CO2 SERPL-SCNC: 24 MMOL/L — SIGNIFICANT CHANGE UP (ref 22–31)
CREAT SERPL-MCNC: 0.89 MG/DL — SIGNIFICANT CHANGE UP (ref 0.5–1.3)
GLUCOSE BLDC GLUCOMTR-MCNC: 191 MG/DL — HIGH (ref 70–99)
GLUCOSE BLDC GLUCOMTR-MCNC: 355 MG/DL — HIGH (ref 70–99)
GLUCOSE SERPL-MCNC: 225 MG/DL — HIGH (ref 70–99)
HCT VFR BLD CALC: 42.3 % — SIGNIFICANT CHANGE UP (ref 39–50)
HGB BLD-MCNC: 13.5 G/DL — SIGNIFICANT CHANGE UP (ref 13–17)
MCHC RBC-ENTMCNC: 27.1 PG — SIGNIFICANT CHANGE UP (ref 27–34)
MCHC RBC-ENTMCNC: 31.9 % — LOW (ref 32–36)
MCV RBC AUTO: 84.8 FL — SIGNIFICANT CHANGE UP (ref 80–100)
NRBC # FLD: 0.08 K/UL — SIGNIFICANT CHANGE UP (ref 0–0)
NRBC FLD-RTO: 1 — SIGNIFICANT CHANGE UP
PLATELET # BLD AUTO: 86 K/UL — LOW (ref 150–400)
PMV BLD: 13 FL — SIGNIFICANT CHANGE UP (ref 7–13)
POTASSIUM SERPL-MCNC: 4.6 MMOL/L — SIGNIFICANT CHANGE UP (ref 3.5–5.3)
POTASSIUM SERPL-SCNC: 4.6 MMOL/L — SIGNIFICANT CHANGE UP (ref 3.5–5.3)
RBC # BLD: 4.99 M/UL — SIGNIFICANT CHANGE UP (ref 4.2–5.8)
RBC # FLD: 15.7 % — HIGH (ref 10.3–14.5)
SODIUM SERPL-SCNC: 138 MMOL/L — SIGNIFICANT CHANGE UP (ref 135–145)
WBC # BLD: 7.81 K/UL — SIGNIFICANT CHANGE UP (ref 3.8–10.5)
WBC # FLD AUTO: 7.81 K/UL — SIGNIFICANT CHANGE UP (ref 3.8–10.5)

## 2019-05-22 PROCEDURE — 99239 HOSP IP/OBS DSCHRG MGMT >30: CPT

## 2019-05-22 PROCEDURE — 99232 SBSQ HOSP IP/OBS MODERATE 35: CPT

## 2019-05-22 PROCEDURE — 71045 X-RAY EXAM CHEST 1 VIEW: CPT | Mod: 26

## 2019-05-22 RX ORDER — INSULIN GLARGINE 100 [IU]/ML
10 INJECTION, SOLUTION SUBCUTANEOUS
Qty: 0 | Refills: 0 | DISCHARGE

## 2019-05-22 RX ORDER — ENOXAPARIN SODIUM 100 MG/ML
18 INJECTION SUBCUTANEOUS
Qty: 15 | Refills: 0
Start: 2019-05-22 | End: 2019-06-20

## 2019-05-22 RX ORDER — INSULIN LISPRO 100/ML
6 VIAL (ML) SUBCUTANEOUS
Qty: 15 | Refills: 0
Start: 2019-05-22 | End: 2019-06-20

## 2019-05-22 RX ORDER — ENOXAPARIN SODIUM 100 MG/ML
33 INJECTION SUBCUTANEOUS
Qty: 15 | Refills: 0
Start: 2019-05-22 | End: 2019-06-20

## 2019-05-22 RX ORDER — ACETAMINOPHEN 500 MG
2 TABLET ORAL
Qty: 0 | Refills: 0 | DISCHARGE
Start: 2019-05-22

## 2019-05-22 RX ORDER — NYSTATIN 500MM UNIT
5 POWDER (EA) MISCELLANEOUS
Qty: 140 | Refills: 0
Start: 2019-05-22 | End: 2019-05-28

## 2019-05-22 RX ORDER — INSULIN LISPRO 100/ML
11 VIAL (ML) SUBCUTANEOUS
Qty: 15 | Refills: 0
Start: 2019-05-22 | End: 2019-06-20

## 2019-05-22 RX ORDER — METFORMIN HYDROCHLORIDE 850 MG/1
1 TABLET ORAL
Qty: 0 | Refills: 0 | DISCHARGE

## 2019-05-22 RX ORDER — OXYCODONE HYDROCHLORIDE 5 MG/1
1 TABLET ORAL
Qty: 12 | Refills: 0
Start: 2019-05-22 | End: 2019-05-24

## 2019-05-22 RX ORDER — DEXAMETHASONE 0.5 MG/5ML
1 ELIXIR ORAL
Qty: 99 | Refills: 0
Start: 2019-05-22 | End: 2019-08-28

## 2019-05-22 RX ORDER — INSULIN LISPRO 100/ML
1 VIAL (ML) SUBCUTANEOUS
Qty: 0 | Refills: 0 | DISCHARGE

## 2019-05-22 RX ORDER — DEXAMETHASONE 0.5 MG/5ML
1 ELIXIR ORAL
Qty: 0 | Refills: 0 | DISCHARGE

## 2019-05-22 RX ADMIN — Medication 500000 UNIT(S): at 11:02

## 2019-05-22 RX ADMIN — BUDESONIDE AND FORMOTEROL FUMARATE DIHYDRATE 2 PUFF(S): 160; 4.5 AEROSOL RESPIRATORY (INHALATION) at 09:02

## 2019-05-22 RX ADMIN — Medication 4 MILLIGRAM(S): at 05:19

## 2019-05-22 RX ADMIN — Medication 81 MILLIGRAM(S): at 11:01

## 2019-05-22 RX ADMIN — Medication 200 MILLIGRAM(S): at 05:19

## 2019-05-22 RX ADMIN — Medication 500000 UNIT(S): at 05:19

## 2019-05-22 RX ADMIN — Medication 11 UNIT(S): at 12:59

## 2019-05-22 RX ADMIN — Medication 10: at 12:58

## 2019-05-22 RX ADMIN — LEVETIRACETAM 500 MILLIGRAM(S): 250 TABLET, FILM COATED ORAL at 05:20

## 2019-05-22 RX ADMIN — Medication 11 UNIT(S): at 09:01

## 2019-05-22 RX ADMIN — TIOTROPIUM BROMIDE 1 CAPSULE(S): 18 CAPSULE ORAL; RESPIRATORY (INHALATION) at 11:01

## 2019-05-22 RX ADMIN — INSULIN GLARGINE 33 UNIT(S): 100 INJECTION, SOLUTION SUBCUTANEOUS at 09:02

## 2019-05-22 RX ADMIN — Medication 2: at 09:01

## 2019-05-22 RX ADMIN — ENOXAPARIN SODIUM 40 MILLIGRAM(S): 100 INJECTION SUBCUTANEOUS at 11:01

## 2019-05-22 RX ADMIN — PANTOPRAZOLE SODIUM 40 MILLIGRAM(S): 20 TABLET, DELAYED RELEASE ORAL at 05:20

## 2019-05-22 NOTE — PROGRESS NOTE ADULT - REASON FOR ADMISSION
B/L LE weakness

## 2019-05-22 NOTE — PROGRESS NOTE ADULT - SUBJECTIVE AND OBJECTIVE BOX
Dr. Horowitz pager [phone]    Patient is a 73y old  Male who presents with a chief complaint of B/L LE weakness (22 May 2019 14:35)      SUBJECTIVE / OVERNIGHT EVENTS:    MEDICATIONS  (STANDING):  aspirin enteric coated 81 milliGRAM(s) Oral daily  buDESOnide  80 MICROgram(s)/formoterol 4.5 MICROgram(s) Inhaler 2 Puff(s) Inhalation two times a day  dexamethasone     Tablet 4 milliGRAM(s) Oral two times a day  dexamethasone     Tablet   Oral   dextrose 5%. 1000 milliLiter(s) (50 mL/Hr) IV Continuous <Continuous>  dextrose 50% Injectable 12.5 Gram(s) IV Push once  dextrose 50% Injectable 25 Gram(s) IV Push once  dextrose 50% Injectable 25 Gram(s) IV Push once  enoxaparin Injectable 40 milliGRAM(s) SubCutaneous daily  gabapentin 200 milliGRAM(s) Oral at bedtime  hydroxychloroquine 200 milliGRAM(s) Oral two times a day  insulin glargine Injectable (LANTUS) 33 Unit(s) SubCutaneous every morning  insulin lispro (HumaLOG) corrective regimen sliding scale   SubCutaneous at bedtime  insulin lispro (HumaLOG) corrective regimen sliding scale   SubCutaneous three times a day before meals  insulin lispro Injectable (HumaLOG) 11 Unit(s) SubCutaneous three times a day before meals  levETIRAcetam 500 milliGRAM(s) Oral two times a day  losartan 25 milliGRAM(s) Oral daily  nystatin    Suspension 239040 Unit(s) Oral four times a day  pantoprazole    Tablet 40 milliGRAM(s) Oral before breakfast  simvastatin 10 milliGRAM(s) Oral at bedtime  tamsulosin 0.4 milliGRAM(s) Oral at bedtime  tiotropium 18 MICROgram(s) Capsule 1 Capsule(s) Inhalation daily    MEDICATIONS  (PRN):  acetaminophen   Tablet .. 650 milliGRAM(s) Oral every 6 hours PRN Mild Pain (1 - 3)  ALBUTerol    90 MICROgram(s) HFA Inhaler 2 Puff(s) Inhalation every 6 hours PRN Shortness of Breath and/or Wheezing  aluminum hydroxide/magnesium hydroxide/simethicone Suspension 30 milliLiter(s) Oral every 6 hours PRN Dyspepsia  dextrose 40% Gel 15 Gram(s) Oral once PRN Blood Glucose LESS THAN 70 milliGRAM(s)/deciliter  glucagon  Injectable 1 milliGRAM(s) IntraMuscular once PRN Glucose LESS THAN 70 milligrams/deciliter  oxyCODONE    IR 5 milliGRAM(s) Oral every 6 hours PRN moderate to severe pain      Meds ordered within last 24hours  insulin lispro Injectable (HumaLOG):   11 Unit(s), SubCutaneous, three times a day before meals  Special Instructions: hold if NPO   can give 10 min into the meal to assess PO intake     give 11 units if eats 50% or more of the meal   if eats 50% or less, than give 6 units  Administration Instructions: Dispose unused medication in BLACK bin.  This is a Look-alike/Sound-alike Medication  Provider's Contact #: [phone] (05-21 @ 16:16)      T(C): 36.4 (05-22-19 @ 14:31), Max: 36.8 (05-21-19 @ 21:55)  HR: 58 (05-22-19 @ 14:31) (58 - 68)  BP: 133/73 (05-22-19 @ 14:31) (106/59 - 133/73)  RR: 18 (05-22-19 @ 14:31) (18 - 18)  SpO2: 96% (05-22-19 @ 14:31) (95% - 96%)    CAPILLARY BLOOD GLUCOSE      POCT Blood Glucose.: 355 mg/dL (22 May 2019 12:54)  POCT Blood Glucose.: 191 mg/dL (22 May 2019 08:58)  POCT Blood Glucose.: 261 mg/dL (21 May 2019 22:30)  POCT Blood Glucose.: 72 mg/dL (21 May 2019 18:19)    I&O's Summary      PHYSICAL EXAM:  GENERAL: NAD  CHEST/LUNG: Clear to auscultation bilaterally; No wheeze  HEART: Regular rate and rhythm; No murmurs, rubs, or gallops  ABDOMEN: Soft, Nontender, Nondistended; Bowel sounds present  EXTREMITIES:  No clubbing, cyanosis, or edema  NEURO: A&Ox3      LABS:                        13.5   7.81  )-----------( 86       ( 22 May 2019 06:30 )             42.3     05-22    138  |  103  |  31<H>  ----------------------------<  225<H>  4.6   |  24  |  0.89    Ca    8.5      22 May 2019 06:30  Phos  2.6     05-21  Mg     2.0     05-21                RADIOLOGY & ADDITIONAL TESTS:    Imaging Personally Reviewed:    Consultant(s) Notes Reviewed:      Care Discussed with Consultants/Other Providers: Dr. Horowitz pager [phone]    Patient is a 73y old  Male who presents with a chief complaint of B/L LE weakness (22 May 2019 14:35)      SUBJECTIVE / OVERNIGHT EVENTS: pt seen at 10am and again at 12 w dgtr  no new complaints  no vomiting    MEDICATIONS  (STANDING):  aspirin enteric coated 81 milliGRAM(s) Oral daily  buDESOnide  80 MICROgram(s)/formoterol 4.5 MICROgram(s) Inhaler 2 Puff(s) Inhalation two times a day  dexamethasone     Tablet 4 milliGRAM(s) Oral two times a day  dexamethasone     Tablet   Oral   dextrose 5%. 1000 milliLiter(s) (50 mL/Hr) IV Continuous <Continuous>  dextrose 50% Injectable 12.5 Gram(s) IV Push once  dextrose 50% Injectable 25 Gram(s) IV Push once  dextrose 50% Injectable 25 Gram(s) IV Push once  enoxaparin Injectable 40 milliGRAM(s) SubCutaneous daily  gabapentin 200 milliGRAM(s) Oral at bedtime  hydroxychloroquine 200 milliGRAM(s) Oral two times a day  insulin glargine Injectable (LANTUS) 33 Unit(s) SubCutaneous every morning  insulin lispro (HumaLOG) corrective regimen sliding scale   SubCutaneous at bedtime  insulin lispro (HumaLOG) corrective regimen sliding scale   SubCutaneous three times a day before meals  insulin lispro Injectable (HumaLOG) 11 Unit(s) SubCutaneous three times a day before meals  levETIRAcetam 500 milliGRAM(s) Oral two times a day  losartan 25 milliGRAM(s) Oral daily  nystatin    Suspension 459396 Unit(s) Oral four times a day  pantoprazole    Tablet 40 milliGRAM(s) Oral before breakfast  simvastatin 10 milliGRAM(s) Oral at bedtime  tamsulosin 0.4 milliGRAM(s) Oral at bedtime  tiotropium 18 MICROgram(s) Capsule 1 Capsule(s) Inhalation daily    MEDICATIONS  (PRN):  acetaminophen   Tablet .. 650 milliGRAM(s) Oral every 6 hours PRN Mild Pain (1 - 3)  ALBUTerol    90 MICROgram(s) HFA Inhaler 2 Puff(s) Inhalation every 6 hours PRN Shortness of Breath and/or Wheezing  aluminum hydroxide/magnesium hydroxide/simethicone Suspension 30 milliLiter(s) Oral every 6 hours PRN Dyspepsia  dextrose 40% Gel 15 Gram(s) Oral once PRN Blood Glucose LESS THAN 70 milliGRAM(s)/deciliter  glucagon  Injectable 1 milliGRAM(s) IntraMuscular once PRN Glucose LESS THAN 70 milligrams/deciliter  oxyCODONE    IR 5 milliGRAM(s) Oral every 6 hours PRN moderate to severe pain      Meds ordered within last 24hours  insulin lispro Injectable (HumaLOG):   11 Unit(s), SubCutaneous, three times a day before meals  Special Instructions: hold if NPO   can give 10 min into the meal to assess PO intake     give 11 units if eats 50% or more of the meal   if eats 50% or less, than give 6 units  Administration Instructions: Dispose unused medication in BLACK bin.  This is a Look-alike/Sound-alike Medication  Provider's Contact #: [phone] (05-21 @ 16:16)      T(C): 36.4 (05-22-19 @ 14:31), Max: 36.8 (05-21-19 @ 21:55)  HR: 58 (05-22-19 @ 14:31) (58 - 68)  BP: 133/73 (05-22-19 @ 14:31) (106/59 - 133/73)  RR: 18 (05-22-19 @ 14:31) (18 - 18)  SpO2: 96% (05-22-19 @ 14:31) (95% - 96%)    CAPILLARY BLOOD GLUCOSE      POCT Blood Glucose.: 355 mg/dL (22 May 2019 12:54)  POCT Blood Glucose.: 191 mg/dL (22 May 2019 08:58)  POCT Blood Glucose.: 261 mg/dL (21 May 2019 22:30)  POCT Blood Glucose.: 72 mg/dL (21 May 2019 18:19)    I&O's Summary      PHYSICAL EXAM:  GENERAL: NAD  CHEST/LUNG: Clear to auscultation bilaterally; No wheeze  HEART: Regular rate and rhythm; No murmurs, rubs, or gallops  ABDOMEN: Soft, Nontender, Nondistended; Bowel sounds present  EXTREMITIES:  No clubbing, cyanosis, or edema        LABS:                        13.5   7.81  )-----------( 86       ( 22 May 2019 06:30 )             42.3     05-22    138  |  103  |  31<H>  ----------------------------<  225<H>  4.6   |  24  |  0.89    Ca    8.5      22 May 2019 06:30  Phos  2.6     05-21  Mg     2.0     05-21                RADIOLOGY & ADDITIONAL TESTS:    Imaging Personally Reviewed:    Consultant(s) Notes Reviewed:      Care Discussed with Consultants/Other Providers:

## 2019-05-22 NOTE — PROGRESS NOTE ADULT - PROBLEM SELECTOR PLAN 2
oxycodone PRN for back pain

## 2019-05-22 NOTE — DISCHARGE NOTE NURSING/CASE MANAGEMENT/SOCIAL WORK - NSSCCARECORD_GEN_ALL_CORE
Durable Medical Equipment Agency/Home Care Agency Home Care Agency/Durable Medical Equipment Agency/Community [facility]

## 2019-05-22 NOTE — PROGRESS NOTE ADULT - PROBLEM SELECTOR PLAN 3
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  Per rads onc s/p 9/10 WBRT with Dr. Estrada at [facility], no SCC on imaging this admission, upon discharge he can be seen for re-evaluation by Dr. Estrada to determine if the last remaining fraction can be given, no role for inpt radiation  per onc - MRI brain shows worsening brain lesions- poor prognosis --> outpt onc

## 2019-05-22 NOTE — PROGRESS NOTE ADULT - PROBLEM SELECTOR PLAN 9
due to cancer  pt at risk for aspiration due to debility- needs hospital bed to keep head of bed at 30deg to decrease risk of aspiration  in addition, pt needs gel overlay to decrease risk of skin breakdown

## 2019-05-22 NOTE — PROGRESS NOTE ADULT - ASSESSMENT
73M hx of Metastatic NSCLC, Former tobacco use, T2DM, Rheumatoid Arthritis, Essential Hypertension presents to [facility] ED from [facility] for Acute on Chronic weakness. As per patient, he has had LUE/LLE weakness, now on high dose steroids with marked steroid induced hyperglycemia   A1C 10.7      #diabetes  goal glucose 140-180. avoid hypoglycemia   check FSBG TID qac and hs  carb consistent diet     steroids tapered to dex 4 BID now   lantus 33 units in the morning   c/w to Humalog 12-12-12 before meals if he eats 50% or more of the meal. HOLD IF NPO  -high dose ISS premeals and bedtime   -RDE consult to help with meal planning (high carb diet at home).     inform endocrine of hypoglycemia or persistent hyperglycemia episodes as changes in pts insulin regimen will need to be made.   notify endocrine if any plans to be NPO/diet changes as this will also affect insulin regimen.  INFORM ENDOCRINE if any plan for further steroid taper       DC regimen:   D/c on basal bolus insulin dosing.  Patient was on lantus at home.  Patient is on dexamethasone taper, therefore would need to taper insulin doses as well:  For dc follow this taper  When taking dexamethaosne 4 mg po BID, C/w Lantus solsotar pen 33units in AM and humalog 11 units sq tid ac  When taking dexamethasone 2 mg po BID Adminsiter lantus 25 units in AM and hunalog 8 units tid ac  When taking dexamethasone 2 mg daily,  Adminsiter lantus 18 units q am and humalog 6 units sq tid ac  Please also discharge on low humalog correciton scale before meals only.      PLease send prescription for humalog kwikpen - check insurance coverage.  If not covered please use alternative.    Advise Patient on Discharge note:   Please check your fingersticks every morning, before meals or if you are not feeling well.  If your fingerstick is >300 mg/dl x 3 or more readings, please use humalog scale in additon to the pr-emeal doses prescribed.    If your fingerstick low, or <70 mg/dl and/or you have symptoms of very low blood sugar, FIRST drink 1/2 cup of apple juice, (or take 4 glucose tabs/tube of glucose gel) and recheck fingerstick in 15 minutes.  Repeat these steps until blood sugar is above 100 mg/dl, IF NECESSARY.  Then call your PMD    -What to expect at follow appointment:  Please bring a log of your fingersticks and/or your glucometer to your appointment.  Your blood sugar tracking will help your diabetes team determine the best plan.    If patient wishes to follow up with [facility] Endocrinology     [facility]  [address] [place] [address], [address] [address]  [phone]   Please call to schedule appointment with CDE/Nutritionist and MD appointment next available

## 2019-05-22 NOTE — PROGRESS NOTE ADULT - PROVIDER SPECIALTY LIST ADULT
Endocrinology
Endocrinology
Heme/Onc
Hospitalist
Neurology
Neurology
Heme/Onc
Hospitalist

## 2019-05-22 NOTE — DISCHARGE NOTE NURSING/CASE MANAGEMENT/SOCIAL WORK - NSDCPEWEB_GEN_ALL_CORE
[place] website --- [email or web address] for Tobacco [email or web address] website --- [email or web address]

## 2019-05-22 NOTE — PROGRESS NOTE ADULT - PROBLEM SELECTOR PROBLEM 1
Type 2 diabetes mellitus with complication, with long-term current use of insulin
Type 2 diabetes mellitus with complication, with long-term current use of insulin
Weakness of both lower extremities

## 2019-05-22 NOTE — DISCHARGE NOTE NURSING/CASE MANAGEMENT/SOCIAL WORK - NSDCDPATPORTLINK_GEN_ALL_CORE
You can access the [facility] Patient Portal, offered by [facility], by registering with the following website: [email or web address]

## 2019-05-22 NOTE — PROGRESS NOTE ADULT - PROBLEM SELECTOR PLAN 8
Defer pharmacologic DVT ppx given metastatic brain disease. c/w Bilateral SCDs  dc planning today/tomorrow

## 2019-05-22 NOTE — PROGRESS NOTE ADULT - ASSESSMENT
73M hx of Metastatic NSCLC, T2DM, HTN, Rheumatoid Arthritis being admitted for acute on chronic weakness 73M hx of Metastatic NSCLC, T2DM, HTN, Rheumatoid Arthritis being admitted for acute on chronic weakness likely d/t brain mets- completed most of his RT- for home does not want rehab- uncontrolled DM seen by endocrine.  cough cxr neg  for dc home today  time 40min

## 2019-05-22 NOTE — PROGRESS NOTE ADULT - SUBJECTIVE AND OBJECTIVE BOX
Chief Complaint/Follow-up on: DM    Subjective:  good appetie.  denies n/v, no hypoglycemia but BG was below taerget range yesterday.  On dexamethasone taper    MEDICATIONS  (STANDING):  aspirin enteric coated 81 milliGRAM(s) Oral daily  buDESOnide  80 MICROgram(s)/formoterol 4.5 MICROgram(s) Inhaler 2 Puff(s) Inhalation two times a day  dexamethasone     Tablet 4 milliGRAM(s) Oral two times a day  dexamethasone     Tablet   Oral   dextrose 5%. 1000 milliLiter(s) (50 mL/Hr) IV Continuous <Continuous>  dextrose 50% Injectable 12.5 Gram(s) IV Push once  dextrose 50% Injectable 25 Gram(s) IV Push once  dextrose 50% Injectable 25 Gram(s) IV Push once  enoxaparin Injectable 40 milliGRAM(s) SubCutaneous daily  gabapentin 200 milliGRAM(s) Oral at bedtime  hydroxychloroquine 200 milliGRAM(s) Oral two times a day  insulin glargine Injectable (LANTUS) 33 Unit(s) SubCutaneous every morning  insulin lispro (HumaLOG) corrective regimen sliding scale   SubCutaneous at bedtime  insulin lispro (HumaLOG) corrective regimen sliding scale   SubCutaneous three times a day before meals  insulin lispro Injectable (HumaLOG) 14 Unit(s) SubCutaneous three times a day before meals  levETIRAcetam 500 milliGRAM(s) Oral two times a day  losartan 25 milliGRAM(s) Oral daily  nystatin    Suspension 038184 Unit(s) Oral four times a day  simvastatin 10 milliGRAM(s) Oral at bedtime  tamsulosin 0.4 milliGRAM(s) Oral at bedtime  tiotropium 18 MICROgram(s) Capsule 1 Capsule(s) Inhalation daily    MEDICATIONS  (PRN):  acetaminophen   Tablet .. 650 milliGRAM(s) Oral every 6 hours PRN Mild Pain (1 - 3)  ALBUTerol    90 MICROgram(s) HFA Inhaler 2 Puff(s) Inhalation every 6 hours PRN Shortness of Breath and/or Wheezing  aluminum hydroxide/magnesium hydroxide/simethicone Suspension 30 milliLiter(s) Oral every 6 hours PRN Dyspepsia  dextrose 40% Gel 15 Gram(s) Oral once PRN Blood Glucose LESS THAN 70 milliGRAM(s)/deciliter  glucagon  Injectable 1 milliGRAM(s) IntraMuscular once PRN Glucose LESS THAN 70 milligrams/deciliter  oxyCODONE    IR 5 milliGRAM(s) Oral every 6 hours PRN moderate to severe pain      Vital Signs Last 24 Hrs  T(C): 36.4 (22 May 2019 14:31), Max: 36.8 (21 May 2019 21:55)  T(F): 97.6 (22 May 2019 14:31), Max: 98.2 (21 May 2019 21:55)  HR: 58 (22 May 2019 14:31) (58 - 68)  BP: 133/73 (22 May 2019 14:31) (106/59 - 133/73)  BP(mean): --  RR: 18 (22 May 2019 14:31) (18 - 20)  SpO2: 96% (22 May 2019 14:31) (95% - 97%)  GENERAL: NAD, well-groomed, well-developed  EYES: No proptosis, no injection  HEENT:  Atraumatic, Normocephalic, dry mucous membranes   RESPIRATORY: Clear to auscultation bilaterally; No rales, rhonchi, wheezing, or rubs  CARDIOVASCULAR: Regular rate and rhythm; No murmurs; no peripheral edema  GI: Soft, nontender, non distended, normal bowel sounds    CAPILLARY BLOOD GLUCOSE  POCT Blood Glucose.: 355 mg/dL (22 May 2019 12:54)  POCT Blood Glucose.: 191 mg/dL (22 May 2019 08:58)  POCT Blood Glucose.: 261 mg/dL (21 May 2019 22:30)  POCT Blood Glucose.: 72 mg/dL (21 May 2019 18:19)  POCT Blood Glucose.: 152 mg/dL (21 May 2019 14:58)    POCT Blood Glucose.: 152 mg/dL (05-21-19 @ 14:58)  POCT Blood Glucose.: 73 mg/dL (05-21-19 @ 12:53)  POCT Blood Glucose.: 78 mg/dL (05-21-19 @ 12:51)  POCT Blood Glucose.: 296 mg/dL (05-21-19 @ 08:53)  POCT Blood Glucose.: 329 mg/dL (05-20-19 @ 23:57)  POCT Blood Glucose.: 344 mg/dL (05-20-19 @ 22:23)  POCT Blood Glucose.: 211 mg/dL (05-20-19 @ 18:31)  POCT Blood Glucose.: 200 mg/dL (05-20-19 @ 16:11)  POCT Blood Glucose.: 409 mg/dL (05-20-19 @ 12:50)  POCT Blood Glucose.: 359 mg/dL (05-20-19 @ 08:51)  POCT Blood Glucose.: 245 mg/dL (05-19-19 @ 22:14)      05-22    138  |  103  |  31<H>  ----------------------------<  225<H>  4.6   |  24  |  0.89    Ca    8.5      22 May 2019 06:30  Phos  2.6     05-21  Mg     2.0     05-21        Hemoglobin A1C, Whole Blood: 10.7 % <H> [4.0 - 5.6] (05-17-19 @ 06:30)

## 2019-05-22 NOTE — PROGRESS NOTE ADULT - PROBLEM SELECTOR PROBLEM 3
Non-small cell lung cancer

## 2019-05-22 NOTE — DISCHARGE NOTE NURSING/CASE MANAGEMENT/SOCIAL WORK - NSDCPEEMAIL_GEN_ALL_CORE
[facility] for Tobacco Control email [email or web address]

## 2019-05-22 NOTE — DISCHARGE NOTE NURSING/CASE MANAGEMENT/SOCIAL WORK - NSPROEXTENSIONSOFSELF_GEN_A_NUR
"Kita Smith is a 55 year old female who presents for:  Chief Complaint   Patient presents with     Oncology Clinic Visit     Breast Cancer 6 week F/U        Initial Vitals:  /73 (BP Location: Right arm)  Pulse 72  Temp 97.2  F (36.2  C) (Oral)  Ht 1.676 m (5' 6\")  Wt 58.8 kg (129 lb 9.6 oz)  LMP 12/01/2006  BMI 20.92 kg/m2 Estimated body mass index is 20.92 kg/(m^2) as calculated from the following:    Height as of this encounter: 1.676 m (5' 6\").    Weight as of this encounter: 58.8 kg (129 lb 9.6 oz).. Body surface area is 1.65 meters squared. BP completed using cuff size: regular  Severe Pain (7) Patient's last menstrual period was 12/01/2006. Allergies and medications reviewed.     Medications: Medication refills not needed today.  Pharmacy name entered into [employer]:    [facility] [address] [place] [address] [id number] & [address]    Comments: Labs drawn by PHILIPPE Blanco 21 gauge BF without difficulty  10  minutes for nursing intake (face to face time)   Kary Schultz RN    DISCHARGE PLAN:  1.) Patient to be scheduled for Pet/CT scan in 3 months. Prep for Pet/CT scan instructions reviewed with patient, including diet/exercise  restrictions.   2.) Patient to scheduled for labs and follow up with Dr. Jasso in 3 months.   Next appointments: See patient instruction section  Departure Mode: Ambulatory  Accompanied by: self  10 minutes for nursing discharge (face to face time)   Kary Schultz RN        " eyeglasses eyeglasses/cane

## 2019-05-22 NOTE — PROGRESS NOTE ADULT - PROBLEM SELECTOR PLAN 1
DDx includes paraneoplastic syndrome versus worsened brain metastases. T/L MRI negative for spinal cord compression, brain MRI increased brain mets  - PT rec rehab  - on dexa 4q8
T/L MRI negative for spinal cord compression, brain MRI increased brain mets  - PT rec rehab  - on dexa 4q8
T/L MRI negative for spinal cord compression, brain MRI increased brain mets size  - PT rec rehab- family wants home  - on dexa 4q8 to be tapered down to 2mg bid
T/L MRI negative for spinal cord compression, brain MRI increased brain mets size  - PT rec rehab- family wants home  - on dexa 4q8 to be tapered down to 2mg bid
T/L MRI negative for spinal cord compression, brain MRI increased brain mets  - PT rec rehab  - on dexa 4q8
T/L MRI negative for spinal cord compression, brain MRI increased brain mets size  - PT rec rehab  - on dexa 4q8

## 2019-05-23 PROBLEM — E78.5 HYPERLIPIDEMIA, UNSPECIFIED: Chronic | Status: ACTIVE | Noted: 2019-05-16

## 2019-05-23 PROBLEM — C34.90 MALIGNANT NEOPLASM OF UNSPECIFIED PART OF UNSPECIFIED BRONCHUS OR LUNG: Chronic | Status: ACTIVE | Noted: 2019-05-16

## 2019-05-23 PROBLEM — I10 ESSENTIAL (PRIMARY) HYPERTENSION: Chronic | Status: ACTIVE | Noted: 2019-05-16

## 2019-05-23 PROBLEM — M06.9 RHEUMATOID ARTHRITIS, UNSPECIFIED: Chronic | Status: ACTIVE | Noted: 2019-05-16

## 2019-05-23 PROBLEM — E11.9 TYPE 2 DIABETES MELLITUS WITHOUT COMPLICATIONS: Chronic | Status: ACTIVE | Noted: 2019-05-16

## 2019-05-24 ENCOUNTER — OUTPATIENT (OUTPATIENT)
Dept: OUTPATIENT SERVICES | Facility: HOSPITAL | Age: 74
LOS: 1 days | Discharge: HOME | End: 2019-05-24

## 2019-05-24 DIAGNOSIS — C34.90 MALIGNANT NEOPLASM OF UNSPECIFIED PART OF UNSPECIFIED BRONCHUS OR LUNG: ICD-10-CM

## 2019-05-24 DIAGNOSIS — Z90.49 ACQUIRED ABSENCE OF OTHER SPECIFIED PARTS OF DIGESTIVE TRACT: Chronic | ICD-10-CM

## 2019-05-29 ENCOUNTER — APPOINTMENT (OUTPATIENT)
Dept: HEMATOLOGY ONCOLOGY | Facility: CLINIC | Age: 74
End: 2019-05-29
Payer: MEDICAID

## 2019-05-29 VITALS
RESPIRATION RATE: 14 BRPM | DIASTOLIC BLOOD PRESSURE: 77 MMHG | HEART RATE: 76 BPM | SYSTOLIC BLOOD PRESSURE: 127 MMHG | OXYGEN SATURATION: 95 % | TEMPERATURE: 97.9 F

## 2019-05-29 DIAGNOSIS — C79.31 SECONDARY MALIGNANT NEOPLASM OF BRAIN: ICD-10-CM

## 2019-05-29 PROCEDURE — 99214 OFFICE O/P EST MOD 30 MIN: CPT

## 2019-06-04 PROBLEM — C79.31 BRAIN METASTASES: Status: ACTIVE | Noted: 2019-04-17

## 2019-06-04 NOTE — REVIEW OF SYSTEMS
[Fatigue] : fatigue [Recent Change In Weight] : ~T no recent weight change [Difficulty Walking] : difficulty walking [Negative] : Allergic/Immunologic

## 2019-06-04 NOTE — ASSESSMENT
[FreeTextEntry1] : 73m with RA and metastatic KRAS mutated lung adenocarcinoma, PDL1 TPS 5% with mets to brain, s/p 4/5 sessions of whole brain RT, course /b increased weakness in the setting of POD vs inflammation from RT, presenting to discuss systemic treatment options. \par \par I discussed the treatment options with the patient and his daughter. HCP is son in law and he is not present during this visit. I explained that given the rapid progression of malignancy in brain and the declining performance status, treatment might not be effective. I recommended hospice, however patient can consider systemic treatment is he is very eager to try with the understanding of risks and benefits. Option is carbo/pemetrexed, as PDL1 TPS is 5% and moreover patient has rheumatoid arthritis and starting immunotherapy may result in a flare. \par \par His daughter will discuss treatment options with her father,  and siblings and will call office with final decision about the next steps. \par \par -Palliative care referral\par -Rad onc f/u\par -Can consider dose reduced carbo/pem if desired\par

## 2019-06-04 NOTE — HISTORY OF PRESENT ILLNESS
[de-identified] : Mr Rebolledo is a 73m with significant smoking history who presents for evaluation of advanced NSCLC. \par He presented to [facility] on 3/18/19 after having LUE and LLE weakness, A CT head showed multifocal regions of vasogenic/perilesional edema, concerning for metastatic disease. MRI done 3/18/19 showed several enhancing lesions consistent with diffuse cerebral metastatic lesions.\par A CT chest on 3/19/19 revealed 1 3.9 cm right upper lobe mass, in addition to right mediastinal lymphadenopathy.\par Right neck mass biopsy on 3/25/19 at [facility] revealed metastatic pulmonary carcinoma, micropapillary type. PDL1 5%, KRAS mutated.\par He was evaluated by radiation oncology Dr Estrada and By neurosurgery Dr Ferreira and was initially planned for Gamma Knife, but had significant progression of disease and plan was changed to WBRT. \par \par He was seen in my office after 4/5 sessions of RT on 5/15, he was weak and unable to ambulate, he was referred to the ED and was admitted, MRI brain during admission with progression of disease vs post RT inflammation. MRI spine was without signs of disease. He was recommended to be discharged to rehab, however he refused. He is presenting for follow up. He remains weak and is not able to ambulate without help. His goal is to get well enough to go back to [facility] to see his family, however his daughter understands that this may not be possible.\par \par He lives with his daughter and has good social support.

## 2019-06-18 ENCOUNTER — OUTPATIENT (OUTPATIENT)
Dept: OUTPATIENT SERVICES | Facility: HOSPITAL | Age: 74
LOS: 1 days | Discharge: HOME | End: 2019-06-18

## 2019-06-18 DIAGNOSIS — Z90.49 ACQUIRED ABSENCE OF OTHER SPECIFIED PARTS OF DIGESTIVE TRACT: Chronic | ICD-10-CM

## 2019-06-18 DIAGNOSIS — C34.90 MALIGNANT NEOPLASM OF UNSPECIFIED PART OF UNSPECIFIED BRONCHUS OR LUNG: ICD-10-CM

## 2019-06-23 ENCOUNTER — FORM ENCOUNTER (OUTPATIENT)
Age: 74
End: 2019-06-23

## 2019-06-24 ENCOUNTER — APPOINTMENT (OUTPATIENT)
Dept: ULTRASOUND IMAGING | Facility: IMAGING CENTER | Age: 74
End: 2019-06-24
Payer: MEDICAID

## 2019-06-24 ENCOUNTER — OUTPATIENT (OUTPATIENT)
Dept: OUTPATIENT SERVICES | Facility: HOSPITAL | Age: 74
LOS: 1 days | End: 2019-06-24

## 2019-06-24 ENCOUNTER — APPOINTMENT (OUTPATIENT)
Dept: RHEUMATOLOGY | Facility: CLINIC | Age: 74
End: 2019-06-24
Payer: MEDICAID

## 2019-06-24 ENCOUNTER — APPOINTMENT (OUTPATIENT)
Dept: HEMATOLOGY ONCOLOGY | Facility: CLINIC | Age: 74
End: 2019-06-24
Payer: MEDICAID

## 2019-06-24 ENCOUNTER — OUTPATIENT (OUTPATIENT)
Dept: OUTPATIENT SERVICES | Facility: HOSPITAL | Age: 74
LOS: 1 days | End: 2019-06-24
Payer: MEDICAID

## 2019-06-24 VITALS
SYSTOLIC BLOOD PRESSURE: 125 MMHG | TEMPERATURE: 97.8 F | OXYGEN SATURATION: 98 % | HEART RATE: 67 BPM | DIASTOLIC BLOOD PRESSURE: 78 MMHG | RESPIRATION RATE: 14 BRPM

## 2019-06-24 VITALS
WEIGHT: 160 LBS | HEIGHT: 63 IN | HEART RATE: 77 BPM | BODY MASS INDEX: 28.35 KG/M2 | DIASTOLIC BLOOD PRESSURE: 68 MMHG | SYSTOLIC BLOOD PRESSURE: 108 MMHG

## 2019-06-24 DIAGNOSIS — Z87.39 PERSONAL HISTORY OF OTHER DISEASES OF THE MUSCULOSKELETAL SYSTEM AND CONNECTIVE TISSUE: ICD-10-CM

## 2019-06-24 DIAGNOSIS — C79.31 MALIGNANT NEOPLASM OF UNSPECIFIED PART OF UNSPECIFIED BRONCHUS OR LUNG: ICD-10-CM

## 2019-06-24 DIAGNOSIS — Z90.49 ACQUIRED ABSENCE OF OTHER SPECIFIED PARTS OF DIGESTIVE TRACT: Chronic | ICD-10-CM

## 2019-06-24 DIAGNOSIS — M79.669 PAIN IN UNSPECIFIED LOWER LEG: ICD-10-CM

## 2019-06-24 DIAGNOSIS — C34.90 MALIGNANT NEOPLASM OF UNSPECIFIED PART OF UNSPECIFIED BRONCHUS OR LUNG: ICD-10-CM

## 2019-06-24 DIAGNOSIS — I01.1 ACUTE RHEUMATIC ENDOCARDITIS: ICD-10-CM

## 2019-06-24 DIAGNOSIS — G62.9 POLYNEUROPATHY, UNSPECIFIED: ICD-10-CM

## 2019-06-24 DIAGNOSIS — Z51.5 ENCOUNTER FOR PALLIATIVE CARE: ICD-10-CM

## 2019-06-24 DIAGNOSIS — I82.492 ACUTE EMBOLISM AND THROMBOSIS OF OTHER SPECIFIED DEEP VEIN OF LEFT LOWER EXTREMITY: ICD-10-CM

## 2019-06-24 DIAGNOSIS — Z71.89 OTHER SPECIFIED COUNSELING: ICD-10-CM

## 2019-06-24 PROCEDURE — 93971 EXTREMITY STUDY: CPT

## 2019-06-24 PROCEDURE — 99214 OFFICE O/P EST MOD 30 MIN: CPT | Mod: GC

## 2019-06-24 PROCEDURE — 93971 EXTREMITY STUDY: CPT | Mod: 26,LT

## 2019-06-24 PROCEDURE — 99215 OFFICE O/P EST HI 40 MIN: CPT

## 2019-06-24 RX ORDER — DEXAMETHASONE 1 MG/1
1 TABLET ORAL
Qty: 15 | Refills: 0 | Status: ACTIVE | COMMUNITY
Start: 2019-04-12 | End: 1900-01-01

## 2019-06-25 ENCOUNTER — CHART COPY (OUTPATIENT)
Age: 74
End: 2019-06-25

## 2019-06-25 PROBLEM — Z51.5 ENCOUNTER FOR PALLIATIVE CARE: Status: ACTIVE | Noted: 2019-06-25

## 2019-06-25 PROBLEM — Z71.89 ADVANCE CARE PLANNING: Status: ACTIVE | Noted: 2019-06-25

## 2019-06-25 PROBLEM — I82.492 DEEP VEIN THROMBOSIS (DVT) OF OTHER VEIN OF LEFT LOWER EXTREMITY: Status: ACTIVE | Noted: 2019-06-25

## 2019-06-25 PROBLEM — G62.9 NEUROPATHY: Status: ACTIVE | Noted: 2019-06-25

## 2019-06-25 RX ORDER — ENOXAPARIN SODIUM 100 MG/ML
100 INJECTION SUBCUTANEOUS DAILY
Qty: 3 | Refills: 0 | Status: ACTIVE | COMMUNITY
Start: 2019-06-25 | End: 1900-01-01

## 2019-06-25 NOTE — ASSESSMENT
[FreeTextEntry1] : 74 y/o M w hx of NSCLC, RA here for to estabish care. \par \par RA\par -despite pain, no evidence of synovitis on physical exam\par -pt LLE swelling confirmed to be DVT- however, a/c might be contraindication given brain mets\par -currently on dexamethasone 1mg BID; will touch base w Dr. Waterman regarding steroids dosage; as pt RA controlled on steroids\par -will c/w low dose steroids, as options are limited in setting of metastatic lung ca, and pt now palliative care\par \par NSCLC\par -pt currently declining chemo, immunotherapy\par -pt now palliative care, to f/u today \par \par LLE DVT\par -confirmed today by US\par -advised to go to ED, as pt might have contraindication to a/c given brain mets\par -pt and daughter decline and will follow up w palliative instead\par \par RTC in 8-10 weeks or sooner if flares\par \par d/w Dr. Velasco\par \par

## 2019-06-25 NOTE — REVIEW OF SYSTEMS
[Feeling Tired] : feeling tired [Cough] : cough [As Noted in HPI] : as noted in HPI [Negative] : Heme/Lymph [FreeTextEntry6] : slight yellowish spit.  [de-identified] : weakness 2/2 to brrain mets

## 2019-06-25 NOTE — HISTORY OF PRESENT ILLNESS
[FreeTextEntry1] : 74 y/o M w hx of NSCLC, RA here for to \[facility]\"" care. \par \par Pt currently has pain in L elbow, L knee. This has been present for several months now. L ankle, foot swelling. Has been present for several months now. No stiffness. Pt after hospitalization wheelchair. Pt only did 3 PT sessions. \par \par MTX was stopped in May, 2019. Pt currenlty on dexamethason 1mg BID for brain mets. \par \par 10 WBRT. \par \par RA hx:\par 2009 dx in [facility] w pain, swelling of fingers, wrist, knees, ankles, feet. Pt was put on MTX, plaquenil, and initially steroids w good response. He had good control of his symptoms. 3 years ago started following w Dr. Storey. Sees this rheumatologist once a year. Gets refills through primary. Pt says that his symptoms have been uncontrolled for years with this regimen. Pt has not required steroids for his RA for years. No extraarticular \par \par \par Lung ca hx: \par dx in 3/19, after pt w L sided weakness. CT head showed multiple enhacing lesions w vasogenic edema. CT of chest showed RUL mass, mediastinal adenopathy. Pt had R neck mass bx showing pulmonary carcinoma. Received WBRT X 9. Pt hospitalized on 5/19 for increase weakness. Found to have progression of brain mets. Ot started on dexa 4 q8h, and tapered to 2mg BID at discharged.  Rheum consulted for possibility of starting patient on PD1 inhibitor.

## 2019-06-25 NOTE — PHYSICAL EXAM
[General Appearance - In No Acute Distress] : in no acute distress [Oropharynx] : the oropharynx was normal [Examination Of The Oral Cavity] : the lips and gums were normal [Sclera] : the sclera and conjunctiva were normal [Auscultation Breath Sounds / Voice Sounds] : lungs were clear to auscultation bilaterally [Heart Sounds] : normal S1 and S2 [Heart Sounds Gallop] : no gallops [Murmurs] : no murmurs [Heart Sounds Pericardial Friction Rub] : no pericardial rub [Abdomen Tenderness] : non-tender [Abdomen Soft] : soft [] : no hepato-splenomegaly [Skin Color & Pigmentation] : normal skin color and pigmentation [FreeTextEntry1] : L ankle swelling. Tenderness throughout all joints and also muscles. Heberden's and Oksaan's nodes.

## 2019-06-27 NOTE — PHYSICAL EXAM
[General Appearance - Alert] : alert [Normal Oral Mucosa] : normal oral mucosa [Sclera] : the sclera and conjunctiva were normal [Neck Appearance] : the appearance of the neck was normal [Bowel Sounds] : normal bowel sounds [No Focal Deficits] : no focal deficits [FreeTextEntry1] : Generalized skin changes 2/2 sun?

## 2019-06-27 NOTE — ASSESSMENT
[FreeTextEntry1] : 73yoM with:\par \par 1.  Metastatic lung ca- Patient is s/p WBRT. C/w Dexamethasone 1mg once daily for 7 days. Decrease Dexamethasone 0.5mg daily after 7 days. No disease modifying therapy being pursued given patient's debility. \par \par 2.  Neuropathy- C/w gabapentin.\par \par 3.  DVT - Discussed u/s finding with Emili Estrada and Attarian - benefits outweigh risks in terms of anticoagulation in the setting of brain metastases that were treated. Will initiate Lovenox 1.5mg/kg once daily. \par \par 4. Physical debility - KPS ~50%, requires assistance with ADLs. Home Care referral made. \par \par 5.  Encounter for palliative care/Advance Care Planning - Discussed in detail the benefit of incorporating home hospice services at this juncture.  Daughter and HCP are not agreeable to initiating services at this time.  Made aware that services can be initiated by calling the office. Discussed MOLST, provided form to family to review together at home.  Daughter and HCP want patient to remain a full code but will discuss with patient's spouse. \par \par RTO 2 weeks

## 2019-06-27 NOTE — HISTORY OF PRESENT ILLNESS
[FreeTextEntry1] : 73yoM with metastatic NSCLC presents for initial palliative care visit, referred by Dr. Waterman.\par PMH also significant for RA, DM, HTN, hyperlipidemia, former smoker.  \par \par Patient presented to [facility] on 3/18/19 with LUE and LLE weakness.   A CT head showed multifocal regions of vasogenic/ perilesional edema, concerning for metastatic disease.  MRI done 3/18/19 showed several enhancing lesions consistent with diffuse cerebral metastatic lesions.\par A CT chest on 3/19/19 revealed 1 3.9 cm right upper lobe mass, in addition to right mediastinal lymphadenopathy.  Right neck biopsy revealed metastatic pulmonary carcinoma.  \par \par Due to progression of malignancy and declining performance status, Dr. Waterman previously recommended hospice.  Patient and family present in office to discuss care planning.\par \par Patient's main complaint is chronic diabetic neuropathy in LLE and LUE, is on gabapentin. \par \par ROS\par +fatigue\par +SOB\par Denies nausea/vomiting, constipation, diarrhea\par \par Patient is , lives with his spouse, daughter, and son-in-law.\par \par I-STOP Ref#: [id number]\par \par *Visit was abbreviated as patient arrived 32 minutes late for appointment.

## 2019-06-27 NOTE — PHYSICAL EXAM
[General Appearance - Alert] : alert [Sclera] : the sclera and conjunctiva were normal [Normal Oral Mucosa] : normal oral mucosa [Neck Appearance] : the appearance of the neck was normal [Bowel Sounds] : normal bowel sounds [No Focal Deficits] : no focal deficits [FreeTextEntry1] : Generalized skin changes 2/2 sun?

## 2019-06-27 NOTE — HISTORY OF PRESENT ILLNESS
[FreeTextEntry1] : 73yoM with metastatic NSCLC presents for initial palliative care visit, referred by Dr. Waterman.\par PMH also significant for RA, DM, HTN, hyperlipidemia, former smoker.  \par \par Patient presented to [facility] on 3/18/19 with LUE and LLE weakness.   A CT head showed multifocal regions of vasogenic/ perilesional edema, concerning for metastatic disease.  MRI done 3/18/19 showed several enhancing lesions consistent with diffuse cerebral metastatic lesions.\par A CT chest on 3/19/19 revealed 1 3.9 cm right upper lobe mass, in addition to right mediastinal lymphadenopathy.  Right neck biopsy revealed metastatic pulmonary carcinoma.  \par \par Due to progression of malignancy and declining performance status, Dr. Waterman previously recommended hospice.  Patient and family present in office to discuss care planning.\par \par Patient's main complaint is chronic diabetic neuropathy in LLE and LUE, is on gabapentin. \par \par ROS\par +fatigue\par +SOB\par Denies nausea/vomiting, constipation, diarrhea\par \par Patient is , lives with his spouse, daughter, and son-in-law.\par \par I-STOP Ref#: [id number]\par \par *Visit was abbreviated as patient arrived 32 minutes late for appointment.

## 2019-07-15 ENCOUNTER — APPOINTMENT (OUTPATIENT)
Dept: HEMATOLOGY ONCOLOGY | Facility: CLINIC | Age: 74
End: 2019-07-15

## 2019-07-15 NOTE — HISTORY OF PRESENT ILLNESS
[FreeTextEntry1] : 73yoM with metastatic NSCLC presents for initial palliative care visit, referred by Dr. Waterman.\par PMH also significant for RA, DM, HTN, hyperlipidemia, former smoker.  \par \par Patient presented to [facility] on 3/18/19 with LUE and LLE weakness.   A CT head showed multifocal regions of vasogenic/ perilesional edema, concerning for metastatic disease.  MRI done 3/18/19 showed several enhancing lesions consistent with diffuse cerebral metastatic lesions.\par A CT chest on 3/19/19 revealed 1 3.9 cm right upper lobe mass, in addition to right mediastinal lymphadenopathy.  Right neck biopsy revealed metastatic pulmonary carcinoma.  \par \par Due to progression of malignancy and declining performance status, Dr. Waterman previously recommended hospice.  Patient and family present in office to discuss care planning.\par \par Patient's main complaint is chronic diabetic neuropathy in LLE and LUE, is on gabapentin. \par \par ROS\par +fatigue\par +SOB\par Denies nausea/vomiting, constipation, diarrhea\par \par Patient is , lives with his spouse, daughter, and son-in-law.\par \par I-STOP Ref#: [id number]\par \par *Visit was abbreviated as patient arrived 32 minutes late for appointment.

## 2019-07-17 ENCOUNTER — APPOINTMENT (OUTPATIENT)
Dept: RADIATION ONCOLOGY | Facility: CLINIC | Age: 74
End: 2019-07-17

## 2019-08-02 ENCOUNTER — APPOINTMENT (OUTPATIENT)
Dept: MRI IMAGING | Facility: IMAGING CENTER | Age: 74
End: 2019-08-02

## 2019-08-05 ENCOUNTER — APPOINTMENT (OUTPATIENT)
Dept: RHEUMATOLOGY | Facility: CLINIC | Age: 74
End: 2019-08-05

## 2019-12-25 PROBLEM — C34.90 PRIMARY MALIGNANT NEOPLASM OF LUNG WITH METASTASIS TO BRAIN: Status: ACTIVE | Noted: 2019-04-12

## 2021-09-18 NOTE — H&P ADULT - PROBLEM SELECTOR PLAN 4
c/w sliding scale insulin. Check FS qid PLT ~ 65. Re-check PLT. Hold DVT ppx if PLT falls to < 50K. Follow up heme/onc recs Attending Attestation (For Attendings USE Only)...

## 2022-02-11 NOTE — ED ADULT NURSE REASSESSMENT NOTE - NS ED NURSE REASSESS COMMENT FT1
Pt. voided using urinal. Skin is dry and intact. Urine culture sent. Medication infusing as per order. No acute distress at present. Respirations are even and unlabored on room air. Pt. is admitted to Medicine, awaiting bed assignment. Will continue to monitor. 11-Feb-2022 15:43